# Patient Record
Sex: FEMALE | Race: WHITE | Employment: UNEMPLOYED | URBAN - METROPOLITAN AREA
[De-identification: names, ages, dates, MRNs, and addresses within clinical notes are randomized per-mention and may not be internally consistent; named-entity substitution may affect disease eponyms.]

---

## 2017-01-05 ENCOUNTER — OFFICE VISIT (OUTPATIENT)
Dept: INTERNAL MEDICINE CLINIC | Age: 27
End: 2017-01-05

## 2017-01-05 VITALS — OXYGEN SATURATION: 99 % | SYSTOLIC BLOOD PRESSURE: 128 MMHG | DIASTOLIC BLOOD PRESSURE: 78 MMHG | HEART RATE: 92 BPM

## 2017-01-05 DIAGNOSIS — R10.31 RIGHT LOWER QUADRANT ABDOMINAL PAIN: Primary | ICD-10-CM

## 2017-01-05 DIAGNOSIS — R00.2 PALPITATIONS: ICD-10-CM

## 2017-01-05 DIAGNOSIS — N92.6 MISSED MENSES: ICD-10-CM

## 2017-01-05 DIAGNOSIS — E55.9 VITAMIN D DEFICIENCY: ICD-10-CM

## 2017-01-05 DIAGNOSIS — E07.9 THYROID DISEASE: ICD-10-CM

## 2017-01-05 LAB — GONADOTROPIN, CHORIONIC (HCG) QUANT: <5 MIU/ML

## 2017-01-05 PROCEDURE — 99213 OFFICE O/P EST LOW 20 MIN: CPT | Performed by: NURSE PRACTITIONER

## 2017-01-05 RX ORDER — LEVOTHYROXINE SODIUM 0.12 MG/1
125 TABLET ORAL DAILY
Qty: 30 TABLET | Refills: 2 | Status: SHIPPED | OUTPATIENT
Start: 2017-01-05 | End: 2017-04-14 | Stop reason: SDUPTHER

## 2017-01-06 ENCOUNTER — TELEPHONE (OUTPATIENT)
Dept: INTERNAL MEDICINE CLINIC | Age: 27
End: 2017-01-06

## 2017-01-07 LAB — URINE CULTURE, ROUTINE: NORMAL

## 2017-01-10 ENCOUNTER — HOSPITAL ENCOUNTER (OUTPATIENT)
Dept: ULTRASOUND IMAGING | Age: 27
Discharge: HOME | End: 2017-01-10
Attending: NURSE PRACTITIONER | Admitting: NURSE PRACTITIONER

## 2017-01-10 DIAGNOSIS — R10.31 RIGHT LOWER QUADRANT PAIN: ICD-10-CM

## 2017-01-10 DIAGNOSIS — R10.31 RLQ ABDOMINAL PAIN: ICD-10-CM

## 2017-01-19 ENCOUNTER — OFFICE VISIT (OUTPATIENT)
Dept: INTERNAL MEDICINE CLINIC | Age: 27
End: 2017-01-19

## 2017-01-19 VITALS
SYSTOLIC BLOOD PRESSURE: 118 MMHG | OXYGEN SATURATION: 98 % | DIASTOLIC BLOOD PRESSURE: 76 MMHG | TEMPERATURE: 98.6 F | HEART RATE: 73 BPM

## 2017-01-19 DIAGNOSIS — J02.9 PHARYNGITIS, UNSPECIFIED ETIOLOGY: ICD-10-CM

## 2017-01-19 DIAGNOSIS — J00 ACUTE NASOPHARYNGITIS: Primary | ICD-10-CM

## 2017-01-19 LAB — S PYO AG THROAT QL: NORMAL

## 2017-01-19 PROCEDURE — 87880 STREP A ASSAY W/OPTIC: CPT | Performed by: NURSE PRACTITIONER

## 2017-01-19 PROCEDURE — 99213 OFFICE O/P EST LOW 20 MIN: CPT | Performed by: NURSE PRACTITIONER

## 2017-01-27 ENCOUNTER — OFFICE VISIT (OUTPATIENT)
Dept: PHYSICAL MEDICINE AND REHAB | Age: 27
End: 2017-01-27

## 2017-01-27 DIAGNOSIS — M79.602 PARESTHESIA AND PAIN OF BOTH UPPER EXTREMITIES: ICD-10-CM

## 2017-01-27 DIAGNOSIS — M79.601 PARESTHESIA AND PAIN OF BOTH UPPER EXTREMITIES: ICD-10-CM

## 2017-01-27 DIAGNOSIS — R20.2 PARESTHESIA AND PAIN OF BOTH UPPER EXTREMITIES: ICD-10-CM

## 2017-01-27 DIAGNOSIS — G56.03 BILATERAL CARPAL TUNNEL SYNDROME: Primary | ICD-10-CM

## 2017-01-27 DIAGNOSIS — R29.898 HAND WEAKNESS: ICD-10-CM

## 2017-01-27 PROCEDURE — 95911 NRV CNDJ TEST 9-10 STUDIES: CPT | Performed by: PHYSICAL MEDICINE & REHABILITATION

## 2017-01-27 PROCEDURE — 95886 MUSC TEST DONE W/N TEST COMP: CPT | Performed by: PHYSICAL MEDICINE & REHABILITATION

## 2017-02-01 ENCOUNTER — TELEPHONE (OUTPATIENT)
Dept: INTERNAL MEDICINE CLINIC | Age: 27
End: 2017-02-01

## 2017-03-13 ENCOUNTER — OFFICE VISIT (OUTPATIENT)
Dept: INTERNAL MEDICINE CLINIC | Age: 27
End: 2017-03-13

## 2017-03-13 VITALS — DIASTOLIC BLOOD PRESSURE: 76 MMHG | SYSTOLIC BLOOD PRESSURE: 108 MMHG | RESPIRATION RATE: 14 BRPM | HEART RATE: 72 BPM

## 2017-03-13 DIAGNOSIS — R30.0 DYSURIA: Primary | ICD-10-CM

## 2017-03-13 LAB
BILIRUBIN URINE: NEGATIVE
BILIRUBIN, POC: NORMAL
BLOOD URINE, POC: NORMAL
BLOOD, URINE: NEGATIVE
CLARITY, POC: NORMAL
CLARITY: ABNORMAL
COLOR, POC: YELLOW
COLOR: YELLOW
EPITHELIAL CELLS, UA: 7 /HPF (ref 0–5)
GLUCOSE URINE, POC: NORMAL
GLUCOSE URINE: NEGATIVE MG/DL
HYALINE CASTS: 2 /HPF (ref 0–8)
KETONES, POC: NORMAL
KETONES, URINE: NEGATIVE MG/DL
LEUKOCYTE EST, POC: NORMAL
LEUKOCYTE ESTERASE, URINE: NEGATIVE
MICROSCOPIC EXAMINATION: YES
NITRITE, POC: NORMAL
NITRITE, URINE: NEGATIVE
PH UA: 6
PH, POC: 6
PROTEIN UA: NEGATIVE MG/DL
PROTEIN, POC: NORMAL
RBC UA: 8 /HPF (ref 0–4)
SPECIFIC GRAVITY UA: 1.01
SPECIFIC GRAVITY, POC: 1.02
URINE TYPE: ABNORMAL
UROBILINOGEN, POC: 0.2
UROBILINOGEN, URINE: 0.2 E.U./DL
WBC UA: 1 /HPF (ref 0–5)

## 2017-03-13 PROCEDURE — 99213 OFFICE O/P EST LOW 20 MIN: CPT | Performed by: INTERNAL MEDICINE

## 2017-03-13 PROCEDURE — 81002 URINALYSIS NONAUTO W/O SCOPE: CPT | Performed by: INTERNAL MEDICINE

## 2017-03-15 LAB
ORGANISM: ABNORMAL
URINE CULTURE, ROUTINE: ABNORMAL

## 2017-03-15 RX ORDER — CIPROFLOXACIN 500 MG/1
500 TABLET, FILM COATED ORAL 2 TIMES DAILY
Qty: 14 TABLET | Refills: 0 | Status: SHIPPED | OUTPATIENT
Start: 2017-03-15 | End: 2017-03-22

## 2017-04-14 DIAGNOSIS — E07.9 THYROID DISEASE: ICD-10-CM

## 2017-04-17 RX ORDER — LEVOTHYROXINE SODIUM 0.12 MG/1
TABLET ORAL
Qty: 30 TABLET | Refills: 0 | Status: SHIPPED | OUTPATIENT
Start: 2017-04-17 | End: 2017-04-24 | Stop reason: SDUPTHER

## 2017-04-24 ENCOUNTER — OFFICE VISIT (OUTPATIENT)
Dept: INTERNAL MEDICINE CLINIC | Age: 27
End: 2017-04-24

## 2017-04-24 VITALS
OXYGEN SATURATION: 98 % | SYSTOLIC BLOOD PRESSURE: 102 MMHG | DIASTOLIC BLOOD PRESSURE: 72 MMHG | HEART RATE: 60 BPM | BODY MASS INDEX: 37.6 KG/M2 | WEIGHT: 205.6 LBS

## 2017-04-24 DIAGNOSIS — E07.9 THYROID DISEASE: Primary | ICD-10-CM

## 2017-04-24 DIAGNOSIS — J02.9 PHARYNGITIS, UNSPECIFIED ETIOLOGY: ICD-10-CM

## 2017-04-24 DIAGNOSIS — E07.9 THYROID DISEASE: ICD-10-CM

## 2017-04-24 LAB
S PYO AG THROAT QL: NORMAL
TSH SERPL DL<=0.05 MIU/L-ACNC: 2.11 UIU/ML (ref 0.27–4.2)

## 2017-04-24 PROCEDURE — 99213 OFFICE O/P EST LOW 20 MIN: CPT | Performed by: NURSE PRACTITIONER

## 2017-04-24 PROCEDURE — 87880 STREP A ASSAY W/OPTIC: CPT | Performed by: NURSE PRACTITIONER

## 2017-04-24 RX ORDER — MULTIVIT-MIN/IRON/FOLIC ACID/K 18-600-40
1 CAPSULE ORAL DAILY
COMMUNITY
End: 2017-07-26 | Stop reason: SDUPTHER

## 2017-04-24 RX ORDER — LEVOTHYROXINE SODIUM 0.12 MG/1
TABLET ORAL
Qty: 30 TABLET | Refills: 0 | Status: SHIPPED | OUTPATIENT
Start: 2017-04-24 | End: 2017-04-24 | Stop reason: CLARIF

## 2017-04-24 RX ORDER — LEVOTHYROXINE SODIUM 0.12 MG/1
TABLET ORAL
Qty: 30 TABLET | Refills: 5 | Status: SHIPPED | OUTPATIENT
Start: 2017-04-24 | End: 2017-10-16 | Stop reason: SDUPTHER

## 2017-04-28 DIAGNOSIS — K21.9 GASTROESOPHAGEAL REFLUX DISEASE WITHOUT ESOPHAGITIS: ICD-10-CM

## 2017-04-28 RX ORDER — RANITIDINE 300 MG/1
300 TABLET ORAL NIGHTLY
Qty: 30 TABLET | Refills: 3 | Status: SHIPPED | OUTPATIENT
Start: 2017-04-28 | End: 2017-09-20 | Stop reason: SDUPTHER

## 2017-05-08 ENCOUNTER — OFFICE VISIT (OUTPATIENT)
Dept: INTERNAL MEDICINE CLINIC | Age: 27
End: 2017-05-08

## 2017-05-08 VITALS
SYSTOLIC BLOOD PRESSURE: 120 MMHG | DIASTOLIC BLOOD PRESSURE: 78 MMHG | RESPIRATION RATE: 16 BRPM | OXYGEN SATURATION: 97 % | HEIGHT: 63 IN | HEART RATE: 55 BPM | BODY MASS INDEX: 36.86 KG/M2 | WEIGHT: 208 LBS

## 2017-05-08 DIAGNOSIS — R30.0 BURNING WITH URINATION: Primary | ICD-10-CM

## 2017-05-08 LAB
BILIRUBIN, POC: NORMAL
BLOOD URINE, POC: NORMAL
CLARITY, POC: CLEAR
COLOR, POC: CLEAR
GLUCOSE URINE, POC: NORMAL
KETONES, POC: NORMAL
LEUKOCYTE EST, POC: NORMAL
NITRITE, POC: NORMAL
PH, POC: 6
PROTEIN, POC: NORMAL
REASON FOR REJECTION: NORMAL
REJECTED TEST: NORMAL
SPECIFIC GRAVITY, POC: 1.01
UROBILINOGEN, POC: NORMAL

## 2017-05-08 PROCEDURE — 81002 URINALYSIS NONAUTO W/O SCOPE: CPT | Performed by: NURSE PRACTITIONER

## 2017-05-08 PROCEDURE — 99213 OFFICE O/P EST LOW 20 MIN: CPT | Performed by: NURSE PRACTITIONER

## 2017-05-08 RX ORDER — CIPROFLOXACIN 250 MG/1
250 TABLET, FILM COATED ORAL 2 TIMES DAILY
Qty: 20 TABLET | Refills: 0 | Status: SHIPPED | OUTPATIENT
Start: 2017-05-08 | End: 2017-05-18

## 2017-05-16 ENCOUNTER — OFFICE VISIT (OUTPATIENT)
Dept: INTERNAL MEDICINE CLINIC | Age: 27
End: 2017-05-16

## 2017-05-16 VITALS — DIASTOLIC BLOOD PRESSURE: 68 MMHG | OXYGEN SATURATION: 98 % | HEART RATE: 101 BPM | SYSTOLIC BLOOD PRESSURE: 100 MMHG

## 2017-05-16 DIAGNOSIS — S39.011A ABDOMINAL MUSCLE STRAIN, INITIAL ENCOUNTER: Primary | ICD-10-CM

## 2017-05-16 PROCEDURE — 99213 OFFICE O/P EST LOW 20 MIN: CPT | Performed by: NURSE PRACTITIONER

## 2017-05-16 RX ORDER — CYCLOBENZAPRINE HCL 5 MG
5 TABLET ORAL 3 TIMES DAILY PRN
Qty: 30 TABLET | Refills: 0 | Status: SHIPPED | OUTPATIENT
Start: 2017-05-16 | End: 2017-10-16 | Stop reason: ALTCHOICE

## 2017-05-16 RX ORDER — IBUPROFEN 800 MG/1
800 TABLET ORAL EVERY 6 HOURS PRN
COMMUNITY
End: 2017-10-26 | Stop reason: ALTCHOICE

## 2017-05-16 RX ORDER — TRAMADOL HYDROCHLORIDE 50 MG/1
50 TABLET ORAL EVERY 6 HOURS PRN
Qty: 20 TABLET | Refills: 0 | Status: SHIPPED | OUTPATIENT
Start: 2017-05-16 | End: 2017-09-29 | Stop reason: ALTCHOICE

## 2017-06-27 ENCOUNTER — OFFICE VISIT (OUTPATIENT)
Dept: INTERNAL MEDICINE CLINIC | Age: 27
End: 2017-06-27

## 2017-06-27 VITALS
OXYGEN SATURATION: 98 % | SYSTOLIC BLOOD PRESSURE: 104 MMHG | HEART RATE: 64 BPM | WEIGHT: 202.8 LBS | DIASTOLIC BLOOD PRESSURE: 80 MMHG | BODY MASS INDEX: 36.21 KG/M2

## 2017-06-27 DIAGNOSIS — M79.18 INTERCOSTAL MUSCLE PAIN: Primary | ICD-10-CM

## 2017-06-27 PROCEDURE — 99213 OFFICE O/P EST LOW 20 MIN: CPT | Performed by: NURSE PRACTITIONER

## 2017-06-27 RX ORDER — PREDNISONE 10 MG/1
TABLET ORAL
Qty: 20 TABLET | Refills: 0 | Status: SHIPPED | OUTPATIENT
Start: 2017-06-27 | End: 2017-09-29 | Stop reason: ALTCHOICE

## 2017-07-03 ENCOUNTER — OFFICE VISIT (OUTPATIENT)
Dept: INTERNAL MEDICINE CLINIC | Age: 27
End: 2017-07-03

## 2017-07-03 ENCOUNTER — HOSPITAL ENCOUNTER (OUTPATIENT)
Dept: GENERAL RADIOLOGY | Age: 27
Discharge: HOME | End: 2017-07-03
Attending: NURSE PRACTITIONER | Admitting: NURSE PRACTITIONER

## 2017-07-03 VITALS
OXYGEN SATURATION: 98 % | SYSTOLIC BLOOD PRESSURE: 110 MMHG | DIASTOLIC BLOOD PRESSURE: 70 MMHG | BODY MASS INDEX: 36.6 KG/M2 | WEIGHT: 205 LBS | RESPIRATION RATE: 16 BRPM | HEART RATE: 101 BPM

## 2017-07-03 DIAGNOSIS — M79.10 MYALGIA: ICD-10-CM

## 2017-07-03 DIAGNOSIS — M79.18 INTERCOSTAL MUSCLE PAIN: Primary | ICD-10-CM

## 2017-07-03 PROCEDURE — 99213 OFFICE O/P EST LOW 20 MIN: CPT | Performed by: NURSE PRACTITIONER

## 2017-07-03 RX ORDER — CYCLOBENZAPRINE HCL 5 MG
5 TABLET ORAL 3 TIMES DAILY PRN
Qty: 30 TABLET | Refills: 0 | Status: CANCELLED | OUTPATIENT
Start: 2017-07-03

## 2017-07-06 ENCOUNTER — TELEPHONE (OUTPATIENT)
Dept: INTERNAL MEDICINE CLINIC | Age: 27
End: 2017-07-06

## 2017-07-06 DIAGNOSIS — M25.511 ACUTE PAIN OF RIGHT SHOULDER: Primary | ICD-10-CM

## 2017-07-07 DIAGNOSIS — M25.512 ACUTE PAIN OF LEFT SHOULDER: Primary | ICD-10-CM

## 2017-07-17 ENCOUNTER — OFFICE VISIT (OUTPATIENT)
Dept: INTERNAL MEDICINE CLINIC | Age: 27
End: 2017-07-17

## 2017-07-17 VITALS — DIASTOLIC BLOOD PRESSURE: 74 MMHG | OXYGEN SATURATION: 98 % | HEART RATE: 78 BPM | SYSTOLIC BLOOD PRESSURE: 112 MMHG

## 2017-07-17 DIAGNOSIS — R30.0 DYSURIA: Primary | ICD-10-CM

## 2017-07-17 DIAGNOSIS — N89.8 VAGINAL DISCHARGE: ICD-10-CM

## 2017-07-17 LAB
BILIRUBIN, POC: NORMAL
BLOOD URINE, POC: NEGATIVE
CLARITY, POC: NORMAL
COLOR, POC: NORMAL
GLUCOSE URINE, POC: NEGATIVE
KETONES, POC: NEGATIVE
LEUKOCYTE EST, POC: NEGATIVE
NITRITE, POC: NEGATIVE
PH, POC: 6
PROTEIN, POC: NEGATIVE
SPECIFIC GRAVITY, POC: 1.02
UROBILINOGEN, POC: 2

## 2017-07-17 PROCEDURE — 81002 URINALYSIS NONAUTO W/O SCOPE: CPT | Performed by: NURSE PRACTITIONER

## 2017-07-17 PROCEDURE — 99213 OFFICE O/P EST LOW 20 MIN: CPT | Performed by: NURSE PRACTITIONER

## 2017-07-18 ENCOUNTER — HOSPITAL ENCOUNTER (OUTPATIENT)
Dept: PHYSICAL THERAPY | Age: 27
Discharge: HOME | End: 2017-07-31
Attending: NURSE PRACTITIONER | Admitting: NURSE PRACTITIONER

## 2017-07-18 LAB
CANDIDA SPECIES, DNA PROBE: NORMAL
GARDNERELLA VAGINALIS, DNA PROBE: NORMAL
TRICHOMONAS VAGINALIS DNA: NORMAL

## 2017-07-18 ASSESSMENT — PAIN DESCRIPTION - FREQUENCY: FREQUENCY: CONTINUOUS

## 2017-07-18 ASSESSMENT — PAIN SCALES - GENERAL: PAINLEVEL_OUTOF10: 7

## 2017-07-18 ASSESSMENT — PAIN DESCRIPTION - DESCRIPTORS: DESCRIPTORS: SHARP;ACHING

## 2017-07-19 LAB — URINE CULTURE, ROUTINE: NORMAL

## 2017-07-26 DIAGNOSIS — E55.9 VITAMIN D DEFICIENCY: Primary | ICD-10-CM

## 2017-07-26 RX ORDER — MULTIVIT-MIN/IRON/FOLIC ACID/K 18-600-40
1 CAPSULE ORAL DAILY
Qty: 30 CAPSULE | Refills: 5 | Status: SHIPPED | OUTPATIENT
Start: 2017-07-26 | End: 2018-01-12

## 2017-08-01 ENCOUNTER — HOSPITAL ENCOUNTER (OUTPATIENT)
Dept: OTHER | Age: 27
Discharge: HOME | End: 2017-08-18
Attending: NURSE PRACTITIONER | Admitting: NURSE PRACTITIONER

## 2017-09-20 DIAGNOSIS — K21.9 GASTROESOPHAGEAL REFLUX DISEASE WITHOUT ESOPHAGITIS: ICD-10-CM

## 2017-09-20 RX ORDER — RANITIDINE 300 MG/1
TABLET ORAL
Qty: 30 TABLET | Refills: 3 | Status: SHIPPED | OUTPATIENT
Start: 2017-09-20 | End: 2017-10-16 | Stop reason: SDUPTHER

## 2017-09-29 ENCOUNTER — OFFICE VISIT (OUTPATIENT)
Dept: INTERNAL MEDICINE CLINIC | Age: 27
End: 2017-09-29

## 2017-09-29 VITALS
HEART RATE: 55 BPM | TEMPERATURE: 98.6 F | DIASTOLIC BLOOD PRESSURE: 82 MMHG | OXYGEN SATURATION: 97 % | SYSTOLIC BLOOD PRESSURE: 122 MMHG

## 2017-09-29 DIAGNOSIS — R42 VERTIGO: Primary | ICD-10-CM

## 2017-09-29 PROCEDURE — 99214 OFFICE O/P EST MOD 30 MIN: CPT | Performed by: NURSE PRACTITIONER

## 2017-09-29 RX ORDER — ACETAMINOPHEN, ASPIRIN AND CAFFEINE 250; 250; 65 MG/1; MG/1; MG/1
1 TABLET, FILM COATED ORAL EVERY 6 HOURS PRN
COMMUNITY
End: 2017-10-26 | Stop reason: ALTCHOICE

## 2017-10-16 ENCOUNTER — OFFICE VISIT (OUTPATIENT)
Dept: INTERNAL MEDICINE CLINIC | Age: 27
End: 2017-10-16

## 2017-10-16 VITALS
SYSTOLIC BLOOD PRESSURE: 106 MMHG | WEIGHT: 203.2 LBS | HEART RATE: 62 BPM | DIASTOLIC BLOOD PRESSURE: 74 MMHG | OXYGEN SATURATION: 98 % | BODY MASS INDEX: 37.17 KG/M2

## 2017-10-16 DIAGNOSIS — E07.9 THYROID DISEASE: Primary | ICD-10-CM

## 2017-10-16 DIAGNOSIS — J30.9 ACUTE ALLERGIC RHINITIS, UNSPECIFIED SEASONALITY, UNSPECIFIED TRIGGER: ICD-10-CM

## 2017-10-16 DIAGNOSIS — K21.9 GASTROESOPHAGEAL REFLUX DISEASE WITHOUT ESOPHAGITIS: ICD-10-CM

## 2017-10-16 PROCEDURE — 99213 OFFICE O/P EST LOW 20 MIN: CPT | Performed by: NURSE PRACTITIONER

## 2017-10-16 RX ORDER — RANITIDINE 300 MG/1
TABLET ORAL
Qty: 30 TABLET | Refills: 3 | Status: SHIPPED | OUTPATIENT
Start: 2017-10-16 | End: 2018-01-12 | Stop reason: SDUPTHER

## 2017-10-16 RX ORDER — FLUTICASONE PROPIONATE 50 MCG
1 SPRAY, SUSPENSION (ML) NASAL DAILY
Qty: 1 BOTTLE | Refills: 3 | Status: SHIPPED | OUTPATIENT
Start: 2017-10-16 | End: 2018-01-12 | Stop reason: SDUPTHER

## 2017-10-16 RX ORDER — LEVOTHYROXINE SODIUM 0.12 MG/1
TABLET ORAL
Qty: 30 TABLET | Refills: 5 | Status: SHIPPED | OUTPATIENT
Start: 2017-10-16 | End: 2018-01-12 | Stop reason: SDUPTHER

## 2017-10-25 ENCOUNTER — HOSPITAL ENCOUNTER (OUTPATIENT)
Dept: GENERAL RADIOLOGY | Age: 27
Discharge: HOME | End: 2017-10-25
Attending: NURSE PRACTITIONER | Admitting: NURSE PRACTITIONER

## 2017-10-25 LAB — TSH HIGH SENSITIVITY: 0.91 UIU/ML (ref 0.27–4.2)

## 2017-10-26 ENCOUNTER — OFFICE VISIT (OUTPATIENT)
Dept: INTERNAL MEDICINE CLINIC | Age: 27
End: 2017-10-26

## 2017-10-26 VITALS
SYSTOLIC BLOOD PRESSURE: 106 MMHG | DIASTOLIC BLOOD PRESSURE: 74 MMHG | OXYGEN SATURATION: 99 % | TEMPERATURE: 98.4 F | HEART RATE: 64 BPM

## 2017-10-26 DIAGNOSIS — R35.0 URINARY FREQUENCY: Primary | ICD-10-CM

## 2017-10-26 DIAGNOSIS — G44.229 CHRONIC TENSION-TYPE HEADACHE, NOT INTRACTABLE: ICD-10-CM

## 2017-10-26 LAB
BILIRUBIN, POC: NEGATIVE
BLOOD URINE, POC: NEGATIVE
CLARITY, POC: ABNORMAL
COLOR, POC: ABNORMAL
GLUCOSE URINE, POC: NEGATIVE
KETONES, POC: NEGATIVE
LEUKOCYTE EST, POC: ABNORMAL
NITRITE, POC: NEGATIVE
PH, POC: 5.5
PROTEIN, POC: NEGATIVE
SPECIFIC GRAVITY, POC: 1.01
UROBILINOGEN, POC: 0.2

## 2017-10-26 PROCEDURE — G8417 CALC BMI ABV UP PARAM F/U: HCPCS | Performed by: NURSE PRACTITIONER

## 2017-10-26 PROCEDURE — G8484 FLU IMMUNIZE NO ADMIN: HCPCS | Performed by: NURSE PRACTITIONER

## 2017-10-26 PROCEDURE — 99213 OFFICE O/P EST LOW 20 MIN: CPT | Performed by: NURSE PRACTITIONER

## 2017-10-26 PROCEDURE — G8427 DOCREV CUR MEDS BY ELIG CLIN: HCPCS | Performed by: NURSE PRACTITIONER

## 2017-10-26 PROCEDURE — 81002 URINALYSIS NONAUTO W/O SCOPE: CPT | Performed by: NURSE PRACTITIONER

## 2017-10-26 PROCEDURE — 1036F TOBACCO NON-USER: CPT | Performed by: NURSE PRACTITIONER

## 2017-10-26 RX ORDER — NITROFURANTOIN 25; 75 MG/1; MG/1
100 CAPSULE ORAL 2 TIMES DAILY
Qty: 14 CAPSULE | Refills: 0 | Status: SHIPPED | OUTPATIENT
Start: 2017-10-26 | End: 2017-11-02

## 2017-10-26 RX ORDER — AMITRIPTYLINE HYDROCHLORIDE 10 MG/1
10 TABLET, FILM COATED ORAL NIGHTLY
Qty: 30 TABLET | Refills: 3 | Status: SHIPPED | OUTPATIENT
Start: 2017-10-26 | End: 2017-12-01 | Stop reason: ALTCHOICE

## 2017-10-26 RX ORDER — ACETAMINOPHEN, ASPIRIN AND CAFFEINE 250; 250; 65 MG/1; MG/1; MG/1
1 TABLET, FILM COATED ORAL EVERY 6 HOURS PRN
Qty: 90 TABLET | Refills: 3 | Status: SHIPPED | OUTPATIENT
Start: 2017-10-26 | End: 2017-10-26 | Stop reason: ALTCHOICE

## 2017-10-28 LAB
ORGANISM: ABNORMAL
URINE CULTURE, ROUTINE: ABNORMAL
URINE CULTURE, ROUTINE: ABNORMAL

## 2017-12-01 ENCOUNTER — OFFICE VISIT (OUTPATIENT)
Dept: CARDIOLOGY CLINIC | Age: 27
End: 2017-12-01

## 2017-12-01 ENCOUNTER — NURSE ONLY (OUTPATIENT)
Dept: CARDIOLOGY CLINIC | Age: 27
End: 2017-12-01

## 2017-12-01 VITALS
WEIGHT: 203.2 LBS | HEIGHT: 62 IN | BODY MASS INDEX: 37.39 KG/M2 | SYSTOLIC BLOOD PRESSURE: 114 MMHG | DIASTOLIC BLOOD PRESSURE: 86 MMHG | HEART RATE: 62 BPM

## 2017-12-01 DIAGNOSIS — N60.19 FIBROCYSTIC BREAST DISEASE (FCBD), UNSPECIFIED LATERALITY: ICD-10-CM

## 2017-12-01 DIAGNOSIS — R00.2 PALPITATIONS: Primary | ICD-10-CM

## 2017-12-01 DIAGNOSIS — I49.8 SINUS ARRHYTHMIA: ICD-10-CM

## 2017-12-01 DIAGNOSIS — R00.2 PALPITATIONS: ICD-10-CM

## 2017-12-01 DIAGNOSIS — E07.9 THYROID DISEASE: ICD-10-CM

## 2017-12-01 DIAGNOSIS — R07.9 CHEST PAIN, UNSPECIFIED TYPE: Primary | ICD-10-CM

## 2017-12-01 PROCEDURE — G8417 CALC BMI ABV UP PARAM F/U: HCPCS | Performed by: INTERNAL MEDICINE

## 2017-12-01 PROCEDURE — 99212 OFFICE O/P EST SF 10 MIN: CPT | Performed by: INTERNAL MEDICINE

## 2017-12-01 PROCEDURE — 1036F TOBACCO NON-USER: CPT | Performed by: INTERNAL MEDICINE

## 2017-12-01 PROCEDURE — G8427 DOCREV CUR MEDS BY ELIG CLIN: HCPCS | Performed by: INTERNAL MEDICINE

## 2017-12-01 PROCEDURE — G8484 FLU IMMUNIZE NO ADMIN: HCPCS | Performed by: INTERNAL MEDICINE

## 2017-12-01 RX ORDER — ACETAMINOPHEN, ASPIRIN AND CAFFEINE 250; 250; 65 MG/1; MG/1; MG/1
1 TABLET, FILM COATED ORAL EVERY 6 HOURS PRN
COMMUNITY
End: 2022-03-30 | Stop reason: CLARIF

## 2017-12-01 NOTE — PROGRESS NOTES
OFFICE PROGRESS NOTES      Karina Wei is a 28 y.o. female who has    CHIEF COMPLAINT AS FOLLOWS:  CHEST PAIN: Patient C/O chest pain but symptoms appear to be atypical.Chest pain is worse with deep inspiration. SOB:  Has SOB with exertion but no change over previous noted. LEG EDEMA: No leg edema   PALPITATIONS:   C/O episodes of palpitations, some times lasting over an hour. DIZZINESS:   C/O  Light headedness with palpitations. SYNCOPE: None   OTHER:                                     HPI: Patient is here for F/U on his Chest pain & palpitation problems. Patient was recently in ED for the same. Jose Orona has the following history recorded in care path:  Patient Active Problem List    Diagnosis Date Noted    Vitamin D deficiency 12/31/2016    Interstitial cystitis 05/19/2016    Thyroid disease     Sinus arrhythmia     Chest pain     Palpitations     Fibrocystic breast disease 08/20/2014     Current Outpatient Prescriptions   Medication Sig Dispense Refill    aspirin-acetaminophen-caffeine (EXCEDRIN MIGRAINE) 250-250-65 MG per tablet Take 1 tablet by mouth every 6 hours as needed for Headaches      levothyroxine (SYNTHROID) 125 MCG tablet TAKE ONE TABLET BY MOUTH ONCE DAILY 30 tablet 5    ranitidine (ZANTAC) 300 MG tablet TAKE ONE TABLET BY MOUTH NIGHTLY 30 tablet 3    fluticasone (FLONASE) 50 MCG/ACT nasal spray 1 spray by Nasal route daily 1 Bottle 3    Cholecalciferol (VITAMIN D) 2000 units CAPS capsule Take 1 capsule by mouth daily 30 capsule 5     No current facility-administered medications for this visit. Allergies: Amoxicillin; Pcn [penicillins]; Sulfa antibiotics; and Benadryl [diphenhydramine hcl]  Past Medical History:   Diagnosis Date    Chest pain     H/O 24 hour EKG monitoring 11/3/09    48 hr holter monitor.  Significant for sinus tachycardia episode the fastest of which is at 171 beats per min lasting for 3 min Negative for headaches, nasal congestion, sinus pain or vertigo  Eyes: Negative for visual disturbance. Endocrine: Negative for polydipsia/polyuria  Respiratory: Negative for shortness of breath  Cardiovascular: Negative for chest pain, dyspnea on exertion, claudication, edema, irregular heartbeat, murmur, palpitations or shortness of breath  Gastrointestinal: Negative for abdominal pain or heartburn  Genito-Urinary: Negative for urinary frequency/urgency  Musculoskeletal: Negative for muscle pain, muscular weakness, negative for pain in arm and leg or swelling in foot and leg  Neurological: Negative for dizziness, headaches, memory loss, numbness/tingling, visual changes, syncope  Dermatological: Negative for rash    Objective:  /86   Pulse 62   Ht 5' 2\" (1.575 m)   Wt 203 lb 3.2 oz (92.2 kg)   LMP 11/13/2017   BMI 37.17 kg/m²   Wt Readings from Last 3 Encounters:   12/01/17 203 lb 3.2 oz (92.2 kg)   11/26/17 205 lb (93 kg)   10/16/17 203 lb 3.2 oz (92.2 kg)     Body mass index is 37.17 kg/m². GENERAL - Alert, oriented, pleasant, in no apparent distress. EYES: No jaundice, no conjunctival pallor. SKIN: It is warm & dry. No rashes. No Echhymosis    HEENT  No clinically significant abnormalities seen. Neck - Supple. No jugular venous distention noted. No carotid bruits. Cardiovascular  Normal S1 and S2 without obvious murmur or gallop. Extremities - No cyanosis, clubbing, or significant edema. Pulmonary  No respiratory distress. No wheezes or rales. Abdomen  No masses, tenderness, or organomegaly. Musculoskeletal  No significant edema. No joint deformities. No muscle wasting. Neurologic  Cranial nerves II through XII are grossly intact. There were no gross focal neurologic abnormalities.     Lab Review   No results found for: CKTOTAL, CKMB, CKMBINDEX, TROPONINI  BNP:  No results found for: BNP  PT/INR:  No results found for: INR  No results found for: LABA1C  Lab Results Component Value Date    WBC 4.5 11/26/2017    HCT 39.6 11/26/2017    MCV 91.0 11/26/2017     11/26/2017     Lab Results   Component Value Date    CHOL 161 04/23/2012    TRIG 50 04/23/2012    HDL 59 (L) 04/23/2012    LDLDIRECT 83 04/23/2012     Lab Results   Component Value Date    ALT 20 11/26/2017    AST 17 11/26/2017     BMP:    Lab Results   Component Value Date     11/26/2017    K 4.3 11/26/2017     11/26/2017    CO2 23 11/26/2017    BUN 12 11/26/2017    CREATININE 0.7 11/26/2017     CMP:   Lab Results   Component Value Date     11/26/2017    K 4.3 11/26/2017     11/26/2017    CO2 23 11/26/2017    BUN 12 11/26/2017    PROT 7.1 11/26/2017    PROT 7.1 01/10/2013     TSH:    Lab Results   Component Value Date    TSH 2.11 04/24/2017       QUALITY MEASURES REVIEWED:  1.CAD:Patient is taking anti platelet agent:No  2. DYSLIPIDEMIA: Patient is on cholesterol lowering medication:No  3. Beta-Blocker therapy for CAD, if prior Myocardial Infarction:Yes and No  4. Atrial fibrillation & anticoagulation therapy No  5. Discussed weight management strategies. Impression:    1. Chest pain, unspecified type    2. Fibrocystic breast disease (FCBD), unspecified laterality    3. Thyroid disease    4. Sinus arrhythmia    5. Palpitations       Patient Active Problem List   Diagnosis Code    Fibrocystic breast disease N60.19    Thyroid disease E07.9    Sinus arrhythmia I49.9    Chest pain R07.9    Palpitations R00.2    Interstitial cystitis N30.10    Vitamin D deficiency E55.9       Assessment & Plan:    Chest pain, palpitations, SOB & Dizzyness    TESTS ORDERED: Echocardiogram, ETT & Event monitor                                      All previously ordered tests reviewed. ARRHYTHMIAS: ? SVT   MEDICATIONS: CPM   Office f/u in ONE month. If no diagnosis may need a Loop recorder.   Primary/secondary prevention is the goal by aggressive risk modification, healthy and therapeutic life style changes for cardiovascular risk reduction. Various goals are discussed and multiple questions answered.

## 2017-12-01 NOTE — PATIENT INSTRUCTIONS
Please remember to bring all medication bottles or a medication list with you to your appointment. If you have any questions, please call our office at [phone]. Chest pain, palpitations, SOB & Dizzyness    TESTS ORDERED: Echocardiogram, ETT & Event monitor                                      All previously ordered tests reviewed. ARRHYTHMIAS: ? SVT   MEDICATIONS: CPM   Office f/u in ONE month. If no diagnosis may need a Loop recorder. Primary/secondary prevention is the goal by aggressive risk modification, healthy and therapeutic life style changes for cardiovascular risk reduction. Various goals are discussed and multiple questions answered.

## 2017-12-01 NOTE — LETTER
Cardiology [address] [address]  Phone: [phone]  Fax: [phone]    Lovella Bloch, MD        December 1, 2017     Amy Dixon NP  [id number]    Patient: Raffi Gomez  MR Number: [id number]  YOB: 1990  Date of Visit: 12/1/2017    Dear Dr. Amy Dixon:    Thank you for the request for consultation for Lila Harrell to me for the evaluation of Palpitations. Below are the relevant portions of my assessment and plan of care. If you have questions, please do not hesitate to call me. I look forward to following Geovanna Garcia along with you.     Sincerely,        Lovella Bloch, MD

## 2017-12-01 NOTE — PROGRESS NOTES
30 days e-cardio monitor placed.  # [id number]. Instructed patient on how to record the event and to call monitoring center at [phone] if any problems arise. Instructed patient to disconnect the lead wires from the electrodes before bathing or showering and reattach them afterwards. Instructed patient that the electrodes should be changed every 3 days or if they no longer adhere to the skin. Patient to mail package after the monitor has ended. Patient verbalized understanding.

## 2017-12-15 ENCOUNTER — OFFICE VISIT (OUTPATIENT)
Dept: INTERNAL MEDICINE CLINIC | Age: 27
End: 2017-12-15

## 2017-12-15 VITALS
OXYGEN SATURATION: 97 % | SYSTOLIC BLOOD PRESSURE: 102 MMHG | HEART RATE: 63 BPM | DIASTOLIC BLOOD PRESSURE: 72 MMHG | TEMPERATURE: 97.9 F

## 2017-12-15 DIAGNOSIS — R42 DIZZINESSES: Primary | ICD-10-CM

## 2017-12-15 DIAGNOSIS — R00.2 PALPITATIONS: ICD-10-CM

## 2017-12-15 PROCEDURE — 1036F TOBACCO NON-USER: CPT | Performed by: NURSE PRACTITIONER

## 2017-12-15 PROCEDURE — G8417 CALC BMI ABV UP PARAM F/U: HCPCS | Performed by: NURSE PRACTITIONER

## 2017-12-15 PROCEDURE — G8484 FLU IMMUNIZE NO ADMIN: HCPCS | Performed by: NURSE PRACTITIONER

## 2017-12-15 PROCEDURE — 99213 OFFICE O/P EST LOW 20 MIN: CPT | Performed by: NURSE PRACTITIONER

## 2017-12-15 PROCEDURE — G8427 DOCREV CUR MEDS BY ELIG CLIN: HCPCS | Performed by: NURSE PRACTITIONER

## 2017-12-15 NOTE — PROGRESS NOTES
Daksha Bookbinder  1990 12/16/17    SUBJECTIVE:      Chief Complaint   Patient presents with    Other     dizzy, nausea, WAHL began last night     HPI:  Patient presents with complaints of palpitations, dizziness, nausea, HA, otalgia. Denies fevers, vomiting, diarrhea, shortness of breath, wheezing. Onset of symptoms was yesterday but has been happening for several months. She is being worked up by cardio for same. Dr. Yomaira Garcia put her on a 30 day monitor and is scheduled for ECHO and stress on Tuesday. Follow up with cardio on January 4th. Review of Systems    OBJECTIVE:      /72 (Site: Left Arm, Position: Sitting, Cuff Size: Large Adult)   Pulse 63   Temp 97.9 °F (36.6 °C)   LMP 12/11/2017 (Exact Date)   SpO2 97%     Physical Exam   Constitutional: She is oriented to person, place, and time. She appears well-developed and well-nourished. Eyes: Conjunctivae are normal. No scleral icterus. Cardiovascular: Normal rate, regular rhythm and normal heart sounds. No murmur heard. Pulmonary/Chest: Effort normal and breath sounds normal. No respiratory distress. She has no wheezes. Neurological: She is alert and oriented to person, place, and time. No cranial nerve deficit. Skin: Skin is warm. Psychiatric: She has a normal mood and affect. ASSESSMENT:    1. Dizzinesses    2. Palpitations      PLAN:    Mirtha Arenas was seen today for other. Mirtha Arenas is currently getting worked up by cardio and will have her follow up after workup complete. If negative will look into possible BPV as cause of symptoms. Follow up in 4 weeks or sooner if needed.     Diagnoses and all orders for this visit:    Dizzinesses    Palpitations

## 2017-12-15 NOTE — PATIENT INSTRUCTIONS
Patient Education        Epley Maneuver at Home for Vertigo: Exercises  Your Care Instructions  Vertigo is a spinning or whirling sensation when you move your head. Your doctor may have moved you in different positions to help your vertigo get better faster. This is called the Epley maneuver. Your doctor also may have asked you to do these exercises at home. Do the exercises as often as your doctor recommends. If your vertigo is getting worse, your doctor may have you change the exercise or stop it. How to do the exercises  Step 1    1. Sit on the edge of a bed or sofa. Step 2    1. Turn your head 45 degrees in the direction your doctor told you to. This may be toward the ear that causes the most vertigo for you. Step 3    1. Tilt yourself backward until you are lying on your back. Your head should still be at a 45-degree turn. Your head should be about midway between looking straight ahead and looking out to your side. Hold for 30 seconds. If you have vertigo, stay in this position until it stops. Step 4    1. Turn your head 90 degrees toward the ear that has the least vertigo. The point of your chin should be over your shoulder. Hold for 30 seconds. Step 5    1. Roll onto the side of the ear with the least vertigo. You should now be looking at the floor. Follow-up care is a key part of your treatment and safety. Be sure to make and go to all appointments, and call your doctor if you are having problems. It's also a good idea to know your test results and keep a list of the medicines you take. Where can you learn more? Go to https://[email or web address].[email or web address] org and sign in to your [employer] account. Enter P575 in the [employer] box to learn more about \"Epley Maneuver at Home for Vertigo: Exercises. \"     If you do not have an account, please click on the \"Sign Up Now\" link. Current as of: October 14, 2016  Content Version: 11.4  © 7267-9385 [employer]wise, Incorporated.  Care instructions

## 2017-12-19 ENCOUNTER — PROCEDURE VISIT (OUTPATIENT)
Dept: CARDIOLOGY CLINIC | Age: 27
End: 2017-12-19

## 2017-12-19 VITALS
WEIGHT: 203 LBS | BODY MASS INDEX: 37.36 KG/M2 | DIASTOLIC BLOOD PRESSURE: 82 MMHG | HEIGHT: 62 IN | HEART RATE: 61 BPM | SYSTOLIC BLOOD PRESSURE: 142 MMHG

## 2017-12-19 DIAGNOSIS — N60.19 FIBROCYSTIC BREAST DISEASE (FCBD), UNSPECIFIED LATERALITY: ICD-10-CM

## 2017-12-19 DIAGNOSIS — E07.9 THYROID DISEASE: ICD-10-CM

## 2017-12-19 DIAGNOSIS — R00.2 PALPITATIONS: ICD-10-CM

## 2017-12-19 DIAGNOSIS — I49.8 SINUS ARRHYTHMIA: ICD-10-CM

## 2017-12-19 DIAGNOSIS — R07.9 CHEST PAIN, UNSPECIFIED TYPE: ICD-10-CM

## 2017-12-19 DIAGNOSIS — R07.9 CHEST PAIN, UNSPECIFIED TYPE: Primary | ICD-10-CM

## 2017-12-19 LAB
LV EF: 60 %
LVEF MODALITY: NORMAL

## 2017-12-19 PROCEDURE — 93306 TTE W/DOPPLER COMPLETE: CPT | Performed by: INTERNAL MEDICINE

## 2017-12-19 PROCEDURE — 93015 CV STRESS TEST SUPVJ I&R: CPT | Performed by: INTERNAL MEDICINE

## 2017-12-20 ENCOUNTER — TELEPHONE (OUTPATIENT)
Dept: CARDIOLOGY CLINIC | Age: 27
End: 2017-12-20

## 2018-01-05 PROCEDURE — 93228 REMOTE 30 DAY ECG REV/REPORT: CPT | Performed by: INTERNAL MEDICINE

## 2018-01-11 ENCOUNTER — OFFICE VISIT (OUTPATIENT)
Dept: CARDIOLOGY CLINIC | Age: 28
End: 2018-01-11

## 2018-01-11 VITALS
HEIGHT: 62 IN | DIASTOLIC BLOOD PRESSURE: 78 MMHG | HEART RATE: 72 BPM | WEIGHT: 208 LBS | BODY MASS INDEX: 38.28 KG/M2 | SYSTOLIC BLOOD PRESSURE: 128 MMHG

## 2018-01-11 DIAGNOSIS — N60.19 FIBROCYSTIC BREAST DISEASE (FCBD), UNSPECIFIED LATERALITY: ICD-10-CM

## 2018-01-11 DIAGNOSIS — E07.9 THYROID DISEASE: ICD-10-CM

## 2018-01-11 DIAGNOSIS — E55.9 VITAMIN D DEFICIENCY: ICD-10-CM

## 2018-01-11 DIAGNOSIS — I49.8 SINUS ARRHYTHMIA: ICD-10-CM

## 2018-01-11 DIAGNOSIS — R00.2 PALPITATIONS: Primary | ICD-10-CM

## 2018-01-11 PROCEDURE — 1036F TOBACCO NON-USER: CPT | Performed by: INTERNAL MEDICINE

## 2018-01-11 PROCEDURE — G8484 FLU IMMUNIZE NO ADMIN: HCPCS | Performed by: INTERNAL MEDICINE

## 2018-01-11 PROCEDURE — 99212 OFFICE O/P EST SF 10 MIN: CPT | Performed by: INTERNAL MEDICINE

## 2018-01-11 PROCEDURE — G8417 CALC BMI ABV UP PARAM F/U: HCPCS | Performed by: INTERNAL MEDICINE

## 2018-01-11 PROCEDURE — G8427 DOCREV CUR MEDS BY ELIG CLIN: HCPCS | Performed by: INTERNAL MEDICINE

## 2018-01-11 NOTE — PROGRESS NOTES
OFFICE PROGRESS NOTES      [place] is a 28 y.o. female who has    CHIEF COMPLAINT AS FOLLOWS:  CHEST PAIN: Patient denies any C/O chest pains at this time. SOB: No C/O SOB at this time. LEG EDEMA: No leg edema   PALPITATIONS: Denies any C/O Palpitations                                 DIZZINESS: No C/O Dizziness                         SYNCOPE: None   OTHER:                                     HPI: Patient is here for F/U on his CAD, HTN & Dyslipidemia problems. He does not have any complaints at this time. Adele Santacruz has the following history recorded in care path:  Patient Active Problem List    Diagnosis Date Noted    Vitamin D deficiency 12/31/2016    Interstitial cystitis 05/19/2016    Thyroid disease     Sinus arrhythmia     Chest pain     Palpitations     Fibrocystic breast disease 08/20/2014     Current Outpatient Prescriptions   Medication Sig Dispense Refill    aspirin-acetaminophen-caffeine (EXCEDRIN MIGRAINE) 250-250-65 MG per tablet Take 1 tablet by mouth every 6 hours as needed for Headaches      levothyroxine (SYNTHROID) 125 MCG tablet TAKE ONE TABLET BY MOUTH ONCE DAILY 30 tablet 5    ranitidine (ZANTAC) 300 MG tablet TAKE ONE TABLET BY MOUTH NIGHTLY 30 tablet 3    fluticasone (FLONASE) 50 MCG/ACT nasal spray 1 spray by Nasal route daily 1 Bottle 3    Cholecalciferol (VITAMIN D) 2000 units CAPS capsule Take 1 capsule by mouth daily 30 capsule 5     No current facility-administered medications for this visit. Allergies: Amoxicillin; Pcn [penicillins]; Sulfa antibiotics; and Benadryl [diphenhydramine hcl]  Past Medical History:   Diagnosis Date    Chest pain     H/O 24 hour EKG monitoring 11/3/09    48 hr holter monitor. Significant for sinus tachycardia episode the fastest of which is at 171 beats per min lasting for 3 min and 17 sec.     H/O cardiovascular stress test 1/8/2015    treadmill    H/O Social History   Substance Use Topics    Smoking status: Never Smoker    Smokeless tobacco: Never Used      Comment: reviewed 1/22/15    Alcohol use No      Review of Systems:    Constitutional: Negative for diaphoresis and fatigue  Psychological:Negative for anxiety or depression  HENT: Negative for headaches, nasal congestion, sinus pain or vertigo  Eyes: Negative for visual disturbance. Endocrine: Negative for polydipsia/polyuria  Respiratory: Negative for shortness of breath  Cardiovascular: Negative for chest pain, dyspnea on exertion, claudication, edema, irregular heartbeat, murmur, palpitations or shortness of breath  Gastrointestinal: Negative for abdominal pain or heartburn  Genito-Urinary: Negative for urinary frequency/urgency  Musculoskeletal: Negative for muscle pain, muscular weakness, negative for pain in arm and leg or swelling in foot and leg  Neurological: Negative for dizziness, headaches, memory loss, numbness/tingling, visual changes, syncope  Dermatological: Negative for rash    Objective:  /78   Pulse 72   Ht 5' 2\" (1.575 m)   Wt 208 lb (94.3 kg)   LMP 12/11/2017 (Exact Date)   BMI 38.04 kg/m²   Wt Readings from Last 3 Encounters:   01/11/18 208 lb (94.3 kg)   12/19/17 203 lb (92.1 kg)   12/01/17 203 lb 3.2 oz (92.2 kg)     Body mass index is 38.04 kg/m². GENERAL - Alert, oriented, pleasant, in no apparent distress. EYES: No jaundice, no conjunctival pallor. SKIN: It is warm & dry. No rashes. No Echhymosis    HEENT  No clinically significant abnormalities seen. Neck - Supple. No jugular venous distention noted. No carotid bruits. Cardiovascular  Normal S1 and S2 without obvious murmur or gallop. Extremities - No cyanosis, clubbing, or significant edema. Pulmonary  No respiratory distress. No wheezes or rales. Abdomen  No masses, tenderness, or organomegaly. Musculoskeletal  No significant edema. No joint deformities. No muscle wasting.   Neurologic  resolved. CAD:No  HTN:No   CARDIOMYOPATHY: None known   CONGESTIVE HEART FAILURE: NO KNOWN HISTORY.    VHD: No significant VHD noted  DYSLIPIDEMIA: Patient's profile is at / near [employer],   OTHER RELEVANT DIAGNOSIS:as noted in patient's active problem list:  TESTS ORDERED: None this visit                                      All previously ordered tests reviewed. ARRHYTHMIAS: None known   MEDICATIONS: CPM   Office f/u as needed. Primary/secondary prevention is the goal by aggressive risk modification, healthy and therapeutic life style changes for cardiovascular risk reduction. Various goals are discussed and multiple questions answered.

## 2018-01-12 ENCOUNTER — OFFICE VISIT (OUTPATIENT)
Dept: INTERNAL MEDICINE CLINIC | Age: 28
End: 2018-01-12

## 2018-01-12 VITALS
HEART RATE: 65 BPM | WEIGHT: 206.4 LBS | OXYGEN SATURATION: 99 % | SYSTOLIC BLOOD PRESSURE: 116 MMHG | DIASTOLIC BLOOD PRESSURE: 64 MMHG | BODY MASS INDEX: 37.75 KG/M2

## 2018-01-12 DIAGNOSIS — R00.2 PALPITATIONS: Primary | ICD-10-CM

## 2018-01-12 DIAGNOSIS — E07.9 THYROID DISEASE: ICD-10-CM

## 2018-01-12 DIAGNOSIS — J30.9 ACUTE ALLERGIC RHINITIS, UNSPECIFIED SEASONALITY, UNSPECIFIED TRIGGER: ICD-10-CM

## 2018-01-12 DIAGNOSIS — E55.9 VITAMIN D DEFICIENCY: ICD-10-CM

## 2018-01-12 DIAGNOSIS — K21.9 GASTROESOPHAGEAL REFLUX DISEASE WITHOUT ESOPHAGITIS: ICD-10-CM

## 2018-01-12 PROCEDURE — G8484 FLU IMMUNIZE NO ADMIN: HCPCS | Performed by: NURSE PRACTITIONER

## 2018-01-12 PROCEDURE — 99213 OFFICE O/P EST LOW 20 MIN: CPT | Performed by: NURSE PRACTITIONER

## 2018-01-12 PROCEDURE — G8417 CALC BMI ABV UP PARAM F/U: HCPCS | Performed by: NURSE PRACTITIONER

## 2018-01-12 PROCEDURE — 1036F TOBACCO NON-USER: CPT | Performed by: NURSE PRACTITIONER

## 2018-01-12 PROCEDURE — G8427 DOCREV CUR MEDS BY ELIG CLIN: HCPCS | Performed by: NURSE PRACTITIONER

## 2018-01-12 RX ORDER — LEVOTHYROXINE SODIUM 0.12 MG/1
TABLET ORAL
Qty: 30 TABLET | Refills: 5 | Status: SHIPPED | OUTPATIENT
Start: 2018-01-12 | End: 2018-12-18 | Stop reason: SDUPTHER

## 2018-01-12 RX ORDER — RANITIDINE 300 MG/1
TABLET ORAL
Qty: 30 TABLET | Refills: 3 | Status: SHIPPED | OUTPATIENT
Start: 2018-01-12 | End: 2019-06-24 | Stop reason: ALTCHOICE

## 2018-01-12 RX ORDER — FLUTICASONE PROPIONATE 50 MCG
1 SPRAY, SUSPENSION (ML) NASAL DAILY
Qty: 1 BOTTLE | Refills: 3 | Status: SHIPPED | OUTPATIENT
Start: 2018-01-12 | End: 2018-10-18

## 2018-01-12 ASSESSMENT — PATIENT HEALTH QUESTIONNAIRE - PHQ9
2. FEELING DOWN, DEPRESSED OR HOPELESS: 0
SUM OF ALL RESPONSES TO PHQ9 QUESTIONS 1 & 2: 0
1. LITTLE INTEREST OR PLEASURE IN DOING THINGS: 0
SUM OF ALL RESPONSES TO PHQ QUESTIONS 1-9: 0

## 2018-01-12 NOTE — PROGRESS NOTES
Bryanna Guerra   28 y.o.  female  [id number]    Chief Complaint   Patient presents with    1 Month Follow-Up     saw Dr. Nkechi Diaz yesterday-all results were good      Subjective:  28 y. o.female is here for a follow up. She has the following chronic/acute medical problems:    Palpitations  Patient had office visit with Dr Nkechi Diaz yesterday. ECHO/Stress test were normal.  Patient stopped Vit D a couple weeks ago and states her palpitations stopped. Patient's reflux well controlled on current medications. Patient denies any blood in stool black stool, heartburn, reflux. Patient's allergic rhinitis well controlled with current medications. Patient denies any rhinorrhea, nasal congestion, post nasal drip. Patient with follow up of hypothyroidism. Symptoms include none. Patient denies change in energy level, diarrhea, heat / cold intolerance, nervousness and weight changes. Onset of symptoms was several years ago. Symptoms have been well-controlled. Review of Systems    Current Outpatient Prescriptions   Medication Sig Dispense Refill    levothyroxine (SYNTHROID) 125 MCG tablet TAKE ONE TABLET BY MOUTH ONCE DAILY 30 tablet 5    ranitidine (ZANTAC) 300 MG tablet TAKE ONE TABLET BY MOUTH NIGHTLY 30 tablet 3    fluticasone (FLONASE) 50 MCG/ACT nasal spray 1 spray by Nasal route daily 1 Bottle 3    aspirin-acetaminophen-caffeine (EXCEDRIN MIGRAINE) 250-250-65 MG per tablet Take 1 tablet by mouth every 6 hours as needed for Headaches       No current facility-administered medications for this visit.        Objective:  /64 (Site: Left Arm, Position: Sitting, Cuff Size: Large Adult)   Pulse 65   Wt 206 lb 6.4 oz (93.6 kg)   LMP 12/11/2017 (Exact Date)   SpO2 99%   BMI 37.75 kg/m²   BP Readings from Last 3 Encounters:   01/12/18 116/64   01/11/18 128/78   12/19/17 (!) 142/82     Wt Readings from Last 3 Encounters:   01/12/18 206 lb 6.4 oz (93.6 kg)   01/11/18 208 lb (94.3 kg)   12/19/17 203 lb (92.1 medication  4. Stopped Vit D supplement; will recheck in 3 months. Care discussed with patient. Questions answered. Patient verbalizes understanding and agrees with plan. After visit summary provided. Advised to call for any problems, questions, or concerns. Orders Placed This Encounter   Medications    levothyroxine (SYNTHROID) 125 MCG tablet     Sig: TAKE ONE TABLET BY MOUTH ONCE DAILY     Dispense:  30 tablet     Refill:  5    ranitidine (ZANTAC) 300 MG tablet     Sig: TAKE ONE TABLET BY MOUTH NIGHTLY     Dispense:  30 tablet     Refill:  3    fluticasone (FLONASE) 50 MCG/ACT nasal spray     Si spray by Nasal route daily     Dispense:  1 Bottle     Refill:  3       Return in about 3 months (around 2018).     Cj Sahu NP  18  12:33 PM

## 2018-01-15 ENCOUNTER — OFFICE VISIT (OUTPATIENT)
Dept: INTERNAL MEDICINE CLINIC | Age: 28
End: 2018-01-15

## 2018-01-15 VITALS — SYSTOLIC BLOOD PRESSURE: 92 MMHG | DIASTOLIC BLOOD PRESSURE: 64 MMHG | HEART RATE: 53 BPM | OXYGEN SATURATION: 98 %

## 2018-01-15 DIAGNOSIS — N30.10 INTERSTITIAL CYSTITIS: Primary | Chronic | ICD-10-CM

## 2018-01-15 DIAGNOSIS — R10.13 EPIGASTRIC PAIN: ICD-10-CM

## 2018-01-15 DIAGNOSIS — M54.50 ACUTE BILATERAL LOW BACK PAIN WITHOUT SCIATICA: ICD-10-CM

## 2018-01-15 LAB
BILIRUBIN, POC: NEGATIVE
BLOOD URINE, POC: NEGATIVE
CLARITY, POC: CLEAR
COLOR, POC: YELLOW
GLUCOSE URINE, POC: NEGATIVE
KETONES, POC: NEGATIVE
LEUKOCYTE EST, POC: NEGATIVE
NITRITE, POC: NEGATIVE
PH, POC: 6
PROTEIN, POC: NEGATIVE
SPECIFIC GRAVITY, POC: 1.01
UROBILINOGEN, POC: 0.2

## 2018-01-15 PROCEDURE — 99213 OFFICE O/P EST LOW 20 MIN: CPT | Performed by: NURSE PRACTITIONER

## 2018-01-15 PROCEDURE — G8484 FLU IMMUNIZE NO ADMIN: HCPCS | Performed by: NURSE PRACTITIONER

## 2018-01-15 PROCEDURE — 81002 URINALYSIS NONAUTO W/O SCOPE: CPT | Performed by: NURSE PRACTITIONER

## 2018-01-15 PROCEDURE — G8417 CALC BMI ABV UP PARAM F/U: HCPCS | Performed by: NURSE PRACTITIONER

## 2018-01-15 PROCEDURE — G8427 DOCREV CUR MEDS BY ELIG CLIN: HCPCS | Performed by: NURSE PRACTITIONER

## 2018-01-15 PROCEDURE — 1036F TOBACCO NON-USER: CPT | Performed by: NURSE PRACTITIONER

## 2018-01-17 LAB
ORGANISM: ABNORMAL
URINE CULTURE, ROUTINE: ABNORMAL
URINE CULTURE, ROUTINE: ABNORMAL

## 2018-01-18 DIAGNOSIS — N30.00 ACUTE CYSTITIS WITHOUT HEMATURIA: Primary | ICD-10-CM

## 2018-01-18 RX ORDER — CIPROFLOXACIN 250 MG/1
250 TABLET, FILM COATED ORAL 2 TIMES DAILY
Qty: 10 TABLET | Refills: 0 | Status: SHIPPED | OUTPATIENT
Start: 2018-01-18 | End: 2018-01-23

## 2018-02-05 ENCOUNTER — OFFICE VISIT (OUTPATIENT)
Dept: INTERNAL MEDICINE CLINIC | Age: 28
End: 2018-02-05

## 2018-02-05 VITALS
OXYGEN SATURATION: 98 % | HEART RATE: 56 BPM | TEMPERATURE: 98.7 F | SYSTOLIC BLOOD PRESSURE: 108 MMHG | DIASTOLIC BLOOD PRESSURE: 70 MMHG

## 2018-02-05 DIAGNOSIS — J06.9 UPPER RESPIRATORY TRACT INFECTION, UNSPECIFIED TYPE: Primary | ICD-10-CM

## 2018-02-05 DIAGNOSIS — J02.9 SORE THROAT: ICD-10-CM

## 2018-02-05 LAB — S PYO AG THROAT QL: NORMAL

## 2018-02-05 PROCEDURE — G8427 DOCREV CUR MEDS BY ELIG CLIN: HCPCS | Performed by: NURSE PRACTITIONER

## 2018-02-05 PROCEDURE — G8417 CALC BMI ABV UP PARAM F/U: HCPCS | Performed by: NURSE PRACTITIONER

## 2018-02-05 PROCEDURE — 87880 STREP A ASSAY W/OPTIC: CPT | Performed by: NURSE PRACTITIONER

## 2018-02-05 PROCEDURE — G8484 FLU IMMUNIZE NO ADMIN: HCPCS | Performed by: NURSE PRACTITIONER

## 2018-02-05 PROCEDURE — 99213 OFFICE O/P EST LOW 20 MIN: CPT | Performed by: NURSE PRACTITIONER

## 2018-02-05 PROCEDURE — 1036F TOBACCO NON-USER: CPT | Performed by: NURSE PRACTITIONER

## 2018-02-05 NOTE — PATIENT INSTRUCTIONS
mucus from your lungs by breathing deeply and coughing. · Gargle with warm salt water once an hour. This can help reduce swelling and throat pain. Use 1 teaspoon of salt mixed in 1 cup of warm water. · Do not smoke or allow others to smoke around you. If you need help quitting, talk to your doctor about stop-smoking programs and medicines. These can increase your chances of quitting for good. To avoid spreading the virus  · Cough or sneeze into a tissue. Then throw the tissue away. · If you don't have a tissue, use your hand to cover your cough or sneeze. Then clean your hand. You can also cough into your sleeve. · Wash your hands often. Use soap and warm water. Wash for 15 to 20 seconds each time. · If you don't have soap and water near you, you can clean your hands with alcohol wipes or gel. When should you call for help? Call your doctor now or seek immediate medical care if:  ? · You have a new or higher fever. ? · Your fever lasts more than 48 hours. ? · You have trouble breathing. ? · You have a fever with a stiff neck or a severe headache. ? · You are sensitive to light. ? · You feel very sleepy or confused. ? Watch closely for changes in your health, and be sure to contact your doctor if:  ? · You do not get better as expected. Where can you learn more? Go to [email or web address] and sign in to your [employer] account. Enter Q639 in the [facility] box to learn more about \"Viral Respiratory Infection: Care Instructions. \"     If you do not have an account, please click on the \"Sign Up Now\" link. Current as of: May 12, 2017  Content Version: 11.5  © 0034-5975 [employer]. Care instructions adapted under license by [facility] ([facility]). If you have questions about a medical condition or this instruction, always ask your healthcare professional. [address] any warranty or liability for your use of this information.

## 2018-02-09 ENCOUNTER — TELEPHONE (OUTPATIENT)
Dept: INTERNAL MEDICINE CLINIC | Age: 28
End: 2018-02-09

## 2018-02-09 DIAGNOSIS — J06.9 UPPER RESPIRATORY TRACT INFECTION, UNSPECIFIED TYPE: Primary | ICD-10-CM

## 2018-02-09 RX ORDER — AZITHROMYCIN 250 MG/1
TABLET, FILM COATED ORAL
Qty: 1 PACKET | Refills: 0 | Status: SHIPPED | OUTPATIENT
Start: 2018-02-09 | End: 2018-02-23 | Stop reason: ALTCHOICE

## 2018-02-09 NOTE — TELEPHONE ENCOUNTER
Pt left message stating she would like something for her cold, as she's not any better. Please advise.

## 2018-02-21 ENCOUNTER — TELEPHONE (OUTPATIENT)
Dept: CARDIOLOGY CLINIC | Age: 28
End: 2018-02-21

## 2018-02-23 ENCOUNTER — OFFICE VISIT (OUTPATIENT)
Dept: INTERNAL MEDICINE CLINIC | Age: 28
End: 2018-02-23

## 2018-02-23 VITALS
SYSTOLIC BLOOD PRESSURE: 96 MMHG | DIASTOLIC BLOOD PRESSURE: 64 MMHG | HEART RATE: 62 BPM | TEMPERATURE: 98.6 F | OXYGEN SATURATION: 98 %

## 2018-02-23 DIAGNOSIS — J06.9 VIRAL UPPER RESPIRATORY TRACT INFECTION: Primary | ICD-10-CM

## 2018-02-23 PROCEDURE — G8484 FLU IMMUNIZE NO ADMIN: HCPCS | Performed by: NURSE PRACTITIONER

## 2018-02-23 PROCEDURE — 1036F TOBACCO NON-USER: CPT | Performed by: NURSE PRACTITIONER

## 2018-02-23 PROCEDURE — G8417 CALC BMI ABV UP PARAM F/U: HCPCS | Performed by: NURSE PRACTITIONER

## 2018-02-23 PROCEDURE — 99213 OFFICE O/P EST LOW 20 MIN: CPT | Performed by: NURSE PRACTITIONER

## 2018-02-23 PROCEDURE — G8427 DOCREV CUR MEDS BY ELIG CLIN: HCPCS | Performed by: NURSE PRACTITIONER

## 2018-02-23 NOTE — PROGRESS NOTES
these symptoms worsen or fail to improve as anticipated.     Diagnoses and all orders for this visit:    Viral upper respiratory tract infection

## 2018-02-23 NOTE — PATIENT INSTRUCTIONS
mucus from your lungs by breathing deeply and coughing. · Gargle with warm salt water once an hour. This can help reduce swelling and throat pain. Use 1 teaspoon of salt mixed in 1 cup of warm water. · Do not smoke or allow others to smoke around you. If you need help quitting, talk to your doctor about stop-smoking programs and medicines. These can increase your chances of quitting for good. To avoid spreading the virus  · Cough or sneeze into a tissue. Then throw the tissue away. · If you don't have a tissue, use your hand to cover your cough or sneeze. Then clean your hand. You can also cough into your sleeve. · Wash your hands often. Use soap and warm water. Wash for 15 to 20 seconds each time. · If you don't have soap and water near you, you can clean your hands with alcohol wipes or gel. When should you call for help? Call your doctor now or seek immediate medical care if:  ? · You have a new or higher fever. ? · Your fever lasts more than 48 hours. ? · You have trouble breathing. ? · You have a fever with a stiff neck or a severe headache. ? · You are sensitive to light. ? · You feel very sleepy or confused. ? Watch closely for changes in your health, and be sure to contact your doctor if:  ? · You do not get better as expected. Where can you learn more? Go to [email or web address]://[email or web address] [facility] [email or web address] [email or web address] org and sign in to your [employer] account. Enter M385 in the [facility] box to learn more about \"Viral Respiratory Infection: Care Instructions. \"     If you do not have an account, please click on the \"Sign Up Now\" link. Current as of: May 12, 2017  Content Version: 11.5  © 0309-0851 [employer]. Care instructions adapted under license by [facility] [place] ([facility]). If you have questions about a medical condition or this instruction, always ask your healthcare professional. [address] any warranty or liability for your use of this information.

## 2018-03-06 ENCOUNTER — HOSPITAL ENCOUNTER (OUTPATIENT)
Dept: OTHER | Age: 28
Discharge: HOME | End: 2018-03-06
Attending: CLINIC/CENTER | Admitting: CLINIC/CENTER

## 2018-03-06 LAB
BACTERIA: ABNORMAL /HPF
BILIRUBIN URINE: NEGATIVE MG/DL
BLOOD, URINE: NEGATIVE
CLARITY: ABNORMAL
COLOR: YELLOW
GLUCOSE, URINE: NEGATIVE MG/DL
KETONES, URINE: NEGATIVE MG/DL
LEUKOCYTE ESTERASE, URINE: NEGATIVE
MUCUS: ABNORMAL HPF
NITRITE URINE, QUANTITATIVE: NEGATIVE
PH, URINE: 6 (ref 5–8)
PROTEIN UA: NEGATIVE MG/DL
RBC URINE: 2 /HPF (ref 0–6)
SPECIFIC GRAVITY UA: 1.02 (ref 1–1.03)
SQUAMOUS EPITHELIAL: 4 /HPF
TRICHOMONAS: ABNORMAL /HPF
UROBILINOGEN, URINE: 2 MG/DL (ref 0.2–1)
WBC UA: 2 /HPF (ref 0–5)

## 2018-03-10 LAB
CULTURE: NORMAL
CULTURE: NORMAL
ORGANISM: NORMAL
ORGANISM: NORMAL
REPORT STATUS: NORMAL
REQUEST PROBLEM: NORMAL
SPECIMEN: NORMAL
TOTAL COLONY COUNT: NORMAL

## 2018-04-12 ENCOUNTER — OFFICE VISIT (OUTPATIENT)
Dept: INTERNAL MEDICINE CLINIC | Age: 28
End: 2018-04-12

## 2018-04-12 VITALS
SYSTOLIC BLOOD PRESSURE: 116 MMHG | WEIGHT: 202 LBS | OXYGEN SATURATION: 98 % | HEIGHT: 62 IN | BODY MASS INDEX: 37.17 KG/M2 | HEART RATE: 84 BPM | DIASTOLIC BLOOD PRESSURE: 76 MMHG

## 2018-04-12 DIAGNOSIS — Z91.09 ENVIRONMENTAL ALLERGIES: ICD-10-CM

## 2018-04-12 DIAGNOSIS — E55.9 VITAMIN D DEFICIENCY: ICD-10-CM

## 2018-04-12 DIAGNOSIS — E07.9 THYROID DISEASE: ICD-10-CM

## 2018-04-12 DIAGNOSIS — K59.00 CONSTIPATION, UNSPECIFIED CONSTIPATION TYPE: Primary | ICD-10-CM

## 2018-04-12 LAB
TSH SERPL DL<=0.05 MIU/L-ACNC: 2.09 UIU/ML (ref 0.27–4.2)
VITAMIN D 25-HYDROXY: 25.7 NG/ML

## 2018-04-12 PROCEDURE — G8427 DOCREV CUR MEDS BY ELIG CLIN: HCPCS | Performed by: NURSE PRACTITIONER

## 2018-04-12 PROCEDURE — 1036F TOBACCO NON-USER: CPT | Performed by: NURSE PRACTITIONER

## 2018-04-12 PROCEDURE — G8417 CALC BMI ABV UP PARAM F/U: HCPCS | Performed by: NURSE PRACTITIONER

## 2018-04-12 PROCEDURE — 99213 OFFICE O/P EST LOW 20 MIN: CPT | Performed by: NURSE PRACTITIONER

## 2018-04-12 RX ORDER — DOCUSATE SODIUM 100 MG/1
100 CAPSULE, LIQUID FILLED ORAL DAILY PRN
Qty: 30 CAPSULE | Refills: 3 | Status: SHIPPED | OUTPATIENT
Start: 2018-04-12 | End: 2019-08-09 | Stop reason: SDUPTHER

## 2018-04-12 RX ORDER — LORATADINE 10 MG/1
10 TABLET ORAL DAILY
Qty: 30 TABLET | Refills: 5 | Status: SHIPPED | OUTPATIENT
Start: 2018-04-12 | End: 2018-09-13

## 2018-04-12 ASSESSMENT — ENCOUNTER SYMPTOMS
COUGH: 0
VOMITING: 0
CHEST TIGHTNESS: 0
RHINORRHEA: 0
ABDOMINAL PAIN: 1
SINUS PAIN: 1
SHORTNESS OF BREATH: 0
EYE PAIN: 0
CONSTIPATION: 1
EYE ITCHING: 1
NAUSEA: 1
SINUS PRESSURE: 1

## 2018-04-13 DIAGNOSIS — E55.9 VITAMIN D DEFICIENCY: Primary | ICD-10-CM

## 2018-06-04 ENCOUNTER — OFFICE VISIT (OUTPATIENT)
Dept: INTERNAL MEDICINE CLINIC | Age: 28
End: 2018-06-04

## 2018-06-04 VITALS — OXYGEN SATURATION: 98 % | DIASTOLIC BLOOD PRESSURE: 64 MMHG | HEART RATE: 76 BPM | SYSTOLIC BLOOD PRESSURE: 104 MMHG

## 2018-06-04 DIAGNOSIS — N39.9 URINARY DISORDER: Primary | ICD-10-CM

## 2018-06-04 LAB
BILIRUBIN, POC: NEGATIVE
BLOOD URINE, POC: NEGATIVE
CLARITY, POC: NORMAL
COLOR, POC: YELLOW
GLUCOSE URINE, POC: NEGATIVE
KETONES, POC: NEGATIVE
LEUKOCYTE EST, POC: NEGATIVE
NITRITE, POC: NEGATIVE
PH, POC: 5.5
PROTEIN, POC: NEGATIVE
SPECIFIC GRAVITY, POC: 1.01
UROBILINOGEN, POC: 0.2

## 2018-06-04 PROCEDURE — 99213 OFFICE O/P EST LOW 20 MIN: CPT | Performed by: NURSE PRACTITIONER

## 2018-06-04 PROCEDURE — 81002 URINALYSIS NONAUTO W/O SCOPE: CPT | Performed by: NURSE PRACTITIONER

## 2018-06-04 PROCEDURE — G8417 CALC BMI ABV UP PARAM F/U: HCPCS | Performed by: NURSE PRACTITIONER

## 2018-06-04 PROCEDURE — 1036F TOBACCO NON-USER: CPT | Performed by: NURSE PRACTITIONER

## 2018-06-04 PROCEDURE — G8427 DOCREV CUR MEDS BY ELIG CLIN: HCPCS | Performed by: NURSE PRACTITIONER

## 2018-07-10 ENCOUNTER — OFFICE VISIT (OUTPATIENT)
Dept: INTERNAL MEDICINE CLINIC | Age: 28
End: 2018-07-10

## 2018-07-10 ENCOUNTER — HOSPITAL ENCOUNTER (OUTPATIENT)
Dept: OTHER | Age: 28
Discharge: HOME | End: 2018-07-10
Attending: NURSE PRACTITIONER | Admitting: NURSE PRACTITIONER

## 2018-07-10 VITALS — DIASTOLIC BLOOD PRESSURE: 72 MMHG | HEART RATE: 61 BPM | SYSTOLIC BLOOD PRESSURE: 104 MMHG | OXYGEN SATURATION: 98 %

## 2018-07-10 DIAGNOSIS — R06.02 SHORTNESS OF BREATH: ICD-10-CM

## 2018-07-10 DIAGNOSIS — R00.2 PALPITATIONS: ICD-10-CM

## 2018-07-10 DIAGNOSIS — R30.0 DYSURIA: ICD-10-CM

## 2018-07-10 DIAGNOSIS — R06.02 SHORTNESS OF BREATH: Primary | ICD-10-CM

## 2018-07-10 DIAGNOSIS — Z90.722 S/P BILATERAL SALPINGO-OOPHORECTOMY: ICD-10-CM

## 2018-07-10 LAB
ANION GAP SERPL CALCULATED.3IONS-SCNC: 11 MMOL/L (ref 4–16)
BILIRUBIN, POC: ABNORMAL
BLOOD URINE, POC: ABNORMAL
BUN BLDV-MCNC: 12 MG/DL (ref 6–23)
CALCIUM SERPL-MCNC: 9.3 MG/DL (ref 8.3–10.6)
CHLORIDE BLD-SCNC: 101 MMOL/L (ref 99–110)
CLARITY, POC: CLEAR
CO2: 26 MMOL/L (ref 21–32)
COLOR, POC: ABNORMAL
CREAT SERPL-MCNC: 0.7 MG/DL (ref 0.6–1.1)
D DIMER: 378 NG/ML(DDU)
GFR AFRICAN AMERICAN: >60 ML/MIN/1.73M2
GFR NON-AFRICAN AMERICAN: >60 ML/MIN/1.73M2
GLUCOSE BLD-MCNC: 97 MG/DL (ref 70–99)
GLUCOSE URINE, POC: ABNORMAL
HCT VFR BLD CALC: 39.4 % (ref 37–47)
HEMOGLOBIN: 13 GM/DL (ref 12.5–16)
KETONES, POC: ABNORMAL
LEUKOCYTE EST, POC: ABNORMAL
MCH RBC QN AUTO: 31.3 PG (ref 27–31)
MCHC RBC AUTO-ENTMCNC: 33 % (ref 32–36)
MCV RBC AUTO: 94.9 FL (ref 78–100)
NITRITE, POC: ABNORMAL
PDW BLD-RTO: 12.1 % (ref 11.7–14.9)
PH, POC: 5.5
PLATELET # BLD: 247 K/CU MM (ref 140–440)
PMV BLD AUTO: 10.2 FL (ref 7.5–11.1)
POTASSIUM SERPL-SCNC: 4.3 MMOL/L (ref 3.5–5.1)
PROTEIN, POC: ABNORMAL
RBC # BLD: 4.15 M/CU MM (ref 4.2–5.4)
SODIUM BLD-SCNC: 138 MMOL/L (ref 135–145)
SPECIFIC GRAVITY, POC: <=1.005
UROBILINOGEN, POC: 0.2
WBC # BLD: 6.9 K/CU MM (ref 4–10.5)

## 2018-07-10 PROCEDURE — 81002 URINALYSIS NONAUTO W/O SCOPE: CPT | Performed by: NURSE PRACTITIONER

## 2018-07-10 PROCEDURE — 99214 OFFICE O/P EST MOD 30 MIN: CPT | Performed by: NURSE PRACTITIONER

## 2018-07-10 PROCEDURE — 1036F TOBACCO NON-USER: CPT | Performed by: NURSE PRACTITIONER

## 2018-07-10 PROCEDURE — G8417 CALC BMI ABV UP PARAM F/U: HCPCS | Performed by: NURSE PRACTITIONER

## 2018-07-10 PROCEDURE — G8427 DOCREV CUR MEDS BY ELIG CLIN: HCPCS | Performed by: NURSE PRACTITIONER

## 2018-07-10 RX ORDER — MECLIZINE HYDROCHLORIDE 25 MG/1
25 TABLET ORAL DAILY
COMMUNITY
End: 2018-09-13

## 2018-07-10 NOTE — PROGRESS NOTES
Humza Reddy   33 y.o.  female  [id number]    Chief Complaint   Patient presents with    Other     1 week post surgery-c/o heart fluttering/SOB, stating she feels better today than the last 4 days-wants urine checked        Subjective:  33 y. o.female is here for a follow up. She has the following chronic/acute medical problems:     Patient Active Problem List   Diagnosis    Fibrocystic breast disease    Thyroid disease    Sinus arrhythmia    Chest pain    Palpitations    Interstitial cystitis    Vitamin D deficiency     She is here today for a 5 day history of shortness of breath with exertion. She had surgery on July 2, 2018; bilateral salpingo-oophorectomy. On July 5, 2018 she had a sudden onset of left arm pain, tingling and felt as if the pain moved up her arm and settled in her left anterior chest.  She immediately felt short of breath and had heart palpitations. She states that the shortness of breath and sharp pain continued to progress over the weekend and peaked on Sunday while she was trying to grocery shop with her . She states that the symptoms increased and she felt anxious. She called Dr. Edin Hernandez office yesterday and they directed her to go to the ER. She did not feel that she needed to go to the ER and made an appointment with us today. She denies headache, weakness, fever, chills, or respiratory distress.         Review of Systems    Current Outpatient Prescriptions   Medication Sig Dispense Refill    meclizine (ANTIVERT) 25 MG tablet Take 25 mg by mouth daily      vitamin D (CHOLECALCIFEROL) 1000 UNIT TABS tablet Take 1 tablet by mouth daily 30 tablet 3    loratadine (CLARITIN) 10 MG tablet Take 1 tablet by mouth daily 30 tablet 5    docusate sodium (COLACE) 100 MG capsule Take 1 capsule by mouth daily as needed for Constipation 30 capsule 3    Pseudoephedrine-Ibuprofen (ADVIL COLD/SINUS)  MG TABS Take 1 tablet by mouth 2 times daily      levothyroxine

## 2018-07-11 ENCOUNTER — TELEPHONE (OUTPATIENT)
Dept: FAMILY MEDICINE CLINIC | Age: 28
End: 2018-07-11

## 2018-07-11 NOTE — TELEPHONE ENCOUNTER
Received phone call from patient on Tuesday, July 10 at [facility] PM.  Patient is requesting to discuss her lab results with me in the. She states that she is looked at my chart and noticed that \"all my levels are normal\". Patient denies that she is having an emergency or any concern she just wanted to discuss her lab results. Patient was advised she needs to call the office during regular business hours to discuss her question.

## 2018-07-12 LAB — URINE CULTURE, ROUTINE: NORMAL

## 2018-07-17 ENCOUNTER — OFFICE VISIT (OUTPATIENT)
Dept: INTERNAL MEDICINE CLINIC | Age: 28
End: 2018-07-17

## 2018-07-17 VITALS — SYSTOLIC BLOOD PRESSURE: 98 MMHG | HEART RATE: 55 BPM | DIASTOLIC BLOOD PRESSURE: 68 MMHG | OXYGEN SATURATION: 99 %

## 2018-07-17 DIAGNOSIS — R06.00 DYSPNEA, UNSPECIFIED TYPE: Primary | ICD-10-CM

## 2018-07-17 DIAGNOSIS — N30.10 CYSTITIS, INTERSTITIAL: ICD-10-CM

## 2018-07-17 PROCEDURE — 1036F TOBACCO NON-USER: CPT | Performed by: NURSE PRACTITIONER

## 2018-07-17 PROCEDURE — G8427 DOCREV CUR MEDS BY ELIG CLIN: HCPCS | Performed by: NURSE PRACTITIONER

## 2018-07-17 PROCEDURE — G8417 CALC BMI ABV UP PARAM F/U: HCPCS | Performed by: NURSE PRACTITIONER

## 2018-07-17 PROCEDURE — 99213 OFFICE O/P EST LOW 20 MIN: CPT | Performed by: NURSE PRACTITIONER

## 2018-07-17 NOTE — PROGRESS NOTES
Meaghan Bañuelos   33 y.o.  female  [id number]    Chief Complaint   Patient presents with    Other     1 week f/u-doing well        Subjective:  33 y. o.female is here for a follow up. She has the following chronic/acute medical problems:     Patient Active Problem List   Diagnosis    Fibrocystic breast disease    Thyroid disease    Sinus arrhythmia    Chest pain    Palpitations    Interstitial cystitis    Vitamin D deficiency     Here for follow-up for shortness of breath and chest pain that she was seen for last week. Lab results showed an elevated DDimer and was sent to the ED for chest CT. The chest CT was negative for P.E. Shortness of breath and chest pain has completely resolved since Friday. Minimal palpitations, which are chronic. Urinary frequency continues. Denies dysuria, fevers, or chills. She states \"It is just my interstitial cystitis\". Review of Systems    Current Outpatient Prescriptions   Medication Sig Dispense Refill    meclizine (ANTIVERT) 25 MG tablet Take 25 mg by mouth daily      vitamin D (CHOLECALCIFEROL) 1000 UNIT TABS tablet Take 1 tablet by mouth daily 30 tablet 3    loratadine (CLARITIN) 10 MG tablet Take 1 tablet by mouth daily 30 tablet 5    docusate sodium (COLACE) 100 MG capsule Take 1 capsule by mouth daily as needed for Constipation 30 capsule 3    Pseudoephedrine-Ibuprofen (ADVIL COLD/SINUS)  MG TABS Take 1 tablet by mouth 2 times daily      levothyroxine (SYNTHROID) 125 MCG tablet TAKE ONE TABLET BY MOUTH ONCE DAILY 30 tablet 5    ranitidine (ZANTAC) 300 MG tablet TAKE ONE TABLET BY MOUTH NIGHTLY 30 tablet 3    fluticasone (FLONASE) 50 MCG/ACT nasal spray 1 spray by Nasal route daily 1 Bottle 3    aspirin-acetaminophen-caffeine (EXCEDRIN MIGRAINE) 250-250-65 MG per tablet Take 1 tablet by mouth every 6 hours as needed for Headaches       No current facility-administered medications for this visit.        Objective:  BP 98/68 (Site: Left Arm, Position: 04/23/2012     Lab Results   Component Value Date    TRIG 50 04/23/2012     Lab Results   Component Value Date    HDL 59 (L) 04/23/2012     No results found for: LDLCALC, LDLCHOLESTEROL  No results found for: LABA1C  Lab Results   Component Value Date    TSH 2.09 04/12/2018    TSHHS 1.650 11/26/2017       ASSESSMENT:      1. Dyspnea, unspecified type    2. Cystitis, interstitial        PLAN:  1. Dyspnea:  Her shortness of breath has resolved. Activity as tolerated. Continue healthy lifestyle changes. 2. Interstitial cystitis:  Continue to drink plenty of fluids, empty bladder often and report dysuria. Care discussed with patient. Questions answered. Patient verbalizes understanding and agrees with plan. After visit summary provided. Advised to call for any problems, questions, or concerns. Return if symptoms worsen or fail to improve.     BURTON Herrera CNP  07/17/18  10:01 AM

## 2018-09-13 ENCOUNTER — OFFICE VISIT (OUTPATIENT)
Dept: FAMILY MEDICINE CLINIC | Age: 28
End: 2018-09-13

## 2018-09-13 VITALS
DIASTOLIC BLOOD PRESSURE: 70 MMHG | HEART RATE: 72 BPM | HEIGHT: 62 IN | WEIGHT: 196.8 LBS | SYSTOLIC BLOOD PRESSURE: 112 MMHG | BODY MASS INDEX: 36.22 KG/M2 | TEMPERATURE: 98.6 F | OXYGEN SATURATION: 99 %

## 2018-09-13 DIAGNOSIS — J06.9 UPPER RESPIRATORY TRACT INFECTION, UNSPECIFIED TYPE: Primary | ICD-10-CM

## 2018-09-13 DIAGNOSIS — R00.0 TACHYCARDIA: ICD-10-CM

## 2018-09-13 PROCEDURE — 1036F TOBACCO NON-USER: CPT | Performed by: NURSE PRACTITIONER

## 2018-09-13 PROCEDURE — 99213 OFFICE O/P EST LOW 20 MIN: CPT | Performed by: NURSE PRACTITIONER

## 2018-09-13 PROCEDURE — G8417 CALC BMI ABV UP PARAM F/U: HCPCS | Performed by: NURSE PRACTITIONER

## 2018-09-13 PROCEDURE — G8427 DOCREV CUR MEDS BY ELIG CLIN: HCPCS | Performed by: NURSE PRACTITIONER

## 2018-09-13 NOTE — PROGRESS NOTES
oropharyngeal edema, posterior oropharyngeal erythema or tonsillar abscesses. Eyes: Conjunctivae are normal. Right eye exhibits no discharge. Left eye exhibits no discharge. Neck: Normal range of motion. Neck supple. Cardiovascular: Normal rate, regular rhythm and normal heart sounds. Exam reveals no gallop and no friction rub. No murmur heard. Pulmonary/Chest: Effort normal and breath sounds normal. No respiratory distress. She has no wheezes. She has no rales. She exhibits no tenderness. Musculoskeletal: Normal range of motion. Lymphadenopathy:     She has no cervical adenopathy. Neurological: She is alert and oriented to person, place, and time. Skin: Skin is warm. No rash noted. She is not diaphoretic. Psychiatric: She has a normal mood and affect. Her behavior is normal.   Nursing note and vitals reviewed. ASSESSMENT/PLAN:     Diagnosis Orders   1. Upper respiratory tract infection, unspecified type     2. Tachycardia     1&2. Likely viral etiology and recommended symptomatic treatment->rest, fluids, good hand hygiene. I suspect the cause of her increased heart rate and \"jittery\" feeling yesterday was a combination of caffeine and cold medicine. Recommended that she avoid caffeine while taking cold medicine. We discussed signs and/or symptoms that warrant a prompt re-evaluation otherwise follow-up if no improvement in symptoms in 3 days. Pt voices understanding and is agreeable to plan.      Current Outpatient Prescriptions   Medication Sig Dispense Refill    Pseudoephedrine-Ibuprofen (ADVIL COLD/SINUS)  MG TABS Take 1 tablet by mouth 2 times daily      levothyroxine (SYNTHROID) 125 MCG tablet TAKE ONE TABLET BY MOUTH ONCE DAILY 30 tablet 5    ranitidine (ZANTAC) 300 MG tablet TAKE ONE TABLET BY MOUTH NIGHTLY 30 tablet 3    fluticasone (FLONASE) 50 MCG/ACT nasal spray 1 spray by Nasal route daily 1 Bottle 3    aspirin-acetaminophen-caffeine ([facility]) 427-676-54 MG per tablet Take 1 tablet by mouth every 6 hours as needed for Headaches       No current facility-administered medications for this visit. No orders of the defined types were placed in this encounter. Return if symptoms worsen or fail to improve. Patient Instructions   Push fluids- at least 1/2 your weight in ounces (non-caffeinated fluids!). Avoid caffeine and decongestant (pseudoephedrine or phenylephrine) combination. If symptoms do not improve in 3 days then return to clinic.        Controlled Substances Monitoring:

## 2018-09-24 ASSESSMENT — ENCOUNTER SYMPTOMS
SORE THROAT: 0
SHORTNESS OF BREATH: 0
EYES NEGATIVE: 1
CHEST TIGHTNESS: 0
RHINORRHEA: 1
DIARRHEA: 0
NAUSEA: 1
ABDOMINAL PAIN: 0
TROUBLE SWALLOWING: 0
SINUS PRESSURE: 1
VOMITING: 0
COUGH: 0

## 2018-10-18 ENCOUNTER — OFFICE VISIT (OUTPATIENT)
Dept: FAMILY MEDICINE CLINIC | Age: 28
End: 2018-10-18
Payer: COMMERCIAL

## 2018-10-18 VITALS
DIASTOLIC BLOOD PRESSURE: 80 MMHG | BODY MASS INDEX: 36.21 KG/M2 | HEART RATE: 73 BPM | SYSTOLIC BLOOD PRESSURE: 132 MMHG | OXYGEN SATURATION: 99 % | TEMPERATURE: 97.8 F | WEIGHT: 204.4 LBS | HEIGHT: 63 IN

## 2018-10-18 DIAGNOSIS — Z13.1 SCREENING FOR DIABETES MELLITUS: ICD-10-CM

## 2018-10-18 DIAGNOSIS — R10.9 FLANK PAIN: Primary | ICD-10-CM

## 2018-10-18 DIAGNOSIS — E07.9 THYROID DISEASE: ICD-10-CM

## 2018-10-18 DIAGNOSIS — R35.89 POLYURIA: ICD-10-CM

## 2018-10-18 DIAGNOSIS — R53.82 CHRONIC FATIGUE: ICD-10-CM

## 2018-10-18 LAB
A/G RATIO: 1.8 (ref 1.1–2.2)
ALBUMIN SERPL-MCNC: 4.6 G/DL (ref 3.4–5)
ALP BLD-CCNC: 65 U/L (ref 40–129)
ALT SERPL-CCNC: 33 U/L (ref 10–40)
ANION GAP SERPL CALCULATED.3IONS-SCNC: 16 MMOL/L (ref 3–16)
AST SERPL-CCNC: 20 U/L (ref 15–37)
BASOPHILS ABSOLUTE: 0 K/UL (ref 0–0.2)
BASOPHILS RELATIVE PERCENT: 0.6 %
BILIRUB SERPL-MCNC: 0.4 MG/DL (ref 0–1)
BILIRUBIN, POC: NEGATIVE
BLOOD URINE, POC: NEGATIVE
BUN BLDV-MCNC: 11 MG/DL (ref 7–20)
CALCIUM SERPL-MCNC: 9.3 MG/DL (ref 8.3–10.6)
CHLORIDE BLD-SCNC: 100 MMOL/L (ref 99–110)
CLARITY, POC: ABNORMAL
CO2: 24 MMOL/L (ref 21–32)
COLOR, POC: ABNORMAL
CREAT SERPL-MCNC: 0.7 MG/DL (ref 0.6–1.1)
EOSINOPHILS ABSOLUTE: 0 K/UL (ref 0–0.6)
EOSINOPHILS RELATIVE PERCENT: 0.5 %
FOLATE: 11.21 NG/ML (ref 4.78–24.2)
GFR AFRICAN AMERICAN: >60
GFR NON-AFRICAN AMERICAN: >60
GLOBULIN: 2.5 G/DL
GLUCOSE BLD-MCNC: 100 MG/DL (ref 70–99)
GLUCOSE URINE, POC: NEGATIVE
HCT VFR BLD CALC: 40 % (ref 36–48)
HEMOGLOBIN: 13.1 G/DL (ref 12–16)
KETONES, POC: NEGATIVE
LEUKOCYTE EST, POC: ABNORMAL
LYMPHOCYTES ABSOLUTE: 1.1 K/UL (ref 1–5.1)
LYMPHOCYTES RELATIVE PERCENT: 22.7 %
MCH RBC QN AUTO: 30.9 PG (ref 26–34)
MCHC RBC AUTO-ENTMCNC: 32.8 G/DL (ref 31–36)
MCV RBC AUTO: 94 FL (ref 80–100)
MONOCYTES ABSOLUTE: 0.4 K/UL (ref 0–1.3)
MONOCYTES RELATIVE PERCENT: 7.4 %
NEUTROPHILS ABSOLUTE: 3.3 K/UL (ref 1.7–7.7)
NEUTROPHILS RELATIVE PERCENT: 68.8 %
NITRITE, POC: NEGATIVE
PDW BLD-RTO: 13.3 % (ref 12.4–15.4)
PH, POC: 5.5
PLATELET # BLD: 276 K/UL (ref 135–450)
PMV BLD AUTO: 8.7 FL (ref 5–10.5)
POTASSIUM SERPL-SCNC: 4.1 MMOL/L (ref 3.5–5.1)
PROTEIN, POC: NEGATIVE
RBC # BLD: 4.25 M/UL (ref 4–5.2)
SODIUM BLD-SCNC: 140 MMOL/L (ref 136–145)
SPECIFIC GRAVITY, POC: 1
TOTAL PROTEIN: 7.1 G/DL (ref 6.4–8.2)
TSH REFLEX: 2.29 UIU/ML (ref 0.27–4.2)
UROBILINOGEN, POC: ABNORMAL
VITAMIN B-12: 253 PG/ML (ref 211–911)
WBC # BLD: 4.8 K/UL (ref 4–11)

## 2018-10-18 PROCEDURE — 81002 URINALYSIS NONAUTO W/O SCOPE: CPT | Performed by: NURSE PRACTITIONER

## 2018-10-18 PROCEDURE — 99213 OFFICE O/P EST LOW 20 MIN: CPT | Performed by: NURSE PRACTITIONER

## 2018-10-18 PROCEDURE — 36415 COLL VENOUS BLD VENIPUNCTURE: CPT | Performed by: NURSE PRACTITIONER

## 2018-10-18 PROCEDURE — 81025 URINE PREGNANCY TEST: CPT | Performed by: NURSE PRACTITIONER

## 2018-10-18 ASSESSMENT — ENCOUNTER SYMPTOMS
WHEEZING: 0
COUGH: 0
SORE THROAT: 0
SHORTNESS OF BREATH: 0
EYES NEGATIVE: 1
CHEST TIGHTNESS: 0

## 2018-10-18 NOTE — PATIENT INSTRUCTIONS
We are committed to providing you the best care possible. If you receive a survey after visiting one of our offices, please take time to share your experience concerning your physician office visit. These surveys are confidential and no health information about you is shared. We are eager to improve for you and we are counting on your feedback to help make that happen. Patient Education        Fatigue: Care Instructions  Your Care Instructions    Fatigue is a feeling of tiredness, exhaustion, or lack of energy. You may feel fatigue because of too much or not enough activity. It can also come from stress, lack of sleep, boredom, and poor diet. Many medical problems, such as viral infections, can cause fatigue. Emotional problems, especially depression, are often the cause of fatigue. Fatigue is most often a symptom of another problem. Treatment for fatigue depends on the cause. For example, if you have fatigue because you have a certain health problem, treating this problem also treats your fatigue. If depression or anxiety is the cause, treatment may help. Follow-up care is a key part of your treatment and safety. Be sure to make and go to all appointments, and call your doctor if you are having problems. It's also a good idea to know your test results and keep a list of the medicines you take. How can you care for yourself at home? · Get regular exercise. But don't overdo it. Go back and forth between rest and exercise. · Get plenty of rest.  · Eat a healthy diet. Do not skip meals, especially breakfast.  · Reduce your use of caffeine, tobacco, and alcohol. Caffeine is most often found in coffee, tea, cola drinks, and chocolate. · Limit medicines that can cause fatigue. This includes tranquilizers and cold and allergy medicines. When should you call for help?   Watch closely for changes in your health, and be sure to contact your doctor if:    · You have new symptoms such as fever or a rash.

## 2018-10-18 NOTE — PROGRESS NOTES
clinically significant.  History of cardiac monitoring 12/01/2017    30 day event monitoring     History of echocardiogram 12/19/2017    Normal left ventricle structure and function. Ejection fraction is visually estimated at >60%. No significant valvular disease noted. No evidence of pericardial effusion. Essentially normal echo.  History of EKG 9/6/2011    Sinus arrhythmia. Tightward axis. Diffuse nonspecific T wave abnormalities.  History of exercise stress test 12/19/2017    treadmill    Palpitations     Palpitations     Postpartum depression     Sinus arrhythmia     Thyroid disease     Underactive thyroid       Current Outpatient Prescriptions on File Prior to Visit   Medication Sig Dispense Refill    levothyroxine (SYNTHROID) 125 MCG tablet TAKE ONE TABLET BY MOUTH ONCE DAILY 30 tablet 5    ranitidine (ZANTAC) 300 MG tablet TAKE ONE TABLET BY MOUTH NIGHTLY (Patient taking differently: Take 300 mg by mouth as needed TAKE ONE TABLET BY MOUTH NIGHTLY) 30 tablet 3    aspirin-acetaminophen-caffeine (EXCEDRIN MIGRAINE) 250-250-65 MG per tablet Take 1 tablet by mouth every 6 hours as needed for Headaches       No current facility-administered medications on file prior to visit. Past Medical, Family, and Social History have been reviewed today. Review of Systems   Constitutional: Positive for activity change and fatigue. Negative for appetite change, chills, diaphoresis and fever. HENT: Negative for sore throat. Eyes: Negative. Respiratory: Negative for cough, chest tightness, shortness of breath and wheezing. Cardiovascular: Negative for chest pain and palpitations. Endocrine: Positive for polyuria. Negative for polydipsia and polyphagia. Genitourinary: Positive for flank pain and frequency. Negative for dysuria and urgency. Neurological: Negative for dizziness, light-headedness and headaches. Hematological: Negative for adenopathy.    Psychiatric/Behavioral: Positive for sleep disturbance. The patient is not nervous/anxious. OBJECTIVE:     /80   Pulse 73   Temp 97.8 °F (36.6 °C) (Oral)   Ht 5' 3\" (1.6 m)   Wt 204 lb 6.4 oz (92.7 kg)   LMP 09/10/2018 (Exact Date)   SpO2 99%   Breastfeeding? No   BMI 36.21 kg/m²     Physical Exam   Constitutional: She is oriented to person, place, and time. She appears well-developed and well-nourished. HENT:   Head: Normocephalic. Cardiovascular: Normal rate, regular rhythm and normal heart sounds. Pulmonary/Chest: Effort normal and breath sounds normal.   Abdominal: Normal appearance. There is CVA tenderness (right). Lymphadenopathy:     She has no cervical adenopathy. Neurological: She is alert and oriented to person, place, and time. Skin: Skin is warm and dry. Psychiatric: She has a normal mood and affect. Her behavior is normal. Judgment and thought content normal.       No results found for requested labs within last 30 days.      Microscopic Examination (no units)   Date Value   03/13/2017 YES       Lab Results   Component Value Date    WBC 7.9 07/10/2018    WBC 6.9 07/10/2018    WBC 4.5 11/26/2017    HGB 12.5 07/10/2018    HGB 13.0 07/10/2018    HGB 13.2 11/26/2017    HCT 38.0 07/10/2018    HCT 39.4 07/10/2018    HCT 39.6 11/26/2017    MCV 95.7 07/10/2018    MCV 94.9 07/10/2018    MCV 91.0 11/26/2017     07/10/2018     07/10/2018     11/26/2017    SEGSABS 5.9 07/10/2018    SEGSABS 2.5 11/26/2017    SEGSABS 4.9 07/06/2017    LYMPHSABS 1.3 07/10/2018    MONOSABS 0.6 07/10/2018    EOSABS 0.0 07/10/2018    BASOSABS 0.0 07/10/2018     Lab Results   Component Value Date    TSH 2.09 04/12/2018    TSHHS 1.650 11/26/2017     Lab Results   Component Value Date    LABALBU 4.1 07/10/2018    BILITOT 0.4 07/10/2018    AST 17 07/10/2018    ALT 24 07/10/2018    ALKPHOS 59 07/10/2018             Results for orders placed or performed in visit on 10/18/18   POCT Urinalysis no Micro   Result Value Ref

## 2018-10-19 LAB
ESTIMATED AVERAGE GLUCOSE: 105.4 MG/DL
HBA1C MFR BLD: 5.3 %

## 2018-10-20 LAB
EPSTEIN BARR VIRUS NUCLEAR AB IGG: 277 U/ML (ref 0–21.9)
EPSTEIN-BARR EARLY ANTIGEN ANTIBODY: <5 U/ML (ref 0–10.9)
EPSTEIN-BARR VCA IGG: >750 U/ML (ref 0–21.9)
EPSTEIN-BARR VCA IGM: <10 U/ML (ref 0–43.9)
URINE CULTURE, ROUTINE: NORMAL
VITAMIN D 1,25-DIHYDROXY: 55.5 PG/ML (ref 19.9–79.3)

## 2018-11-06 ENCOUNTER — OFFICE VISIT (OUTPATIENT)
Dept: INTERNAL MEDICINE CLINIC | Age: 28
End: 2018-11-06
Payer: COMMERCIAL

## 2018-11-06 VITALS
OXYGEN SATURATION: 98 % | SYSTOLIC BLOOD PRESSURE: 110 MMHG | HEART RATE: 64 BPM | WEIGHT: 205.2 LBS | DIASTOLIC BLOOD PRESSURE: 74 MMHG | BODY MASS INDEX: 36.35 KG/M2

## 2018-11-06 DIAGNOSIS — N30.10 INTERSTITIAL CYSTITIS: Chronic | ICD-10-CM

## 2018-11-06 DIAGNOSIS — K21.9 GASTROESOPHAGEAL REFLUX DISEASE WITHOUT ESOPHAGITIS: ICD-10-CM

## 2018-11-06 DIAGNOSIS — Z00.00 GENERAL MEDICAL EXAM: Primary | ICD-10-CM

## 2018-11-06 PROCEDURE — 99213 OFFICE O/P EST LOW 20 MIN: CPT | Performed by: NURSE PRACTITIONER

## 2018-11-06 ASSESSMENT — ENCOUNTER SYMPTOMS
VOMITING: 0
ABDOMINAL PAIN: 0
DIARRHEA: 0
APNEA: 0
CHEST TIGHTNESS: 0
NAUSEA: 0
COLOR CHANGE: 0
SINUS PAIN: 0
SHORTNESS OF BREATH: 0
COUGH: 0

## 2018-11-06 NOTE — PROGRESS NOTES
exudate. Eyes: Pupils are equal, round, and reactive to light. Conjunctivae are normal.   Neck: Normal range of motion. Neck supple. No muscular tenderness present. No edema and no erythema present. Cardiovascular: Normal rate, regular rhythm and normal heart sounds. No murmur heard. Pulmonary/Chest: Effort normal and breath sounds normal. No respiratory distress. Abdominal: Soft. Bowel sounds are normal. There is no tenderness. Musculoskeletal: Normal range of motion. She exhibits no edema. Neurological: She is alert and oriented to person, place, and time. No cranial nerve deficit. Skin: Skin is warm and dry. Capillary refill takes less than 2 seconds. No rash noted. She is not diaphoretic. Psychiatric: She has a normal mood and affect. Her behavior is normal. Thought content normal.       ASSESSMENT:    1. General medical exam    2. Gastroesophageal reflux disease without esophagitis    3. Interstitial cystitis        PLAN:    Wander Ray was seen today for 6 month follow-up. Diagnoses and all orders for this visit:    General medical exam   - all labs on 10/18/18 WNL, will obtain labs and next visit. Gastroesophageal reflux disease without esophagitis   -continue healthy diet and weight loss. Eat small frequent meals as opposed to large meals and remain upright for at least 30 minutes after meals. Interstitial cystitis   -stable. Encourage to increase water intake and empty bladder  Frequently and completely. Course of treatment, including any medications, possible imaging, referrals, and follow ups discussed with patient. All risks and benefits and possible side effects discussed with patient who agrees to plan of care and verbalizes understanding. All labs and imaging reviewed. No flowsheet data found. Return in about 6 months (around 5/6/2019).

## 2018-12-18 ENCOUNTER — TELEPHONE (OUTPATIENT)
Dept: INTERNAL MEDICINE CLINIC | Age: 28
End: 2018-12-18

## 2018-12-18 DIAGNOSIS — E07.9 THYROID DISEASE: ICD-10-CM

## 2018-12-18 RX ORDER — LEVOTHYROXINE SODIUM 0.12 MG/1
TABLET ORAL
Qty: 30 TABLET | Refills: 5 | Status: SHIPPED | OUTPATIENT
Start: 2018-12-18 | End: 2019-05-02 | Stop reason: SDUPTHER

## 2018-12-18 RX ORDER — LEVOTHYROXINE SODIUM 0.12 MG/1
TABLET ORAL
Qty: 30 TABLET | Refills: 5 | Status: SHIPPED | OUTPATIENT
Start: 2018-12-18 | End: 2018-12-18 | Stop reason: SDUPTHER

## 2018-12-27 ENCOUNTER — HOSPITAL ENCOUNTER (EMERGENCY)
Age: 28
Discharge: HOME | End: 2018-12-27
Payer: COMMERCIAL

## 2018-12-27 VITALS
WEIGHT: 205 LBS | HEART RATE: 95 BPM | BODY MASS INDEX: 37.73 KG/M2 | TEMPERATURE: 97.7 F | OXYGEN SATURATION: 99 % | RESPIRATION RATE: 16 BRPM | SYSTOLIC BLOOD PRESSURE: 136 MMHG | HEIGHT: 62 IN | DIASTOLIC BLOOD PRESSURE: 84 MMHG

## 2018-12-27 DIAGNOSIS — H92.03 OTALGIA OF BOTH EARS: ICD-10-CM

## 2018-12-27 DIAGNOSIS — J02.9 ACUTE PHARYNGITIS, UNSPECIFIED ETIOLOGY: Primary | ICD-10-CM

## 2018-12-27 PROCEDURE — 6370000000 HC RX 637 (ALT 250 FOR IP): Performed by: PHYSICIAN ASSISTANT

## 2018-12-27 PROCEDURE — 99282 EMERGENCY DEPT VISIT SF MDM: CPT

## 2018-12-27 RX ORDER — TRAMADOL HYDROCHLORIDE 50 MG/1
50 TABLET ORAL ONCE
Status: COMPLETED | OUTPATIENT
Start: 2018-12-27 | End: 2018-12-27

## 2018-12-27 RX ORDER — CIPROFLOXACIN HYDROCHLORIDE 3.5 MG/ML
1 SOLUTION/ DROPS TOPICAL
Status: DISCONTINUED | OUTPATIENT
Start: 2018-12-27 | End: 2018-12-27

## 2018-12-27 RX ORDER — DEXAMETHASONE SODIUM PHOSPHATE 1 MG/ML
2 SOLUTION/ DROPS OPHTHALMIC ONCE
Status: COMPLETED | OUTPATIENT
Start: 2018-12-27 | End: 2018-12-27

## 2018-12-27 RX ORDER — CIPROFLOXACIN HYDROCHLORIDE 3.5 MG/ML
1 SOLUTION/ DROPS TOPICAL ONCE
Status: COMPLETED | OUTPATIENT
Start: 2018-12-27 | End: 2018-12-27

## 2018-12-27 RX ORDER — IBUPROFEN 800 MG/1
800 TABLET ORAL ONCE
Status: COMPLETED | OUTPATIENT
Start: 2018-12-27 | End: 2018-12-27

## 2018-12-27 RX ADMIN — CIPROFLOXACIN HYDROCHLORIDE 1 DROP: 3 SOLUTION/ DROPS OPHTHALMIC at 03:43

## 2018-12-27 RX ADMIN — IBUPROFEN 800 MG: 800 TABLET ORAL at 03:43

## 2018-12-27 RX ADMIN — DEXAMETHASONE SODIUM PHOSPHATE 2 DROP: 1 SOLUTION/ DROPS OPHTHALMIC at 03:43

## 2018-12-27 RX ADMIN — TRAMADOL HYDROCHLORIDE 50 MG: 50 TABLET, FILM COATED ORAL at 03:43

## 2018-12-27 ASSESSMENT — PAIN DESCRIPTION - ORIENTATION: ORIENTATION: RIGHT;LEFT

## 2018-12-27 ASSESSMENT — PAIN SCALES - GENERAL
PAINLEVEL_OUTOF10: 10
PAINLEVEL_OUTOF10: 10

## 2018-12-27 ASSESSMENT — PAIN DESCRIPTION - DESCRIPTORS: DESCRIPTORS: ACHING

## 2018-12-27 ASSESSMENT — PAIN DESCRIPTION - LOCATION: LOCATION: EAR;THROAT

## 2018-12-27 ASSESSMENT — PAIN DESCRIPTION - PAIN TYPE: TYPE: ACUTE PAIN

## 2018-12-27 NOTE — ED PROVIDER NOTES
ophthalmic solution 2 drop  2 drop Both Ears Once [employer], PA-C        ibuprofen (ADVIL;MOTRIN) tablet 800 mg  800 mg Oral Once [employer], LIAN        traMADol Wylie Pool) tablet 50 mg  50 mg Oral Once [employer], LIAN         Current Outpatient Prescriptions   Medication Sig Dispense Refill    levothyroxine (SYNTHROID) 125 MCG tablet TAKE ONE TABLET BY MOUTH ONCE DAILY 30 tablet 5    Acetaminophen-Caff-Pyrilamine (MIDOL COMPLETE PO) Take 2 tablets by mouth as needed      ranitidine (ZANTAC) 300 MG tablet TAKE ONE TABLET BY MOUTH NIGHTLY (Patient taking differently: Take 300 mg by mouth as needed TAKE ONE TABLET BY MOUTH NIGHTLY) 30 tablet 3    aspirin-acetaminophen-caffeine (EXCEDRIN MIGRAINE) 250-250-65 MG per tablet Take 1 tablet by mouth every 6 hours as needed for Headaches       Allergies   Allergen Reactions    Amoxicillin Hives    Pcn [Penicillins] Hives    Sulfa Antibiotics Other (See Comments)     Face turns red, itches and burns     Benadryl [Diphenhydramine Hcl] Nausea And Vomiting       Nursing Notes Reviewed    Physical Exam:  ED Triage Vitals [12/27/18 0226]   Enc Vitals Group      /84      Pulse 95      Resp 16      Temp 97.7 °F (36.5 °C)      Temp Source Oral      SpO2 99 %      Weight 205 lb (93 kg)      Height 5' 2\" (1.575 m)      Head Circumference       Peak Flow       Pain Score       Pain Loc       Pain Edu? Excl. in [address]? General :Patient is awake alert oriented person place and time no acute distress nontoxic appearing  HEENT: Pupils are equally round and reactive to light extraocular motors are intact conjunctivae clear sclerae white there is no injection no icterus. Nose without any rhinorrhea or epistaxis. Oral mucosa is moist no exudate buccal mucosa shows no ulcerations. Uvula is midline  Posterior pharynx non injected. bilat middle ear effusion, no erythema. The L tm appears to have a rupture. No tragal ttp. No mastoid ttp.

## 2018-12-28 ENCOUNTER — OFFICE VISIT (OUTPATIENT)
Dept: INTERNAL MEDICINE CLINIC | Age: 28
End: 2018-12-28
Payer: COMMERCIAL

## 2018-12-28 VITALS
SYSTOLIC BLOOD PRESSURE: 132 MMHG | RESPIRATION RATE: 18 BRPM | OXYGEN SATURATION: 97 % | DIASTOLIC BLOOD PRESSURE: 91 MMHG | HEART RATE: 88 BPM | WEIGHT: 205.6 LBS | BODY MASS INDEX: 37.6 KG/M2

## 2018-12-28 DIAGNOSIS — H72.92 OTITIS MEDIA, SEROUS, TM RUPTURE, LEFT: Primary | ICD-10-CM

## 2018-12-28 DIAGNOSIS — H65.92 OTITIS MEDIA, SEROUS, TM RUPTURE, LEFT: Primary | ICD-10-CM

## 2018-12-28 DIAGNOSIS — J02.9 PHARYNGITIS, UNSPECIFIED ETIOLOGY: ICD-10-CM

## 2018-12-28 DIAGNOSIS — H60.501 ACUTE OTITIS EXTERNA OF RIGHT EAR, UNSPECIFIED TYPE: ICD-10-CM

## 2018-12-28 PROCEDURE — 4130F TOPICAL PREP RX AOE: CPT | Performed by: NURSE PRACTITIONER

## 2018-12-28 PROCEDURE — 1036F TOBACCO NON-USER: CPT | Performed by: NURSE PRACTITIONER

## 2018-12-28 PROCEDURE — G8427 DOCREV CUR MEDS BY ELIG CLIN: HCPCS | Performed by: NURSE PRACTITIONER

## 2018-12-28 PROCEDURE — G8417 CALC BMI ABV UP PARAM F/U: HCPCS | Performed by: NURSE PRACTITIONER

## 2018-12-28 PROCEDURE — G8484 FLU IMMUNIZE NO ADMIN: HCPCS | Performed by: NURSE PRACTITIONER

## 2018-12-28 PROCEDURE — 99213 OFFICE O/P EST LOW 20 MIN: CPT | Performed by: NURSE PRACTITIONER

## 2018-12-28 RX ORDER — PREDNISONE 10 MG/1
TABLET ORAL
Qty: 20 TABLET | Refills: 0 | Status: SHIPPED | OUTPATIENT
Start: 2018-12-28 | End: 2019-05-02 | Stop reason: ALTCHOICE

## 2018-12-28 RX ORDER — OFLOXACIN 3 MG/ML
5 SOLUTION AURICULAR (OTIC) 3 TIMES DAILY
Qty: 10 ML | Refills: 0 | Status: SHIPPED | OUTPATIENT
Start: 2018-12-28 | End: 2019-01-07

## 2018-12-28 RX ORDER — DOXYCYCLINE HYCLATE 100 MG
100 TABLET ORAL 2 TIMES DAILY
Qty: 14 TABLET | Refills: 0 | Status: SHIPPED | OUTPATIENT
Start: 2018-12-28 | End: 2019-01-04

## 2018-12-28 ASSESSMENT — ENCOUNTER SYMPTOMS
CHEST TIGHTNESS: 0
COLOR CHANGE: 0
SINUS PAIN: 1
DIARRHEA: 0
APNEA: 0
ALLERGIC/IMMUNOLOGIC NEGATIVE: 1
VOMITING: 0
SINUS PRESSURE: 1
NAUSEA: 0
FACIAL SWELLING: 0
EYES NEGATIVE: 1
COUGH: 0
SHORTNESS OF BREATH: 0
ABDOMINAL PAIN: 0

## 2018-12-28 NOTE — PROGRESS NOTES
[place] Client  1990 12/28/18    Chief Complaint   Patient presents with    Facial Swelling    Pharyngitis    Otalgia     difficulty hearing        SUBJECTIVE:      Patient presents to the office today for ED follow up. Was seen at [facility] ED for c/o left sided otalgia and pharyngitis yesterday after returning from a vacation to [facility] to [employer] States that the pharyngitis has gradually improved, however, her pain started during the trip and immediately worsened after departing the plane coming home. At the ED yesterday she was found to have Left TM rupture and was given Cipro otic drops in the ED, as well as provided with ENT referral. Since then she states her left ear pain has very mildly improved, however, now her right ear hurts as well. Review of Systems   Constitutional: Negative for activity change, appetite change, fatigue and fever. HENT: Positive for congestion, ear pain, hearing loss (mild, left ear), postnasal drip, sinus pain and sinus pressure. Negative for ear discharge, facial swelling and nosebleeds. Eyes: Negative. Respiratory: Negative for apnea, cough, chest tightness and shortness of breath. Cardiovascular: Negative for chest pain and palpitations. Gastrointestinal: Negative for abdominal pain, diarrhea, nausea and vomiting. Endocrine: Negative. Genitourinary: Negative. Musculoskeletal: Negative. Skin: Negative for color change and rash. Allergic/Immunologic: Negative. Neurological: Negative for dizziness, facial asymmetry, light-headedness, numbness and headaches. Psychiatric/Behavioral: Negative. OBJECTIVE:    BP (!) 132/91   Pulse 88   Resp 18   Wt 205 lb 9.6 oz (93.3 kg)   LMP 12/16/2018   SpO2 97%   Breastfeeding? No   BMI 37.60 kg/m²     Physical Exam   Constitutional: She is oriented to person, place, and time. She appears well-developed and well-nourished. No distress. HENT:   Head: Normocephalic and atraumatic.    Right

## 2019-01-04 ENCOUNTER — OFFICE VISIT (OUTPATIENT)
Dept: INTERNAL MEDICINE CLINIC | Age: 29
End: 2019-01-04
Payer: COMMERCIAL

## 2019-01-04 VITALS
WEIGHT: 202 LBS | BODY MASS INDEX: 37.17 KG/M2 | SYSTOLIC BLOOD PRESSURE: 120 MMHG | HEART RATE: 71 BPM | OXYGEN SATURATION: 98 % | DIASTOLIC BLOOD PRESSURE: 80 MMHG | HEIGHT: 62 IN

## 2019-01-04 DIAGNOSIS — H65.92 OTITIS MEDIA, SEROUS, TM RUPTURE, LEFT: Primary | ICD-10-CM

## 2019-01-04 DIAGNOSIS — H92.01 OTALGIA, RIGHT: ICD-10-CM

## 2019-01-04 DIAGNOSIS — H72.92 OTITIS MEDIA, SEROUS, TM RUPTURE, LEFT: Primary | ICD-10-CM

## 2019-01-04 PROCEDURE — 1036F TOBACCO NON-USER: CPT | Performed by: NURSE PRACTITIONER

## 2019-01-04 PROCEDURE — 99212 OFFICE O/P EST SF 10 MIN: CPT | Performed by: NURSE PRACTITIONER

## 2019-01-04 PROCEDURE — G8417 CALC BMI ABV UP PARAM F/U: HCPCS | Performed by: NURSE PRACTITIONER

## 2019-01-04 PROCEDURE — G8427 DOCREV CUR MEDS BY ELIG CLIN: HCPCS | Performed by: NURSE PRACTITIONER

## 2019-01-04 PROCEDURE — G8484 FLU IMMUNIZE NO ADMIN: HCPCS | Performed by: NURSE PRACTITIONER

## 2019-01-04 ASSESSMENT — ENCOUNTER SYMPTOMS
EYES NEGATIVE: 1
COLOR CHANGE: 0
CHEST TIGHTNESS: 0
SHORTNESS OF BREATH: 0
SINUS PAIN: 0
GASTROINTESTINAL NEGATIVE: 1
APNEA: 0
ALLERGIC/IMMUNOLOGIC NEGATIVE: 1
SINUS PRESSURE: 0
SORE THROAT: 0
COUGH: 0

## 2019-05-02 ENCOUNTER — HOSPITAL ENCOUNTER (OUTPATIENT)
Age: 29
Discharge: HOME | End: 2019-05-02
Payer: COMMERCIAL

## 2019-05-02 ENCOUNTER — OFFICE VISIT (OUTPATIENT)
Dept: INTERNAL MEDICINE CLINIC | Age: 29
End: 2019-05-02
Payer: COMMERCIAL

## 2019-05-02 VITALS
HEART RATE: 52 BPM | SYSTOLIC BLOOD PRESSURE: 114 MMHG | WEIGHT: 203.6 LBS | DIASTOLIC BLOOD PRESSURE: 84 MMHG | RESPIRATION RATE: 18 BRPM | OXYGEN SATURATION: 98 % | BODY MASS INDEX: 37.23 KG/M2

## 2019-05-02 DIAGNOSIS — Z00.00 GENERAL MEDICAL EXAM: Primary | ICD-10-CM

## 2019-05-02 DIAGNOSIS — K21.9 GASTROESOPHAGEAL REFLUX DISEASE WITHOUT ESOPHAGITIS: ICD-10-CM

## 2019-05-02 DIAGNOSIS — E07.9 THYROID DISEASE: ICD-10-CM

## 2019-05-02 LAB
ALBUMIN SERPL-MCNC: 4.3 GM/DL (ref 3.4–5)
ALP BLD-CCNC: 63 IU/L (ref 40–128)
ALT SERPL-CCNC: 31 U/L (ref 10–40)
ANION GAP SERPL CALCULATED.3IONS-SCNC: 14 MMOL/L (ref 4–16)
AST SERPL-CCNC: 21 IU/L (ref 15–37)
BASOPHILS ABSOLUTE: 0 K/CU MM
BASOPHILS RELATIVE PERCENT: 1 % (ref 0–1)
BILIRUB SERPL-MCNC: 0.4 MG/DL (ref 0–1)
BUN BLDV-MCNC: 10 MG/DL (ref 6–23)
CALCIUM SERPL-MCNC: 9.2 MG/DL (ref 8.3–10.6)
CHLORIDE BLD-SCNC: 102 MMOL/L (ref 99–110)
CHOLESTEROL: 171 MG/DL
CO2: 24 MMOL/L (ref 21–32)
CREAT SERPL-MCNC: 0.7 MG/DL (ref 0.6–1.1)
DIFFERENTIAL TYPE: ABNORMAL
EOSINOPHILS ABSOLUTE: 0.1 K/CU MM
EOSINOPHILS RELATIVE PERCENT: 1.5 % (ref 0–3)
GFR AFRICAN AMERICAN: >60 ML/MIN/1.73M2
GFR NON-AFRICAN AMERICAN: >60 ML/MIN/1.73M2
GLUCOSE FASTING: 70 MG/DL (ref 70–99)
HCT VFR BLD CALC: 40.4 % (ref 37–47)
HDLC SERPL-MCNC: 62 MG/DL
HEMOGLOBIN: 12.6 GM/DL (ref 12.5–16)
IMMATURE NEUTROPHIL %: 0.3 % (ref 0–0.43)
LDL CHOLESTEROL DIRECT: 120 MG/DL
LYMPHOCYTES ABSOLUTE: 1.4 K/CU MM
LYMPHOCYTES RELATIVE PERCENT: 35 % (ref 24–44)
MCH RBC QN AUTO: 30.3 PG (ref 27–31)
MCHC RBC AUTO-ENTMCNC: 31.2 % (ref 32–36)
MCV RBC AUTO: 97.1 FL (ref 78–100)
MONOCYTES ABSOLUTE: 0.3 K/CU MM
MONOCYTES RELATIVE PERCENT: 8.6 % (ref 0–4)
NUCLEATED RBC %: 0 %
PDW BLD-RTO: 12.2 % (ref 11.7–14.9)
PLATELET # BLD: 277 K/CU MM (ref 140–440)
PMV BLD AUTO: 10.1 FL (ref 7.5–11.1)
POTASSIUM SERPL-SCNC: 4.2 MMOL/L (ref 3.5–5.1)
RBC # BLD: 4.16 M/CU MM (ref 4.2–5.4)
SEGMENTED NEUTROPHILS ABSOLUTE COUNT: 2.1 K/CU MM
SEGMENTED NEUTROPHILS RELATIVE PERCENT: 53.6 % (ref 36–66)
SODIUM BLD-SCNC: 140 MMOL/L (ref 135–145)
TOTAL IMMATURE NEUTOROPHIL: 0.01 K/CU MM
TOTAL NUCLEATED RBC: 0 K/CU MM
TOTAL PROTEIN: 6.8 GM/DL (ref 6.4–8.2)
TRIGL SERPL-MCNC: 62 MG/DL
TSH HIGH SENSITIVITY: 1.85 UIU/ML (ref 0.27–4.2)
WBC # BLD: 3.9 K/CU MM (ref 4–10.5)

## 2019-05-02 PROCEDURE — 83721 ASSAY OF BLOOD LIPOPROTEIN: CPT

## 2019-05-02 PROCEDURE — 99213 OFFICE O/P EST LOW 20 MIN: CPT | Performed by: NURSE PRACTITIONER

## 2019-05-02 PROCEDURE — 84443 ASSAY THYROID STIM HORMONE: CPT

## 2019-05-02 PROCEDURE — G8417 CALC BMI ABV UP PARAM F/U: HCPCS | Performed by: NURSE PRACTITIONER

## 2019-05-02 PROCEDURE — 80053 COMPREHEN METABOLIC PANEL: CPT

## 2019-05-02 PROCEDURE — 1036F TOBACCO NON-USER: CPT | Performed by: NURSE PRACTITIONER

## 2019-05-02 PROCEDURE — 85025 COMPLETE CBC W/AUTO DIFF WBC: CPT

## 2019-05-02 PROCEDURE — 36415 COLL VENOUS BLD VENIPUNCTURE: CPT

## 2019-05-02 PROCEDURE — 80061 LIPID PANEL: CPT

## 2019-05-02 PROCEDURE — G8427 DOCREV CUR MEDS BY ELIG CLIN: HCPCS | Performed by: NURSE PRACTITIONER

## 2019-05-02 RX ORDER — LEVOTHYROXINE SODIUM 0.12 MG/1
TABLET ORAL
Qty: 30 TABLET | Refills: 5 | Status: SHIPPED | OUTPATIENT
Start: 2019-05-02 | End: 2019-12-16 | Stop reason: SDUPTHER

## 2019-05-02 ASSESSMENT — PATIENT HEALTH QUESTIONNAIRE - PHQ9
2. FEELING DOWN, DEPRESSED OR HOPELESS: 0
SUM OF ALL RESPONSES TO PHQ QUESTIONS 1-9: 1
SUM OF ALL RESPONSES TO PHQ9 QUESTIONS 1 & 2: 1
SUM OF ALL RESPONSES TO PHQ QUESTIONS 1-9: 1
1. LITTLE INTEREST OR PLEASURE IN DOING THINGS: 1

## 2019-05-02 ASSESSMENT — ENCOUNTER SYMPTOMS
COLOR CHANGE: 0
SHORTNESS OF BREATH: 0
VOMITING: 0
CHEST TIGHTNESS: 0
NAUSEA: 0
ABDOMINAL PAIN: 0
ALLERGIC/IMMUNOLOGIC NEGATIVE: 1
APNEA: 0
SINUS PAIN: 0
DIARRHEA: 0
COUGH: 0
EYES NEGATIVE: 1
SINUS PRESSURE: 0

## 2019-05-02 NOTE — PROGRESS NOTES
Maria Isabel Galarza  1990 05/02/19    Chief Complaint   Patient presents with    Medication Refill     6 month follow up        SUBJECTIVE:      Patient presents to the office this morning for routine 6 month follow up:    Patient continues to be compliant with synthroid regiment for her hypothyroidism. Denies c/o fatigue, weight gain or loss, heat or cold intolerance, diarrhea, or other GI symptoms. Lab Results   Component Value Date    TSH 2.09 04/12/2018     Patient's reflux well controlled on current medications. Patient denies any blood in stool black stool, heartburn, reflux. Denies issues with frequent coughing or reflux while sleeping. Able to eat and drink appropriately without complications. She is rarely needed her Zantac and only takes it PRN. Health maintenance:  -last PAP was 2018 with Dr Garen Duane, not due until 2021 (will request records)    Review of Systems   Constitutional: Negative for activity change, appetite change, fatigue and fever. HENT: Negative for congestion, nosebleeds, sinus pressure and sinus pain. Eyes: Negative. Respiratory: Negative for apnea, cough, chest tightness and shortness of breath. Cardiovascular: Negative for chest pain and palpitations. Gastrointestinal: Negative for abdominal pain, diarrhea, nausea and vomiting. Endocrine: Negative. Genitourinary: Negative for difficulty urinating, flank pain and hematuria. Musculoskeletal: Negative for arthralgias, joint swelling and myalgias. Skin: Negative for color change and rash. Allergic/Immunologic: Negative. Neurological: Negative for dizziness, light-headedness and headaches. Psychiatric/Behavioral: Negative. Negative for behavioral problems. OBJECTIVE:    /84   Pulse 52   Resp 18   Wt 203 lb 9.6 oz (92.4 kg)   SpO2 98%   BMI 37.23 kg/m²     Physical Exam   Constitutional: She is oriented to person, place, and time. She appears well-developed and well-nourished.  No distress. HENT:   Head: Normocephalic and atraumatic. Eyes: Pupils are equal, round, and reactive to light. Conjunctivae and EOM are normal.   Neck: Normal range of motion. Neck supple. No muscular tenderness present. No edema and no erythema present. Cardiovascular: Normal rate, regular rhythm and normal heart sounds. Exam reveals no friction rub. No murmur heard. Pulmonary/Chest: Effort normal and breath sounds normal. No respiratory distress. Abdominal: Soft. Bowel sounds are normal. There is no tenderness. Musculoskeletal: Normal range of motion. She exhibits no edema. Neurological: She is alert and oriented to person, place, and time. No cranial nerve deficit. Coordination normal.   Skin: Skin is warm and dry. Capillary refill takes less than 2 seconds. No rash noted. She is not diaphoretic. No erythema. Psychiatric: She has a normal mood and affect. Her behavior is normal. Judgment and thought content normal.       ASSESSMENT:    1. General medical exam    2. Thyroid disease    3. Gastroesophageal reflux disease without esophagitis        PLAN:    Judy Borges was seen today for medication refill. Diagnoses and all orders for this visit:    General medical exam- encouraged routine exercise and healthy diet. Will get labs today. -     CBC Auto Differential; Future  -     Comprehensive Metabolic Panel; Future  -     Lipid, Fasting; Future  -     TSH with Reflex; Future    Thyroid disease- recent levels have been normal, however, will recheck TSH today and adjust dose as necessary.  -     levothyroxine (SYNTHROID) 125 MCG tablet; TAKE ONE TABLET BY MOUTH ONCE DAILY  -     TSH with Reflex; Future    Gastroesophageal reflux disease without esophagitis- stable; requiring very use minimal H2 blocker. Course of treatment, including any medications, possible imaging, referrals, and follow ups discussed with patient.  All risks and benefits and possible side effects discussed with patient who agrees to plan of care and verbalizes understanding. All labs and imaging reviewed. No flowsheet data found. Return in about 6 months (around 11/2/2019).

## 2019-06-24 ENCOUNTER — OFFICE VISIT (OUTPATIENT)
Dept: INTERNAL MEDICINE CLINIC | Age: 29
End: 2019-06-24
Payer: COMMERCIAL

## 2019-06-24 ENCOUNTER — HOSPITAL ENCOUNTER (OUTPATIENT)
Age: 29
Discharge: HOME | End: 2019-06-24
Payer: COMMERCIAL

## 2019-06-24 VITALS
HEART RATE: 62 BPM | WEIGHT: 204 LBS | OXYGEN SATURATION: 98 % | SYSTOLIC BLOOD PRESSURE: 118 MMHG | HEIGHT: 62 IN | DIASTOLIC BLOOD PRESSURE: 80 MMHG | BODY MASS INDEX: 37.54 KG/M2

## 2019-06-24 DIAGNOSIS — R10.13 EPIGASTRIC PAIN: Primary | ICD-10-CM

## 2019-06-24 LAB
ALBUMIN SERPL-MCNC: 4.6 GM/DL (ref 3.4–5)
ALP BLD-CCNC: 62 IU/L (ref 40–128)
ALT SERPL-CCNC: 15 U/L (ref 10–40)
ANION GAP SERPL CALCULATED.3IONS-SCNC: 10 MMOL/L (ref 4–16)
AST SERPL-CCNC: 12 IU/L (ref 15–37)
BASOPHILS ABSOLUTE: 0.1 K/CU MM
BASOPHILS RELATIVE PERCENT: 1.3 % (ref 0–1)
BILIRUB SERPL-MCNC: 0.4 MG/DL (ref 0–1)
BUN BLDV-MCNC: 11 MG/DL (ref 6–23)
CALCIUM SERPL-MCNC: 9.2 MG/DL (ref 8.3–10.6)
CHLORIDE BLD-SCNC: 102 MMOL/L (ref 99–110)
CO2: 26 MMOL/L (ref 21–32)
CREAT SERPL-MCNC: 0.8 MG/DL (ref 0.6–1.1)
DIFFERENTIAL TYPE: ABNORMAL
EOSINOPHILS ABSOLUTE: 0 K/CU MM
EOSINOPHILS RELATIVE PERCENT: 0.5 % (ref 0–3)
GFR AFRICAN AMERICAN: >60 ML/MIN/1.73M2
GFR NON-AFRICAN AMERICAN: >60 ML/MIN/1.73M2
GLUCOSE BLD-MCNC: 74 MG/DL (ref 70–99)
HCT VFR BLD CALC: 39.3 % (ref 37–47)
HEMOGLOBIN: 12.4 GM/DL (ref 12.5–16)
IMMATURE NEUTROPHIL %: 0.3 % (ref 0–0.43)
LIPASE: 19 IU/L (ref 13–60)
LYMPHOCYTES ABSOLUTE: 1.3 K/CU MM
LYMPHOCYTES RELATIVE PERCENT: 33 % (ref 24–44)
MCH RBC QN AUTO: 30.4 PG (ref 27–31)
MCHC RBC AUTO-ENTMCNC: 31.6 % (ref 32–36)
MCV RBC AUTO: 96.3 FL (ref 78–100)
MONOCYTES ABSOLUTE: 0.3 K/CU MM
MONOCYTES RELATIVE PERCENT: 7.7 % (ref 0–4)
NUCLEATED RBC %: 0 %
PDW BLD-RTO: 12.1 % (ref 11.7–14.9)
PLATELET # BLD: 270 K/CU MM (ref 140–440)
PMV BLD AUTO: 10.1 FL (ref 7.5–11.1)
POTASSIUM SERPL-SCNC: 3.9 MMOL/L (ref 3.5–5.1)
RBC # BLD: 4.08 M/CU MM (ref 4.2–5.4)
SEGMENTED NEUTROPHILS ABSOLUTE COUNT: 2.2 K/CU MM
SEGMENTED NEUTROPHILS RELATIVE PERCENT: 57.2 % (ref 36–66)
SODIUM BLD-SCNC: 138 MMOL/L (ref 135–145)
TOTAL IMMATURE NEUTOROPHIL: 0.01 K/CU MM
TOTAL NUCLEATED RBC: 0 K/CU MM
TOTAL PROTEIN: 7.1 GM/DL (ref 6.4–8.2)
WBC # BLD: 3.8 K/CU MM (ref 4–10.5)

## 2019-06-24 PROCEDURE — 99214 OFFICE O/P EST MOD 30 MIN: CPT | Performed by: NURSE PRACTITIONER

## 2019-06-24 PROCEDURE — G8417 CALC BMI ABV UP PARAM F/U: HCPCS | Performed by: NURSE PRACTITIONER

## 2019-06-24 PROCEDURE — 83690 ASSAY OF LIPASE: CPT

## 2019-06-24 PROCEDURE — 36415 COLL VENOUS BLD VENIPUNCTURE: CPT

## 2019-06-24 PROCEDURE — 85025 COMPLETE CBC W/AUTO DIFF WBC: CPT

## 2019-06-24 PROCEDURE — G8427 DOCREV CUR MEDS BY ELIG CLIN: HCPCS | Performed by: NURSE PRACTITIONER

## 2019-06-24 PROCEDURE — 80053 COMPREHEN METABOLIC PANEL: CPT

## 2019-06-24 PROCEDURE — 1036F TOBACCO NON-USER: CPT | Performed by: NURSE PRACTITIONER

## 2019-06-24 RX ORDER — OMEPRAZOLE 20 MG/1
20 CAPSULE, DELAYED RELEASE ORAL 2 TIMES DAILY
Qty: 30 CAPSULE | Refills: 3 | Status: SHIPPED | OUTPATIENT
Start: 2019-06-24 | End: 2019-09-04 | Stop reason: SDUPTHER

## 2019-06-24 RX ORDER — NITROFURANTOIN 25; 75 MG/1; MG/1
CAPSULE ORAL
Refills: 0 | COMMUNITY
Start: 2019-06-22 | End: 2019-08-26 | Stop reason: CLARIF

## 2019-06-24 RX ORDER — PROMETHAZINE HYDROCHLORIDE 12.5 MG/1
12.5 TABLET ORAL EVERY 8 HOURS PRN
Qty: 21 TABLET | Refills: 0 | Status: SHIPPED | OUTPATIENT
Start: 2019-06-24 | End: 2019-07-01

## 2019-06-24 ASSESSMENT — ENCOUNTER SYMPTOMS
COUGH: 0
ABDOMINAL PAIN: 1
APNEA: 0
SINUS PRESSURE: 0
VOMITING: 0
SHORTNESS OF BREATH: 0
DIARRHEA: 0
SINUS PAIN: 0
COLOR CHANGE: 0
CHEST TIGHTNESS: 0
NAUSEA: 1

## 2019-06-24 NOTE — PROGRESS NOTES
Maria Isabel Galarza  1990 06/24/19    Chief Complaint   Patient presents with    Abdominal Pain     since 6/19/19 after eating [employer] chilly.  Nausea     disturbing sleep. SUBJECTIVE:      Patient presents to the office this morning for f/u of epigastric pain and nausea that has been ongoing for the last 5 days. She states that the pain first started after eating chili from [employer]. She was evaluated at urgent care over the weekend and given Zofran for nausea and Pepcid for suspected GERD, both of which she state are not helping. She was also diagnosed with a UTI and started on Macrobid. Denies fever, emesis, blood in bowel movement, or diarrhea. States symptoms are worse at night and when laying flat. Review of Systems   Constitutional: Negative for activity change, appetite change, fatigue and fever. HENT: Negative for congestion, nosebleeds, sinus pressure and sinus pain. Respiratory: Negative for apnea, cough, chest tightness and shortness of breath. Cardiovascular: Negative for chest pain and palpitations. Gastrointestinal: Positive for abdominal pain and nausea. Negative for diarrhea and vomiting. Genitourinary: Negative for difficulty urinating, flank pain and hematuria. Musculoskeletal: Negative for arthralgias, joint swelling and myalgias. Skin: Negative for color change and rash. Neurological: Negative for dizziness, light-headedness and headaches. Psychiatric/Behavioral: Negative. Negative for behavioral problems. OBJECTIVE:    /80 (Site: Left Upper Arm, Position: Sitting, Cuff Size: Large Adult)   Pulse 62   Ht 5' 2.01\" (1.575 m)   Wt 204 lb (92.5 kg)   LMP 06/24/2019   SpO2 98%   BMI 37.30 kg/m²     Physical Exam   Constitutional: She is oriented to person, place, and time. She appears well-developed and well-nourished. No distress. HENT:   Head: Normocephalic and atraumatic. Eyes: Pupils are equal, round, and reactive to light.  Conjunctivae and EOM are normal.   Neck: Normal range of motion. Neck supple. No muscular tenderness present. No edema and no erythema present. Cardiovascular: Normal rate, regular rhythm and normal heart sounds. No murmur heard. Pulmonary/Chest: Effort normal and breath sounds normal. No respiratory distress. Abdominal: Soft. Bowel sounds are normal. She exhibits no distension. There is no tenderness. There is no guarding. Musculoskeletal: Normal range of motion. She exhibits no edema. Neurological: She is alert and oriented to person, place, and time. Coordination normal.   Skin: Skin is warm and dry. Capillary refill takes less than 2 seconds. No rash noted. She is not diaphoretic. No erythema. Psychiatric: She has a normal mood and affect. Her behavior is normal. Judgment and thought content normal.       ASSESSMENT:    1. Epigastric pain        PLAN:    Jesus Manuel Saba was seen today for abdominal pain and nausea. Diagnoses and all orders for this visit:    Epigastric pain- exact etiology unclear at this time. Symptoms consistent with GERD so will change regiment to PPI BID as below. Will Rx short course of temporary stronger nausea peds. Will also check labs and US to assess gallbladder. Continued nausea could simply be side effect of Macrobid. -     promethazine (PHENERGAN) 12.5 MG tablet; Take 1 tablet by mouth every 8 hours as needed for Nausea  -     omeprazole (PRILOSEC) 20 MG delayed release capsule; Take 1 capsule by mouth 2 times daily  -     US ABDOMEN COMPLETE; Future  -     CBC Auto Differential; Future  -     Comprehensive Metabolic Panel; Future  -     LIPASE; Future        No flowsheet data found. Return if symptoms worsen or fail to improve.

## 2019-06-28 ENCOUNTER — HOSPITAL ENCOUNTER (OUTPATIENT)
Dept: ULTRASOUND IMAGING | Age: 29
Discharge: HOME | End: 2019-06-28
Payer: COMMERCIAL

## 2019-06-28 DIAGNOSIS — R10.13 EPIGASTRIC PAIN: ICD-10-CM

## 2019-06-28 PROCEDURE — 76700 US EXAM ABDOM COMPLETE: CPT

## 2019-08-09 DIAGNOSIS — K59.00 CONSTIPATION, UNSPECIFIED CONSTIPATION TYPE: ICD-10-CM

## 2019-08-09 RX ORDER — DOCUSATE SODIUM 100 MG
CAPSULE ORAL
Qty: 30 CAPSULE | Refills: 3 | Status: SHIPPED | OUTPATIENT
Start: 2019-08-09 | End: 2022-03-30 | Stop reason: SDUPTHER

## 2019-08-26 ENCOUNTER — OFFICE VISIT (OUTPATIENT)
Dept: INTERNAL MEDICINE CLINIC | Age: 29
End: 2019-08-26
Payer: COMMERCIAL

## 2019-08-26 VITALS
HEIGHT: 62 IN | RESPIRATION RATE: 18 BRPM | SYSTOLIC BLOOD PRESSURE: 109 MMHG | HEART RATE: 63 BPM | BODY MASS INDEX: 37.61 KG/M2 | DIASTOLIC BLOOD PRESSURE: 69 MMHG | OXYGEN SATURATION: 98 % | WEIGHT: 204.4 LBS

## 2019-08-26 DIAGNOSIS — M54.50 ACUTE RIGHT-SIDED LOW BACK PAIN WITHOUT SCIATICA: Primary | ICD-10-CM

## 2019-08-26 PROCEDURE — 1036F TOBACCO NON-USER: CPT | Performed by: NURSE PRACTITIONER

## 2019-08-26 PROCEDURE — G8417 CALC BMI ABV UP PARAM F/U: HCPCS | Performed by: NURSE PRACTITIONER

## 2019-08-26 PROCEDURE — G8427 DOCREV CUR MEDS BY ELIG CLIN: HCPCS | Performed by: NURSE PRACTITIONER

## 2019-08-26 PROCEDURE — 99213 OFFICE O/P EST LOW 20 MIN: CPT | Performed by: NURSE PRACTITIONER

## 2019-08-26 RX ORDER — PREDNISONE 10 MG/1
TABLET ORAL
Qty: 20 TABLET | Refills: 0 | Status: SHIPPED | OUTPATIENT
Start: 2019-08-26 | End: 2019-11-04 | Stop reason: CLARIF

## 2019-08-26 RX ORDER — CYCLOBENZAPRINE HCL 5 MG
5 TABLET ORAL 2 TIMES DAILY PRN
Qty: 10 TABLET | Refills: 0 | Status: SHIPPED | OUTPATIENT
Start: 2019-08-26 | End: 2019-09-05

## 2019-08-26 ASSESSMENT — ENCOUNTER SYMPTOMS
COLOR CHANGE: 0
ABDOMINAL PAIN: 0
APNEA: 0
NAUSEA: 0
CHEST TIGHTNESS: 0
COUGH: 0
SHORTNESS OF BREATH: 0
DIARRHEA: 0
VOMITING: 0
BACK PAIN: 1

## 2019-08-26 NOTE — PATIENT INSTRUCTIONS
Patient Education        Low Back Pain: Exercises  Introduction  Here are some examples of exercises for you to try. The exercises may be suggested for a condition or for rehabilitation. Start each exercise slowly. Ease off the exercises if you start to have pain. You will be told when to start these exercises and which ones will work best for you. How to do the exercises  Press-up    1. Lie on your stomach, supporting your body with your forearms. 2. Press your elbows down into the floor to raise your upper back. As you do this, relax your stomach muscles and allow your back to arch without using your back muscles. As your press up, do not let your hips or pelvis come off the floor. 3. Hold for 15 to 30 seconds, then relax. 4. Repeat 2 to 4 times. Alternate arm and leg (bird dog) exercise    1. Start on the floor, on your hands and knees. 2. Tighten your belly muscles. 3. Raise one leg off the floor, and hold it straight out behind you. Be careful not to let your hip drop down, because that will twist your trunk. 4. Hold for about 6 seconds, then lower your leg and switch to the other leg. 5. Repeat 8 to 12 times on each leg. 6. Over time, work up to holding for 10 to 30 seconds each time. 7. If you feel stable and secure with your leg raised, try raising the opposite arm straight out in front of you at the same time. Knee-to-chest exercise    1. Lie on your back with your knees bent and your feet flat on the floor. 2. Bring one knee to your chest, keeping the other foot flat on the floor (or keeping the other leg straight, whichever feels better on your lower back). 3. Keep your lower back pressed to the floor. Hold for at least 15 to 30 seconds. 4. Relax, and lower the knee to the starting position. 5. Repeat with the other leg. Repeat 2 to 4 times with each leg. 6. To get more stretch, put your other leg flat on the floor while pulling your knee to your chest.    Curl-ups    1.  Lie on the

## 2019-09-04 DIAGNOSIS — R10.13 EPIGASTRIC PAIN: ICD-10-CM

## 2019-09-04 RX ORDER — OMEPRAZOLE 20 MG/1
CAPSULE, DELAYED RELEASE ORAL
Qty: 30 CAPSULE | Refills: 3 | Status: SHIPPED | OUTPATIENT
Start: 2019-09-04 | End: 2020-03-20 | Stop reason: SDUPTHER

## 2019-09-17 ENCOUNTER — OFFICE VISIT (OUTPATIENT)
Dept: INTERNAL MEDICINE CLINIC | Age: 29
End: 2019-09-17
Payer: COMMERCIAL

## 2019-09-17 VITALS
HEIGHT: 62 IN | OXYGEN SATURATION: 99 % | RESPIRATION RATE: 16 BRPM | WEIGHT: 203 LBS | SYSTOLIC BLOOD PRESSURE: 112 MMHG | DIASTOLIC BLOOD PRESSURE: 81 MMHG | BODY MASS INDEX: 37.36 KG/M2 | HEART RATE: 63 BPM

## 2019-09-17 DIAGNOSIS — H65.192 OTHER NON-RECURRENT ACUTE NONSUPPURATIVE OTITIS MEDIA OF LEFT EAR: Primary | ICD-10-CM

## 2019-09-17 DIAGNOSIS — J02.9 SORE THROAT: ICD-10-CM

## 2019-09-17 LAB — S PYO AG THROAT QL: NORMAL

## 2019-09-17 PROCEDURE — 1036F TOBACCO NON-USER: CPT | Performed by: NURSE PRACTITIONER

## 2019-09-17 PROCEDURE — G8427 DOCREV CUR MEDS BY ELIG CLIN: HCPCS | Performed by: NURSE PRACTITIONER

## 2019-09-17 PROCEDURE — G8417 CALC BMI ABV UP PARAM F/U: HCPCS | Performed by: NURSE PRACTITIONER

## 2019-09-17 PROCEDURE — 99213 OFFICE O/P EST LOW 20 MIN: CPT | Performed by: NURSE PRACTITIONER

## 2019-09-17 PROCEDURE — 87880 STREP A ASSAY W/OPTIC: CPT | Performed by: NURSE PRACTITIONER

## 2019-09-17 RX ORDER — DOXYCYCLINE HYCLATE 100 MG
100 TABLET ORAL 2 TIMES DAILY
Qty: 28 TABLET | Refills: 0 | Status: SHIPPED | OUTPATIENT
Start: 2019-09-17 | End: 2019-10-01

## 2019-09-17 RX ORDER — PREDNISONE 10 MG/1
TABLET ORAL
Qty: 20 TABLET | Refills: 0 | Status: SHIPPED | OUTPATIENT
Start: 2019-09-17 | End: 2019-11-04 | Stop reason: CLARIF

## 2019-09-17 ASSESSMENT — ENCOUNTER SYMPTOMS
VOMITING: 0
ABDOMINAL PAIN: 0
CHEST TIGHTNESS: 0
DIARRHEA: 0
SORE THROAT: 1
SHORTNESS OF BREATH: 0
COUGH: 1
SINUS PAIN: 1
SINUS PRESSURE: 1
COLOR CHANGE: 0
NAUSEA: 0
APNEA: 0

## 2019-11-04 ENCOUNTER — OFFICE VISIT (OUTPATIENT)
Dept: INTERNAL MEDICINE CLINIC | Age: 29
End: 2019-11-04
Payer: COMMERCIAL

## 2019-11-04 VITALS
SYSTOLIC BLOOD PRESSURE: 119 MMHG | HEIGHT: 62 IN | RESPIRATION RATE: 16 BRPM | BODY MASS INDEX: 38.57 KG/M2 | WEIGHT: 209.6 LBS | OXYGEN SATURATION: 98 % | DIASTOLIC BLOOD PRESSURE: 70 MMHG | HEART RATE: 78 BPM

## 2019-11-04 DIAGNOSIS — E55.9 VITAMIN D DEFICIENCY: Primary | ICD-10-CM

## 2019-11-04 DIAGNOSIS — E55.9 VITAMIN D DEFICIENCY: ICD-10-CM

## 2019-11-04 DIAGNOSIS — J30.9 ALLERGIC RHINITIS, UNSPECIFIED SEASONALITY, UNSPECIFIED TRIGGER: ICD-10-CM

## 2019-11-04 DIAGNOSIS — K21.9 GASTROESOPHAGEAL REFLUX DISEASE WITHOUT ESOPHAGITIS: ICD-10-CM

## 2019-11-04 DIAGNOSIS — E07.9 THYROID DISEASE: Primary | ICD-10-CM

## 2019-11-04 DIAGNOSIS — E07.9 THYROID DISEASE: ICD-10-CM

## 2019-11-04 LAB
A/G RATIO: 2.6 (ref 1.1–2.2)
ALBUMIN SERPL-MCNC: 5 G/DL (ref 3.4–5)
ALP BLD-CCNC: 63 U/L (ref 40–129)
ALT SERPL-CCNC: 21 U/L (ref 10–40)
ANION GAP SERPL CALCULATED.3IONS-SCNC: 15 MMOL/L (ref 3–16)
AST SERPL-CCNC: 17 U/L (ref 15–37)
BASOPHILS ABSOLUTE: 0 K/UL (ref 0–0.2)
BASOPHILS RELATIVE PERCENT: 1.1 %
BILIRUB SERPL-MCNC: <0.2 MG/DL (ref 0–1)
BUN BLDV-MCNC: 11 MG/DL (ref 7–20)
CALCIUM SERPL-MCNC: 9.8 MG/DL (ref 8.3–10.6)
CHLORIDE BLD-SCNC: 104 MMOL/L (ref 99–110)
CO2: 25 MMOL/L (ref 21–32)
CREAT SERPL-MCNC: 0.7 MG/DL (ref 0.6–1.1)
EOSINOPHILS ABSOLUTE: 0 K/UL (ref 0–0.6)
EOSINOPHILS RELATIVE PERCENT: 0.9 %
GFR AFRICAN AMERICAN: >60
GFR NON-AFRICAN AMERICAN: >60
GLOBULIN: 1.9 G/DL
GLUCOSE BLD-MCNC: 77 MG/DL (ref 70–99)
HCT VFR BLD CALC: 37.4 % (ref 36–48)
HEMOGLOBIN: 12.6 G/DL (ref 12–16)
LYMPHOCYTES ABSOLUTE: 1.3 K/UL (ref 1–5.1)
LYMPHOCYTES RELATIVE PERCENT: 31.4 %
MCH RBC QN AUTO: 30.8 PG (ref 26–34)
MCHC RBC AUTO-ENTMCNC: 33.6 G/DL (ref 31–36)
MCV RBC AUTO: 91.8 FL (ref 80–100)
MONOCYTES ABSOLUTE: 0.3 K/UL (ref 0–1.3)
MONOCYTES RELATIVE PERCENT: 8.1 %
NEUTROPHILS ABSOLUTE: 2.3 K/UL (ref 1.7–7.7)
NEUTROPHILS RELATIVE PERCENT: 58.5 %
PDW BLD-RTO: 13.7 % (ref 12.4–15.4)
PLATELET # BLD: 295 K/UL (ref 135–450)
PMV BLD AUTO: 8.5 FL (ref 5–10.5)
POTASSIUM SERPL-SCNC: 4.2 MMOL/L (ref 3.5–5.1)
RBC # BLD: 4.08 M/UL (ref 4–5.2)
SODIUM BLD-SCNC: 144 MMOL/L (ref 136–145)
TOTAL PROTEIN: 6.9 G/DL (ref 6.4–8.2)
VITAMIN D 25-HYDROXY: 35.7 NG/ML
WBC # BLD: 4 K/UL (ref 4–11)

## 2019-11-04 PROCEDURE — 99214 OFFICE O/P EST MOD 30 MIN: CPT | Performed by: NURSE PRACTITIONER

## 2019-11-04 PROCEDURE — G8484 FLU IMMUNIZE NO ADMIN: HCPCS | Performed by: NURSE PRACTITIONER

## 2019-11-04 PROCEDURE — G8417 CALC BMI ABV UP PARAM F/U: HCPCS | Performed by: NURSE PRACTITIONER

## 2019-11-04 PROCEDURE — 1036F TOBACCO NON-USER: CPT | Performed by: NURSE PRACTITIONER

## 2019-11-04 PROCEDURE — 36415 COLL VENOUS BLD VENIPUNCTURE: CPT | Performed by: NURSE PRACTITIONER

## 2019-11-04 PROCEDURE — G8427 DOCREV CUR MEDS BY ELIG CLIN: HCPCS | Performed by: NURSE PRACTITIONER

## 2019-11-04 RX ORDER — FLUTICASONE PROPIONATE 50 MCG
1 SPRAY, SUSPENSION (ML) NASAL DAILY
Qty: 2 BOTTLE | Refills: 1 | Status: SHIPPED | OUTPATIENT
Start: 2019-11-04 | End: 2019-12-08

## 2019-11-04 RX ORDER — CETIRIZINE HYDROCHLORIDE 10 MG/1
10 TABLET ORAL DAILY
Qty: 30 TABLET | Refills: 5 | Status: SHIPPED | OUTPATIENT
Start: 2019-11-04 | End: 2019-11-26 | Stop reason: CLARIF

## 2019-11-04 ASSESSMENT — ENCOUNTER SYMPTOMS
SHORTNESS OF BREATH: 0
RHINORRHEA: 1
CHEST TIGHTNESS: 0
NAUSEA: 0
APNEA: 0
VOMITING: 0
ABDOMINAL PAIN: 0
DIARRHEA: 0
COUGH: 0
SINUS PAIN: 0
SINUS PRESSURE: 1
COLOR CHANGE: 0

## 2019-11-05 LAB — TSH REFLEX: 0.53 UIU/ML (ref 0.27–4.2)

## 2019-11-12 DIAGNOSIS — G44.229 CHRONIC TENSION-TYPE HEADACHE, NOT INTRACTABLE: ICD-10-CM

## 2019-11-12 RX ORDER — ACETAMINOPHEN, ASPIRIN AND CAFFEINE 250; 250; 65 MG/1; MG/1; MG/1
TABLET, FILM COATED ORAL
Qty: 90 TABLET | Refills: 3 | Status: SHIPPED | OUTPATIENT
Start: 2019-11-12 | End: 2022-03-30 | Stop reason: CLARIF

## 2019-11-26 ENCOUNTER — OFFICE VISIT (OUTPATIENT)
Dept: INTERNAL MEDICINE CLINIC | Age: 29
End: 2019-11-26
Payer: COMMERCIAL

## 2019-11-26 VITALS
SYSTOLIC BLOOD PRESSURE: 119 MMHG | HEART RATE: 62 BPM | DIASTOLIC BLOOD PRESSURE: 73 MMHG | WEIGHT: 210.4 LBS | OXYGEN SATURATION: 99 % | BODY MASS INDEX: 38.72 KG/M2 | RESPIRATION RATE: 16 BRPM | HEIGHT: 62 IN

## 2019-11-26 DIAGNOSIS — J00 ACUTE NASOPHARYNGITIS: Primary | ICD-10-CM

## 2019-11-26 LAB — S PYO AG THROAT QL: NORMAL

## 2019-11-26 PROCEDURE — G8427 DOCREV CUR MEDS BY ELIG CLIN: HCPCS | Performed by: NURSE PRACTITIONER

## 2019-11-26 PROCEDURE — 1036F TOBACCO NON-USER: CPT | Performed by: NURSE PRACTITIONER

## 2019-11-26 PROCEDURE — 87880 STREP A ASSAY W/OPTIC: CPT | Performed by: NURSE PRACTITIONER

## 2019-11-26 PROCEDURE — G8484 FLU IMMUNIZE NO ADMIN: HCPCS | Performed by: NURSE PRACTITIONER

## 2019-11-26 PROCEDURE — G8417 CALC BMI ABV UP PARAM F/U: HCPCS | Performed by: NURSE PRACTITIONER

## 2019-11-26 PROCEDURE — 99213 OFFICE O/P EST LOW 20 MIN: CPT | Performed by: NURSE PRACTITIONER

## 2019-11-26 RX ORDER — BROMPHENIRAMINE MALEATE, PSEUDOEPHEDRINE HYDROCHLORIDE, AND DEXTROMETHORPHAN HYDROBROMIDE 2; 30; 10 MG/5ML; MG/5ML; MG/5ML
5 SYRUP ORAL 3 TIMES DAILY PRN
Qty: 1 BOTTLE | Refills: 0 | Status: SHIPPED | OUTPATIENT
Start: 2019-11-26 | End: 2019-12-08

## 2019-11-26 RX ORDER — PREDNISONE 10 MG/1
TABLET ORAL
Qty: 20 TABLET | Refills: 0 | Status: SHIPPED
Start: 2019-11-26 | End: 2020-05-05 | Stop reason: CLARIF

## 2019-11-26 ASSESSMENT — ENCOUNTER SYMPTOMS
ABDOMINAL PAIN: 0
SINUS PRESSURE: 1
COLOR CHANGE: 0
SHORTNESS OF BREATH: 0
RHINORRHEA: 1
APNEA: 0
CHEST TIGHTNESS: 0
NAUSEA: 1
VOMITING: 0
COUGH: 0
DIARRHEA: 0
SINUS PAIN: 1

## 2019-12-02 DIAGNOSIS — J01.00 ACUTE NON-RECURRENT MAXILLARY SINUSITIS: Primary | ICD-10-CM

## 2019-12-02 RX ORDER — DOXYCYCLINE HYCLATE 100 MG
100 TABLET ORAL 2 TIMES DAILY
Qty: 28 TABLET | Refills: 0 | Status: SHIPPED | OUTPATIENT
Start: 2019-12-02 | End: 2019-12-16

## 2019-12-08 ENCOUNTER — HOSPITAL ENCOUNTER (EMERGENCY)
Age: 29
Discharge: HOME | End: 2019-12-08

## 2019-12-08 VITALS
WEIGHT: 210 LBS | BODY MASS INDEX: 38.64 KG/M2 | OXYGEN SATURATION: 100 % | SYSTOLIC BLOOD PRESSURE: 130 MMHG | DIASTOLIC BLOOD PRESSURE: 86 MMHG | TEMPERATURE: 98.2 F | RESPIRATION RATE: 17 BRPM | HEART RATE: 84 BPM | HEIGHT: 62 IN

## 2019-12-08 DIAGNOSIS — R05.9 COUGH: ICD-10-CM

## 2019-12-08 DIAGNOSIS — J06.9 ACUTE UPPER RESPIRATORY INFECTION: Primary | ICD-10-CM

## 2019-12-08 PROCEDURE — 99282 EMERGENCY DEPT VISIT SF MDM: CPT

## 2019-12-08 RX ORDER — FLUTICASONE PROPIONATE 50 MCG
1 SPRAY, SUSPENSION (ML) NASAL 2 TIMES DAILY
Qty: 1 BOTTLE | Refills: 0 | Status: SHIPPED | OUTPATIENT
Start: 2019-12-08 | End: 2022-04-20

## 2019-12-08 ASSESSMENT — PAIN DESCRIPTION - ORIENTATION
ORIENTATION: LEFT
ORIENTATION_2: LEFT

## 2019-12-08 ASSESSMENT — PAIN DESCRIPTION - INTENSITY: RATING_2: 6

## 2019-12-08 ASSESSMENT — PAIN DESCRIPTION - PAIN TYPE: TYPE: ACUTE PAIN

## 2019-12-08 ASSESSMENT — PAIN DESCRIPTION - LOCATION
LOCATION_2: EAR
LOCATION: THROAT

## 2019-12-08 ASSESSMENT — PAIN DESCRIPTION - DESCRIPTORS: DESCRIPTORS_2: CONSTANT

## 2019-12-08 ASSESSMENT — PAIN SCALES - GENERAL: PAINLEVEL_OUTOF10: 4

## 2019-12-16 DIAGNOSIS — E07.9 THYROID DISEASE: ICD-10-CM

## 2019-12-16 RX ORDER — LEVOTHYROXINE SODIUM 0.12 MG/1
TABLET ORAL
Qty: 30 TABLET | Refills: 5 | Status: SHIPPED | OUTPATIENT
Start: 2019-12-16 | End: 2019-12-20 | Stop reason: SDUPTHER

## 2019-12-20 DIAGNOSIS — E07.9 THYROID DISEASE: ICD-10-CM

## 2019-12-20 RX ORDER — LEVOTHYROXINE SODIUM 0.12 MG/1
TABLET ORAL
Qty: 30 TABLET | Refills: 5 | Status: SHIPPED | OUTPATIENT
Start: 2019-12-20 | End: 2020-12-15

## 2020-03-20 RX ORDER — OMEPRAZOLE 20 MG/1
20 CAPSULE, DELAYED RELEASE ORAL 2 TIMES DAILY
Qty: 60 CAPSULE | Refills: 3 | Status: SHIPPED | OUTPATIENT
Start: 2020-03-20 | Stop reason: SDUPTHER

## 2020-05-05 ENCOUNTER — TELEMEDICINE (OUTPATIENT)
Dept: INTERNAL MEDICINE CLINIC | Age: 30
End: 2020-05-05

## 2020-05-05 PROCEDURE — 99214 OFFICE O/P EST MOD 30 MIN: CPT | Performed by: NURSE PRACTITIONER

## 2020-05-05 ASSESSMENT — ENCOUNTER SYMPTOMS
APNEA: 0
ABDOMINAL PAIN: 0
SINUS PRESSURE: 0
CHEST TIGHTNESS: 0
COUGH: 0
VOMITING: 0
COLOR CHANGE: 0
DIARRHEA: 0
NAUSEA: 0
SHORTNESS OF BREATH: 0
SINUS PAIN: 0

## 2020-05-05 ASSESSMENT — PATIENT HEALTH QUESTIONNAIRE - PHQ9
2. FEELING DOWN, DEPRESSED OR HOPELESS: 0
DEPRESSION UNABLE TO ASSESS: FUNCTIONAL CAPACITY MOTIVATION LIMITS ACCURACY
SUM OF ALL RESPONSES TO PHQ QUESTIONS 1-9: 0
1. LITTLE INTEREST OR PLEASURE IN DOING THINGS: 0
SUM OF ALL RESPONSES TO PHQ9 QUESTIONS 1 & 2: 0
SUM OF ALL RESPONSES TO PHQ QUESTIONS 1-9: 0

## 2020-05-05 NOTE — PROGRESS NOTES
Musculoskeletal:   [x] Normal gait with no signs of ataxia         [x] Normal range of motion of neck        [] Abnormal-       Neurological:        [x] No Facial Asymmetry (Cranial nerve 7 motor function) (limited exam to video visit)          [x] No gaze palsy        [] Abnormal-         Skin:        [x] No significant exanthematous lesions or discoloration noted on facial skin         [] Abnormal-            Psychiatric:       [x] Normal Affect [x] No Hallucinations        [] Abnormal-     Other pertinent observable physical exam findings-     ASSESSMENT/PLAN:  1. Thyroid disease- recheck levels today and will adjust regiment as needed. - CBC Auto Differential; Future  - Comprehensive Metabolic Panel; Future  - Lipid, Fasting; Future  - TSH with Reflex; Future    2. Vitamin D deficiency- recheck levels and adjust therapy as warranted. - VITAMIN D 25 HYDROXY; Future    3. Seasonal allergies- well controlled; continue Claritin and Flonase. 4. Gastroesophageal reflux disease without esophagitis - well controlled; no changes in management at this time. - CBC Auto Differential; Future  - Comprehensive Metabolic Panel; Future    5. Elevated LDL cholesterol level-  Diet and exercise measure recommendations discussed at length; recheck lipid panel every 6-12 months. Weight loss encouraged. - CBC Auto Differential; Future  - Comprehensive Metabolic Panel; Future  - Lipid, Fasting; Future    Course of treatment, including any medications, possible imaging, referrals, and follow ups discussed with patient. All risks and benefits and possible side effects discussed with patient who agrees to plan of care and verbalizes understanding. All labs and imaging reviewed. Return in about 4 months (around 9/5/2020). Kavya Goodman is a 31 y.o. female being evaluated by a Virtual Visit (video visit) encounter to address concerns as mentioned above. A caregiver was present when appropriate.  Due to this being a TeleHealth encounter (During ZOPZJ-08 public health emergency), evaluation of the following organ systems was limited: Vitals/Constitutional/EENT/Resp/CV/GI//MS/Neuro/Skin/Heme-Lymph-Imm. Pursuant to the emergency declaration under the [address] [place] [address], [address] [place] [address] and the [employer] Act, this Virtual Visit was conducted with patient's (and/or legal guardian's) consent, to reduce the patient's risk of exposure to COVID-19 and provide necessary medical care. The patient (and/or legal guardian) has also been advised to contact this office for worsening conditions or problems, and seek emergency medical treatment and/or call 911 if deemed necessary. Patient identification was verified at the start of the visit: Yes    Total time spent on this encounter: 20-25 minutes    Services were provided through a video synchronous discussion virtually to substitute for in-person clinic visit. Patient and provider were located at their individual homes. --BURTON Ledezma - CNP on 5/5/2020 at 9:40 AM    An electronic signature was used to authenticate this note.

## 2020-06-04 ENCOUNTER — TELEPHONE (OUTPATIENT)
Dept: INTERNAL MEDICINE CLINIC | Age: 30
End: 2020-06-04

## 2020-06-04 RX ORDER — ONDANSETRON 4 MG/1
4 TABLET, FILM COATED ORAL 3 TIMES DAILY PRN
Qty: 30 TABLET | Refills: 0 | Status: SHIPPED | OUTPATIENT
Start: 2020-06-04 | End: 2020-09-25

## 2020-09-14 ENCOUNTER — OFFICE VISIT (OUTPATIENT)
Dept: INTERNAL MEDICINE CLINIC | Age: 30
End: 2020-09-14

## 2020-09-14 VITALS
BODY MASS INDEX: 42.51 KG/M2 | HEIGHT: 62 IN | DIASTOLIC BLOOD PRESSURE: 69 MMHG | RESPIRATION RATE: 16 BRPM | HEART RATE: 62 BPM | SYSTOLIC BLOOD PRESSURE: 119 MMHG | WEIGHT: 231 LBS | OXYGEN SATURATION: 99 %

## 2020-09-14 LAB
BILIRUBIN, POC: NORMAL
BLOOD URINE, POC: NORMAL
CLARITY, POC: CLEAR
COLOR, POC: CLEAR
GLUCOSE URINE, POC: NORMAL
KETONES, POC: NORMAL
LEUKOCYTE EST, POC: NORMAL
NITRITE, POC: NORMAL
PH, POC: 5
PROTEIN, POC: NORMAL
SPECIFIC GRAVITY, POC: 1.02
UROBILINOGEN, POC: 0.2

## 2020-09-14 PROCEDURE — 81002 URINALYSIS NONAUTO W/O SCOPE: CPT | Performed by: NURSE PRACTITIONER

## 2020-09-14 PROCEDURE — 99214 OFFICE O/P EST MOD 30 MIN: CPT | Performed by: NURSE PRACTITIONER

## 2020-09-14 ASSESSMENT — ENCOUNTER SYMPTOMS
ABDOMINAL PAIN: 0
APNEA: 0
COUGH: 0
COLOR CHANGE: 0
SHORTNESS OF BREATH: 0
SINUS PRESSURE: 0
VOMITING: 0
NAUSEA: 0
CHEST TIGHTNESS: 0
SINUS PAIN: 0
DIARRHEA: 0

## 2020-09-14 NOTE — PROGRESS NOTES
[address]  1990 09/14/20    Chief Complaint   Patient presents with    Follow-up     4 month follow up     Back Pain     burning during urination sx started 1 week ago        SUBJECTIVE:      Patient presents to the office this morning for 4 month follow up:    Patient continues to be compliant with synthroid regiment for her hypothyroidism. Denies c/o fatigue, weight gain or loss, heat or cold intolerance, diarrhea, or other GI symptoms. Lab Results   Component Value Date    LDLDIRECT 120 (H) 05/02/2019     Continues to take oral Vit D replacemetnt without any concerns for fatigue, etc. Is getting labs rechecked this week. Seasonal allergies continue to do well on current Claritin and Flonase regiment. Patient's reflux well controlled on current medications. Patient denies any blood in stool black stool, heartburn, reflux. Denies issues with frequent coughing or reflux while sleeping. Able to eat and drink appropriately without complications. 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 Exercise is poor. Review of Systems   Constitutional: Negative for activity change, appetite change, fatigue and fever. HENT: Negative for congestion, nosebleeds, sinus pressure and sinus pain. Respiratory: Negative for apnea, cough, chest tightness and shortness of breath. Cardiovascular: Negative for chest pain and palpitations. Gastrointestinal: Negative for abdominal pain, diarrhea, nausea and vomiting. Genitourinary: Positive for dysuria. Negative for difficulty urinating, flank pain and hematuria. Musculoskeletal: Negative for arthralgias, joint swelling and myalgias. Skin: Negative for color change and rash. Neurological: Negative for dizziness, light-headedness and headaches. Psychiatric/Behavioral: Negative. Negative for behavioral problems.        OBJECTIVE:    /69   Pulse 62   Resp 16 Ht 5' 2\" (1.575 m)   Wt 231 lb (104.8 kg)   SpO2 99%   BMI 42.25 kg/m²     Physical Exam  Constitutional:       General: She is not in acute distress. Appearance: She is well-developed. She is obese. She is not diaphoretic. HENT:      Head: Normocephalic and atraumatic. Nose: Nose normal.      Mouth/Throat:      Pharynx: No oropharyngeal exudate. Eyes:      Conjunctiva/sclera: Conjunctivae normal.      Pupils: Pupils are equal, round, and reactive to light. Neck:      Musculoskeletal: Normal range of motion and neck supple. No edema, erythema or muscular tenderness. Cardiovascular:      Rate and Rhythm: Normal rate and regular rhythm. Heart sounds: Normal heart sounds. No murmur. No friction rub. Pulmonary:      Effort: Pulmonary effort is normal. No respiratory distress. Breath sounds: Normal breath sounds. Abdominal:      General: Bowel sounds are normal.      Palpations: Abdomen is soft. Tenderness: There is no abdominal tenderness. Musculoskeletal: Normal range of motion. Skin:     General: Skin is warm and dry. Capillary Refill: Capillary refill takes less than 2 seconds. Findings: No erythema or rash. Neurological:      Mental Status: She is alert and oriented to person, place, and time. Cranial Nerves: No cranial nerve deficit. Coordination: Coordination normal.      Deep Tendon Reflexes: Reflexes normal.   Psychiatric:         Behavior: Behavior normal.         Thought Content: Thought content normal.         Judgment: Judgment normal.         ASSESSMENT:    1. Thyroid disease    2. Vitamin D deficiency    3. Gastroesophageal reflux disease without esophagitis    4. Elevated LDL cholesterol level    5. Class 3 severe obesity due to excess calories without serious comorbidity with body mass index (BMI) of 40.0 to 44.9 in adult ([facility] [address]    6. Dysuria        PLAN:    Jayashree Martins was seen today for follow-up and back pain.     Diagnoses and all orders for

## 2020-09-17 ENCOUNTER — TELEPHONE (OUTPATIENT)
Dept: FAMILY MEDICINE CLINIC | Age: 30
End: 2020-09-17

## 2020-09-17 LAB
ORGANISM: ABNORMAL
URINE CULTURE, ROUTINE: ABNORMAL

## 2020-09-17 RX ORDER — NITROFURANTOIN 25; 75 MG/1; MG/1
100 CAPSULE ORAL 2 TIMES DAILY
Qty: 14 CAPSULE | Refills: 0 | Status: SHIPPED | OUTPATIENT
Start: 2020-09-17 | End: 2020-09-24

## 2020-09-25 ENCOUNTER — APPOINTMENT (OUTPATIENT)
Dept: GENERAL RADIOLOGY | Age: 30
End: 2020-09-25

## 2020-09-25 ENCOUNTER — HOSPITAL ENCOUNTER (EMERGENCY)
Age: 30
Discharge: HOME | End: 2020-09-25

## 2020-09-25 VITALS
WEIGHT: 230 LBS | RESPIRATION RATE: 19 BRPM | TEMPERATURE: 98.4 F | HEART RATE: 67 BPM | OXYGEN SATURATION: 98 % | DIASTOLIC BLOOD PRESSURE: 77 MMHG | SYSTOLIC BLOOD PRESSURE: 127 MMHG | HEIGHT: 62 IN | BODY MASS INDEX: 42.33 KG/M2

## 2020-09-25 LAB
ALBUMIN SERPL-MCNC: 4.7 GM/DL (ref 3.4–5)
ALP BLD-CCNC: 74 IU/L (ref 40–129)
ALT SERPL-CCNC: 20 U/L (ref 10–40)
ANION GAP SERPL CALCULATED.3IONS-SCNC: 12 MMOL/L (ref 4–16)
AST SERPL-CCNC: 16 IU/L (ref 15–37)
BASOPHILS ABSOLUTE: 0 K/CU MM
BASOPHILS RELATIVE PERCENT: 0.7 % (ref 0–1)
BILIRUB SERPL-MCNC: 0.6 MG/DL (ref 0–1)
BUN BLDV-MCNC: 12 MG/DL (ref 6–23)
CALCIUM SERPL-MCNC: 9.2 MG/DL (ref 8.3–10.6)
CHLORIDE BLD-SCNC: 100 MMOL/L (ref 99–110)
CO2: 26 MMOL/L (ref 21–32)
CREAT SERPL-MCNC: 1 MG/DL (ref 0.6–1.1)
DIFFERENTIAL TYPE: ABNORMAL
EOSINOPHILS ABSOLUTE: 0 K/CU MM
EOSINOPHILS RELATIVE PERCENT: 0.9 % (ref 0–3)
GFR AFRICAN AMERICAN: >60 ML/MIN/1.73M2
GFR NON-AFRICAN AMERICAN: >60 ML/MIN/1.73M2
GLUCOSE BLD-MCNC: 88 MG/DL (ref 70–99)
HCG QUALITATIVE: NEGATIVE
HCT VFR BLD CALC: 42.1 % (ref 37–47)
HEMOGLOBIN: 13.9 GM/DL (ref 12.5–16)
IMMATURE NEUTROPHIL %: 0.2 % (ref 0–0.43)
LYMPHOCYTES ABSOLUTE: 1.2 K/CU MM
LYMPHOCYTES RELATIVE PERCENT: 28.2 % (ref 24–44)
MCH RBC QN AUTO: 31.2 PG (ref 27–31)
MCHC RBC AUTO-ENTMCNC: 33 % (ref 32–36)
MCV RBC AUTO: 94.4 FL (ref 78–100)
MONOCYTES ABSOLUTE: 0.3 K/CU MM
MONOCYTES RELATIVE PERCENT: 7.4 % (ref 0–4)
NUCLEATED RBC %: 0 %
PDW BLD-RTO: 11.9 % (ref 11.7–14.9)
PLATELET # BLD: 288 K/CU MM (ref 140–440)
PMV BLD AUTO: 9.3 FL (ref 7.5–11.1)
POTASSIUM SERPL-SCNC: 3.7 MMOL/L (ref 3.5–5.1)
RBC # BLD: 4.46 M/CU MM (ref 4.2–5.4)
SEGMENTED NEUTROPHILS ABSOLUTE COUNT: 2.7 K/CU MM
SEGMENTED NEUTROPHILS RELATIVE PERCENT: 62.6 % (ref 36–66)
SODIUM BLD-SCNC: 138 MMOL/L (ref 135–145)
TOTAL IMMATURE NEUTOROPHIL: 0.01 K/CU MM
TOTAL NUCLEATED RBC: 0 K/CU MM
TOTAL PROTEIN: 7.6 GM/DL (ref 6.4–8.2)
TROPONIN T: <0.01 NG/ML
TSH HIGH SENSITIVITY: 1.79 UIU/ML (ref 0.27–4.2)
WBC # BLD: 4.3 K/CU MM (ref 4–10.5)

## 2020-09-25 PROCEDURE — 80053 COMPREHEN METABOLIC PANEL: CPT

## 2020-09-25 PROCEDURE — 99285 EMERGENCY DEPT VISIT HI MDM: CPT

## 2020-09-25 PROCEDURE — 84703 CHORIONIC GONADOTROPIN ASSAY: CPT

## 2020-09-25 PROCEDURE — 71045 X-RAY EXAM CHEST 1 VIEW: CPT

## 2020-09-25 PROCEDURE — 84443 ASSAY THYROID STIM HORMONE: CPT

## 2020-09-25 PROCEDURE — 93005 ELECTROCARDIOGRAM TRACING: CPT | Performed by: EMERGENCY MEDICINE

## 2020-09-25 PROCEDURE — 85025 COMPLETE CBC W/AUTO DIFF WBC: CPT

## 2020-09-25 PROCEDURE — 84484 ASSAY OF TROPONIN QUANT: CPT

## 2020-09-25 RX ORDER — ONDANSETRON 4 MG/1
4 TABLET, FILM COATED ORAL EVERY 8 HOURS PRN
Qty: 10 TABLET | Refills: 0 | Status: SHIPPED | OUTPATIENT
Start: 2020-09-25 | End: 2021-03-15 | Stop reason: CLARIF

## 2020-09-25 RX ORDER — CIPROFLOXACIN 500 MG/1
500 TABLET, FILM COATED ORAL 2 TIMES DAILY
Qty: 14 TABLET | Refills: 0 | Status: SHIPPED | OUTPATIENT
Start: 2020-09-25 | End: 2020-10-02

## 2020-09-25 NOTE — ED PROVIDER NOTES
burns     Benadryl [Diphenhydramine Hcl] Nausea And Vomiting       Past medical history:  has a past medical history of Chest pain, GERD (gastroesophageal reflux disease), H/O 24 hour EKG monitoring (11/3/09), H/O cardiovascular stress test (1/8/2015), H/O echocardiogram (9/7/2011), Heart abnormalities, History of cardiac monitoring (11/26/14), History of cardiac monitoring (12/01/2017), History of echocardiogram (12/19/2017), History of EKG (9/6/2011), History of exercise stress test (12/19/2017), Palpitations, Palpitations, Postpartum depression, Sinus arrhythmia, and Thyroid disease. Past surgical history:  has a past surgical history that includes Tonsillectomy; laparoscopy; shoulder surgery (1995); and fracture surgery (1992). Home medications:   Prior to Admission medications    Medication Sig Start Date End Date Taking?  Authorizing Provider   ciprofloxacin (CIPRO) 500 MG tablet Take 1 tablet by mouth 2 times daily for 7 days 9/25/20 10/2/20 Yes Jean Paul Umanzor PA-C   ondansetron (ZOFRAN) 4 MG tablet Take 1 tablet by mouth every 8 hours as needed for Nausea 9/25/20  Yes Jigar Umanzor PA-C   omeprazole (PRILOSEC) 20 MG delayed release capsule Take 1 capsule by mouth 2 times daily 3/20/20   BURTON Iglesias CNP   levothyroxine (SYNTHROID) 125 MCG tablet TAKE ONE TABLET BY MOUTH ONCE DAILY 12/20/19   BURTON Iglesias CNP   fluticasone [facility]) 50 MCG/ACT nasal spray 1 spray by Nasal route 2 times daily 12/8/19   [place], [place], LIAN   EQ HEADACHE RELIEF 556-109-73 MG per tablet TAKE ONE  EVERY 6 HOURS AS NEEDED FOR HEADACHE 11/12/19   Gricelda Handy, APRN - CNP   LOVE STOOL SOFTENER 100 MG capsule TAKE 1 CAPSULE BY MOUTH ONCE DAILY AS NEEDED FOR  CONSTIPATION 8/9/19   BURTON Iglesias CNP   Acetaminophen-Caff-Pyrilamine (MIDOL COMPLETE PO) Take 2 tablets by mouth as needed    Historical Provider, MD   aspirin-acetaminophen-caffeine (Orlena Age) 156-817-35 MG per tablet Take 1 tablet by mouth every 6 hours as needed for Headaches    Historical Provider, MD       Social history:  reports that she has never smoked. She has never used smokeless tobacco. She reports that she does not drink alcohol or use drugs. Family history:    Family History   Problem Relation Age of Onset    Depression Mother     High Cholesterol Mother     Asthma Sister     Depression Sister     High Blood Pressure Sister     Learning Disabilities Sister     Kidney Disease Maternal Aunt     Diabetes Maternal Grandmother     Early Death Maternal Grandmother     Hearing Loss Maternal Grandmother     Heart Disease Maternal Grandmother     High Blood Pressure Maternal Grandmother     Cancer Maternal Grandmother     High Cholesterol Maternal Grandmother     Hypertension Maternal Grandmother     Migraines Maternal Grandmother     Miscarriages / Stillbirths Paternal Grandmother     Thyroid Disease Father     Migraines Sister     Learning Disabilities Sister     Cancer Maternal Grandfather          Exam  /77   Pulse 67   Temp 98.4 °F (36.9 °C)   Resp 19   Ht 5' 2\" (1.575 m)   Wt 230 lb (104.3 kg)   SpO2 98%   BMI 42.07 kg/m²   Nursing note and vitals reviewed. Constitutional: Well developed, well nourished. No acute distress. HENT:      Head: Normocephalic and atraumatic. Ears: External ears normal.      Nose: Nose normal.     Mouth: Membrane mucosa moist and pink. No posterior oropharynx erythema or tonsillar edema  Eyes: Anicteric sclera. No discharge, PERRL  Neck: Supple. Trachea midline. Cardiovascular: RRR, no murmurs, rubs, or gallops, radial pulses 2+ bilaterally. Pulmonary/Chest: Effort normal. No respiratory distress. CTAB. No stridor. No wheezes. No rales. Abdominal: Soft. Nontender to palpation. No distension. No guarding, rebound tenderness, or evidence of ascites. : No CVA tenderness. Musculoskeletal: Moves all extremities.  No gross deformity. No LE edema or TTP noted bilat. Neurological: Alert and oriented to person, place, and time. Normal muscle tone. Skin: Warm and dry. No rash. Psychiatric: Normal mood and affect. Behavior is normal.      EKG   Please see Dr. Hola Mckinnon note for EKG read.       Radiographs (if obtained):  [] The following radiograph was interpreted by myself in the absence of a radiologist:   [x] Radiologist's Report Reviewed:  XR CHEST PORTABLE   Final Result   Normal chest.                Labs  Results for orders placed or performed during the hospital encounter of 09/25/20   CBC Auto Differential   Result Value Ref Range    WBC 4.3 4.0 - 10.5 K/CU MM    RBC 4.46 4.2 - 5.4 M/CU MM    Hemoglobin 13.9 12.5 - 16.0 GM/DL    Hematocrit 42.1 37 - 47 %    MCV 94.4 78 - 100 FL    MCH 31.2 (H) 27 - 31 PG    MCHC 33.0 32.0 - 36.0 %    RDW 11.9 11.7 - 14.9 %    Platelets 402 481 - 110 K/CU MM    MPV 9.3 7.5 - 11.1 FL    Differential Type AUTOMATED DIFFERENTIAL     Segs Relative 62.6 36 - 66 %    Lymphocytes % 28.2 24 - 44 %    Monocytes % 7.4 (H) 0 - 4 %    Eosinophils % 0.9 0 - 3 %    Basophils % 0.7 0 - 1 %    Segs Absolute 2.7 K/CU MM    Lymphocytes Absolute 1.2 K/CU MM    Monocytes Absolute 0.3 K/CU MM    Eosinophils Absolute 0.0 K/CU MM    Basophils Absolute 0.0 K/CU MM    Nucleated RBC % 0.0 %    Total Nucleated RBC 0.0 K/CU MM    Total Immature Neutrophil 0.01 K/CU MM    Immature Neutrophil % 0.2 0 - 0.43 %   CMP   Result Value Ref Range    Sodium 138 135 - 145 MMOL/L    Potassium 3.7 3.5 - 5.1 MMOL/L    Chloride 100 99 - 110 mMol/L    CO2 26 21 - 32 MMOL/L    BUN 12 6 - 23 MG/DL    CREATININE 1.0 0.6 - 1.1 MG/DL    Glucose 88 70 - 99 MG/DL    Calcium 9.2 8.3 - 10.6 MG/DL    Alb 4.7 3.4 - 5.0 GM/DL    Total Protein 7.6 6.4 - 8.2 GM/DL    Total Bilirubin 0.6 0.0 - 1.0 MG/DL    ALT 20 10 - 40 U/L    AST 16 15 - 37 IU/L    Alkaline Phosphatase 74 40 - 129 IU/L    GFR Non-African American >60 >60 mL/min/1.73m2    GFR African American >60 >60 mL/min/1.73m2    Anion Gap 12 4 - 16   TSH without Reflex   Result Value Ref Range    TSH, High Sensitivity 1.790 0.270 - 4.20 uIu/ml   HCG Serum, Qualitative   Result Value Ref Range    hCG Qual NEGATIVE    Troponin   Result Value Ref Range    Troponin T <0.010 <0.01 NG/ML   EKG 12 Lead   Result Value Ref Range    Ventricular Rate 97 BPM    Atrial Rate 97 BPM    P-R Interval 152 ms    QRS Duration 86 ms    Q-T Interval 360 ms    QTc Calculation (Bazett) 457 ms    P Axis 44 degrees    R Axis 10 degrees    T Axis -1 degrees    Diagnosis       Normal sinus rhythm  Cannot rule out Inferior infarct (cited on or before 06-JUL-2017)  Cannot rule out Anterior infarct (cited on or before 06-JUL-2017)  Abnormal ECG  When compared with ECG of 10-JUL-2018 22:43,  QT has lengthened           MDM  Patient presents for palpitations, general fatigue. Began after she started Macrobid. Vital signs here show initial hypertension that resolved without treatment. Exam is benign. EKG shows normal sinus rhythm. Review of records shows that her urine culture did grow gram-negative rods but did not have any identification or sensitivities performed. Laboratory work-up here is unremarkable. Chest x-ray negative. May be side effect of Macrobid, will change to Cipro given her multiple allergies. Plan is to discharge. Doubt malignant arrhythmia, PE, sepsis, ACS. Assessment and plan discussed with patient understands and agrees. Recommended returning to ED for any new or worsening symptoms, follow-up with PCP in 3 to 4 days. I have independently evaluated this patient. Final Impression  1. Other fatigue    2. Palpitations    3. Medication side effect        Blood pressure 127/77, pulse 67, temperature 98.4 °F (36.9 °C), resp. rate 19, height 5' 2\" (1.575 m), weight 230 lb (104.3 kg), SpO2 98 %, not currently breastfeeding. Disposition:  Discharge to home in stable condition.         Patient was

## 2020-09-25 NOTE — ED PROVIDER NOTES
12 lead EKG per my interpretation:  Normal Sinus Rhythm 97  Axis is   Normal  QTc is  457  There is specific T wave changes appreciated. Inverted T wave III  There is no specific ST wave changes appreciated.   Prior EKG to compare with was not available         Serenity Moran,   09/25/20 2016

## 2020-09-26 PROCEDURE — 93010 ELECTROCARDIOGRAM REPORT: CPT | Performed by: INTERNAL MEDICINE

## 2020-10-01 LAB
EKG ATRIAL RATE: 97 BPM
EKG DIAGNOSIS: NORMAL
EKG P AXIS: 44 DEGREES
EKG P-R INTERVAL: 152 MS
EKG Q-T INTERVAL: 360 MS
EKG QRS DURATION: 86 MS
EKG QTC CALCULATION (BAZETT): 457 MS
EKG R AXIS: 10 DEGREES
EKG T AXIS: -1 DEGREES
EKG VENTRICULAR RATE: 97 BPM

## 2020-12-31 RX ORDER — OMEPRAZOLE 20 MG/1
CAPSULE, DELAYED RELEASE ORAL
Qty: 60 CAPSULE | Refills: 0 | Status: SHIPPED | OUTPATIENT
Start: 2020-12-31 | End: 2021-03-15 | Stop reason: SDUPTHER

## 2021-02-04 ENCOUNTER — CARE COORDINATION (OUTPATIENT)
Dept: CARE COORDINATION | Age: 31
End: 2021-02-04

## 2021-02-04 ENCOUNTER — APPOINTMENT (OUTPATIENT)
Dept: GENERAL RADIOLOGY | Age: 31
End: 2021-02-04
Payer: OTHER GOVERNMENT

## 2021-02-04 ENCOUNTER — HOSPITAL ENCOUNTER (EMERGENCY)
Age: 31
Discharge: HOME | End: 2021-02-04
Payer: OTHER GOVERNMENT

## 2021-02-04 VITALS
SYSTOLIC BLOOD PRESSURE: 109 MMHG | BODY MASS INDEX: 43.24 KG/M2 | OXYGEN SATURATION: 97 % | HEIGHT: 62 IN | DIASTOLIC BLOOD PRESSURE: 74 MMHG | RESPIRATION RATE: 18 BRPM | WEIGHT: 235 LBS | HEART RATE: 88 BPM | TEMPERATURE: 98.1 F

## 2021-02-04 DIAGNOSIS — U07.1 COVID-19: Primary | ICD-10-CM

## 2021-02-04 LAB
ALBUMIN SERPL-MCNC: 4.2 GM/DL (ref 3.4–5)
ALP BLD-CCNC: 79 IU/L (ref 40–128)
ALT SERPL-CCNC: 25 U/L (ref 10–40)
ANION GAP SERPL CALCULATED.3IONS-SCNC: 9 MMOL/L (ref 4–16)
AST SERPL-CCNC: 16 IU/L (ref 15–37)
BACTERIA: NEGATIVE /HPF
BASOPHILS ABSOLUTE: 0 K/CU MM
BASOPHILS RELATIVE PERCENT: 0.4 % (ref 0–1)
BILIRUB SERPL-MCNC: 0.4 MG/DL (ref 0–1)
BILIRUBIN URINE: NEGATIVE MG/DL
BLOOD, URINE: ABNORMAL
BUN BLDV-MCNC: 9 MG/DL (ref 6–23)
CALCIUM SERPL-MCNC: 8.5 MG/DL (ref 8.3–10.6)
CHLORIDE BLD-SCNC: 103 MMOL/L (ref 99–110)
CLARITY: CLEAR
CO2: 26 MMOL/L (ref 21–32)
COLOR: YELLOW
CREAT SERPL-MCNC: 0.8 MG/DL (ref 0.6–1.1)
DIFFERENTIAL TYPE: ABNORMAL
EKG ATRIAL RATE: 100 BPM
EKG DIAGNOSIS: NORMAL
EKG P AXIS: 36 DEGREES
EKG P-R INTERVAL: 152 MS
EKG Q-T INTERVAL: 326 MS
EKG QRS DURATION: 80 MS
EKG QTC CALCULATION (BAZETT): 420 MS
EKG R AXIS: 13 DEGREES
EKG T AXIS: 6 DEGREES
EKG VENTRICULAR RATE: 100 BPM
EOSINOPHILS ABSOLUTE: 0 K/CU MM
EOSINOPHILS RELATIVE PERCENT: 0.4 % (ref 0–3)
GFR AFRICAN AMERICAN: >60 ML/MIN/1.73M2
GFR NON-AFRICAN AMERICAN: >60 ML/MIN/1.73M2
GLUCOSE BLD-MCNC: 100 MG/DL (ref 70–99)
GLUCOSE, URINE: NEGATIVE MG/DL
HCG QUALITATIVE: NEGATIVE
HCT VFR BLD CALC: 43.8 % (ref 37–47)
HEMOGLOBIN: 14.3 GM/DL (ref 12.5–16)
IMMATURE NEUTROPHIL %: 0 % (ref 0–0.43)
KETONES, URINE: NEGATIVE MG/DL
LEUKOCYTE ESTERASE, URINE: NEGATIVE
LYMPHOCYTES ABSOLUTE: 0.5 K/CU MM
LYMPHOCYTES RELATIVE PERCENT: 19.1 % (ref 24–44)
MCH RBC QN AUTO: 29.9 PG (ref 27–31)
MCHC RBC AUTO-ENTMCNC: 32.6 % (ref 32–36)
MCV RBC AUTO: 91.6 FL (ref 78–100)
MONOCYTES ABSOLUTE: 0.3 K/CU MM
MONOCYTES RELATIVE PERCENT: 10.9 % (ref 0–4)
MUCUS: ABNORMAL HPF
NITRITE URINE, QUANTITATIVE: NEGATIVE
NUCLEATED RBC %: 0 %
PDW BLD-RTO: 12.2 % (ref 11.7–14.9)
PH, URINE: 6 (ref 5–8)
PLATELET # BLD: 211 K/CU MM (ref 140–440)
PMV BLD AUTO: 9.8 FL (ref 7.5–11.1)
POTASSIUM SERPL-SCNC: 4.6 MMOL/L (ref 3.5–5.1)
PROTEIN UA: NEGATIVE MG/DL
RBC # BLD: 4.78 M/CU MM (ref 4.2–5.4)
RBC URINE: 1 /HPF (ref 0–6)
SEGMENTED NEUTROPHILS ABSOLUTE COUNT: 1.9 K/CU MM
SEGMENTED NEUTROPHILS RELATIVE PERCENT: 69.2 % (ref 36–66)
SODIUM BLD-SCNC: 138 MMOL/L (ref 135–145)
SPECIFIC GRAVITY UA: 1.01 (ref 1–1.03)
SQUAMOUS EPITHELIAL: 4 /HPF
TOTAL IMMATURE NEUTOROPHIL: 0 K/CU MM
TOTAL NUCLEATED RBC: 0 K/CU MM
TOTAL PROTEIN: 7.5 GM/DL (ref 6.4–8.2)
TRANSITIONAL EPITHELIAL: <1 /HPF
TRICHOMONAS: ABNORMAL /HPF
TROPONIN T: <0.01 NG/ML
UROBILINOGEN, URINE: NORMAL MG/DL (ref 0.2–1)
WBC # BLD: 2.7 K/CU MM (ref 4–10.5)
WBC UA: 1 /HPF (ref 0–5)

## 2021-02-04 PROCEDURE — 93010 ELECTROCARDIOGRAM REPORT: CPT | Performed by: INTERNAL MEDICINE

## 2021-02-04 PROCEDURE — 82805 BLOOD GASES W/O2 SATURATION: CPT

## 2021-02-04 PROCEDURE — 99284 EMERGENCY DEPT VISIT MOD MDM: CPT

## 2021-02-04 PROCEDURE — 85025 COMPLETE CBC W/AUTO DIFF WBC: CPT

## 2021-02-04 PROCEDURE — 6360000002 HC RX W HCPCS: Performed by: PHYSICIAN ASSISTANT

## 2021-02-04 PROCEDURE — 96372 THER/PROPH/DIAG INJ SC/IM: CPT

## 2021-02-04 PROCEDURE — 84484 ASSAY OF TROPONIN QUANT: CPT

## 2021-02-04 PROCEDURE — 81001 URINALYSIS AUTO W/SCOPE: CPT

## 2021-02-04 PROCEDURE — 84703 CHORIONIC GONADOTROPIN ASSAY: CPT

## 2021-02-04 PROCEDURE — 93005 ELECTROCARDIOGRAM TRACING: CPT | Performed by: EMERGENCY MEDICINE

## 2021-02-04 PROCEDURE — 71045 X-RAY EXAM CHEST 1 VIEW: CPT

## 2021-02-04 PROCEDURE — 80053 COMPREHEN METABOLIC PANEL: CPT

## 2021-02-04 RX ORDER — KETOROLAC TROMETHAMINE 30 MG/ML
30 INJECTION, SOLUTION INTRAMUSCULAR; INTRAVENOUS ONCE
Status: DISCONTINUED | OUTPATIENT
Start: 2021-02-04 | End: 2021-02-04

## 2021-02-04 RX ORDER — CYCLOBENZAPRINE HCL 10 MG
10 TABLET ORAL 3 TIMES DAILY PRN
Qty: 15 TABLET | Refills: 0 | Status: SHIPPED | OUTPATIENT
Start: 2021-02-04 | End: 2021-03-15 | Stop reason: CLARIF

## 2021-02-04 RX ORDER — METOCLOPRAMIDE HYDROCHLORIDE 5 MG/ML
10 INJECTION INTRAMUSCULAR; INTRAVENOUS ONCE
Status: DISCONTINUED | OUTPATIENT
Start: 2021-02-04 | End: 2021-02-04

## 2021-02-04 RX ORDER — 0.9 % SODIUM CHLORIDE 0.9 %
1000 INTRAVENOUS SOLUTION INTRAVENOUS ONCE
Status: DISCONTINUED | OUTPATIENT
Start: 2021-02-04 | End: 2021-02-04

## 2021-02-04 RX ORDER — KETOROLAC TROMETHAMINE 30 MG/ML
60 INJECTION, SOLUTION INTRAMUSCULAR; INTRAVENOUS ONCE
Status: COMPLETED | OUTPATIENT
Start: 2021-02-04 | End: 2021-02-04

## 2021-02-04 RX ORDER — DIPHENHYDRAMINE HYDROCHLORIDE 50 MG/ML
25 INJECTION INTRAMUSCULAR; INTRAVENOUS ONCE
Status: DISCONTINUED | OUTPATIENT
Start: 2021-02-04 | End: 2021-02-04

## 2021-02-04 RX ORDER — OXYMETAZOLINE HYDROCHLORIDE 0.05 G/100ML
2 SPRAY NASAL ONCE
Status: DISCONTINUED | OUTPATIENT
Start: 2021-02-04 | End: 2021-02-04

## 2021-02-04 RX ADMIN — KETOROLAC TROMETHAMINE 60 MG: 60 INJECTION, SOLUTION INTRAMUSCULAR at 03:16

## 2021-02-04 ASSESSMENT — PAIN SCALES - GENERAL
PAINLEVEL_OUTOF10: 7
PAINLEVEL_OUTOF10: 6

## 2021-02-04 ASSESSMENT — PAIN DESCRIPTION - PAIN TYPE: TYPE: ACUTE PAIN

## 2021-02-04 ASSESSMENT — PAIN DESCRIPTION - LOCATION: LOCATION: GENERALIZED

## 2021-02-04 NOTE — ED NOTES
Discharge instructions reviewed with patient. Patient verbalized understanding. All questions answered.      Brian Stone RN  02/04/21 [id number]
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
## 2021-02-04 NOTE — ED PROVIDER NOTES
As physician-in-triage, I performed a medical screening history and physical exam on this patient. HISTORY OF PRESENT ILLNESS  Keith Ayon is a 31 y.o. female who presents emergency department with chief complaint of shortness of breath, fatigue, headache. Patient reports that she tested positive for Covid within the last 5 days. Patient's vital signs are abnormal.  Patient is tachypneic and tachycardic. Lab work, CT of the chest, blood gas and medications for headache and myalgias were ordered. Patient will be evaluated by physicians at [facility]. PHYSICAL EXAM  /89   Pulse 120   Temp 98.1 °F (36.7 °C) (Oral)   Resp 24   Ht 5' 2\" (1.575 m)   Wt 235 lb (106.6 kg)   LMP 01/27/2021   SpO2 100%   BMI 42.98 kg/m²     On exam, the patient appears in no acute distress. Speech is clear. Breathing is unlabored. Moves all extremities    Comment: Please note this report has been produced using speech recognition software and may contain errors related to that system including errors in grammar, punctuation, and spelling, as well as words and phrases that may be inappropriate. If there are any questions or concerns please feel free to contact the dictating provider for clarification.        Reddy Thakkar DO  02/04/21 1339

## 2021-02-04 NOTE — CARE COORDINATION
Attempted outreach call for ED f/u and COVID-19 monitoring; left a VM with ACM call-back information. If no call back, will make another attempt. Madonna Da Silva RN, BSN  Care Coordinator  Cell [phone]  Email [email or web address]

## 2021-02-07 NOTE — CARE COORDINATION
2nd attempted outreach call for ED f/u and COVID-19 monitoring; left a VM with ACM call-back information. No further outreach at this time. Kenney Renee RN, BSN  Care Coordinator  Cell [phone]  Email [email or web address]

## 2021-03-15 ENCOUNTER — VIRTUAL VISIT (OUTPATIENT)
Dept: INTERNAL MEDICINE CLINIC | Age: 31
End: 2021-03-15

## 2021-03-15 DIAGNOSIS — J30.2 SEASONAL ALLERGIES: ICD-10-CM

## 2021-03-15 DIAGNOSIS — K21.9 GASTROESOPHAGEAL REFLUX DISEASE WITHOUT ESOPHAGITIS: ICD-10-CM

## 2021-03-15 DIAGNOSIS — E07.9 THYROID DISEASE: Primary | ICD-10-CM

## 2021-03-15 DIAGNOSIS — E78.00 ELEVATED LDL CHOLESTEROL LEVEL: ICD-10-CM

## 2021-03-15 PROCEDURE — 99214 OFFICE O/P EST MOD 30 MIN: CPT | Performed by: NURSE PRACTITIONER

## 2021-03-15 RX ORDER — OMEPRAZOLE 20 MG/1
CAPSULE, DELAYED RELEASE ORAL
Qty: 60 CAPSULE | Refills: 5 | Status: SHIPPED | OUTPATIENT
Start: 2021-03-15 | End: 2022-01-31

## 2021-03-15 RX ORDER — LEVOTHYROXINE SODIUM 0.12 MG/1
TABLET ORAL
Qty: 30 TABLET | Refills: 5 | Status: SHIPPED | OUTPATIENT
Start: 2021-03-15 | End: 2021-08-16

## 2021-03-15 ASSESSMENT — PATIENT HEALTH QUESTIONNAIRE - PHQ9
1. LITTLE INTEREST OR PLEASURE IN DOING THINGS: 0
2. FEELING DOWN, DEPRESSED OR HOPELESS: 0
DEPRESSION UNABLE TO ASSESS: FUNCTIONAL CAPACITY MOTIVATION LIMITS ACCURACY
SUM OF ALL RESPONSES TO PHQ QUESTIONS 1-9: 0
SUM OF ALL RESPONSES TO PHQ QUESTIONS 1-9: 0

## 2021-03-15 ASSESSMENT — ENCOUNTER SYMPTOMS
NAUSEA: 0
APNEA: 0
COLOR CHANGE: 0
DIARRHEA: 0
SINUS PAIN: 0
ABDOMINAL PAIN: 0
COUGH: 0
VOMITING: 0
SINUS PRESSURE: 0
SHORTNESS OF BREATH: 0
CHEST TIGHTNESS: 0

## 2021-03-15 NOTE — PROGRESS NOTES
3/15/2021    TELEHEALTH EVALUATION -- Audio/Visual (During TPQDS-39 public health emergency)    HPI:    Mingo Randle (:  1990) has requested an audio/video evaluation for the following concern(s):    6 month follow up:    Patient continues to be compliant with synthroid regiment for her hypothyroidism. Denies c/o fatigue, weight gain or loss, heat or cold intolerance, diarrhea, or other GI symptoms. Lab Results   Component Value Date    TSH 2.09 2018      Seasonal allergies continue to do well on current Claritin and Flonase regiment.      Patient's reflux well controlled on current medications. Patient denies any blood in stool black stool, heartburn, reflux. Denies issues with frequent coughing or reflux while sleeping. Able to eat and drink appropriately without complications.     Still working on weight loss and improving her diet for elevated LDL. Review of Systems   Constitutional: Negative for activity change, appetite change, fatigue and fever. HENT: Negative for congestion, nosebleeds, sinus pressure and sinus pain. Respiratory: Negative for apnea, cough, chest tightness and shortness of breath. Cardiovascular: Negative for chest pain and palpitations. Gastrointestinal: Negative for abdominal pain, diarrhea, nausea and vomiting. Genitourinary: Negative for difficulty urinating, flank pain and hematuria. Musculoskeletal: Negative for arthralgias, joint swelling and myalgias. Skin: Negative for color change and rash. Neurological: Negative for dizziness, light-headedness and headaches. Psychiatric/Behavioral: Negative. Negative for behavioral problems. Prior to Visit Medications    Medication Sig Taking?  Authorizing Provider   levothyroxine (EUTHYROX) 125 MCG tablet Take 1 tablet by mouth once daily Yes BURTON De La Vega CNP   omeprazole (PRILOSEC) 20 MG delayed release capsule Take 1 capsule by mouth twice daily Yes BURTON De La Vega CNP   fluticasone (FLONASE) 50 MCG/ACT nasal spray 1 spray by Nasal route 2 times daily Yes Jazmin Mcgarry PA-C   omeprazole (PRILOSEC) 20 MG delayed release capsule Take 1 capsule by mouth 2 times daily  BURTON Dhaliwal CNP   EQ HEADACHE RELIEF 250-250-65 MG per tablet TAKE ONE  EVERY 6 HOURS AS NEEDED FOR HEADACHE  BURTON Dhaliwal CNP   LOVE STOOL SOFTENER 100 MG capsule TAKE 1 CAPSULE BY MOUTH ONCE DAILY AS NEEDED FOR  CONSTIPATION  BURTON Dhaliwal CNP   Acetaminophen-Caff-Pyrilamine (MIDOL COMPLETE PO) Take 2 tablets by mouth as needed  Historical Provider, MD   aspirin-acetaminophen-caffeine (Leonore Brooker) 864-959-90 MG per tablet Take 1 tablet by mouth every 6 hours as needed for Headaches  Historical Provider, MD       Social History     Tobacco Use    Smoking status: Never Smoker    Smokeless tobacco: Never Used    Tobacco comment: reviewed 1/22/15   Substance Use Topics    Alcohol use: No    Drug use: No        Allergies   Allergen Reactions    Amoxicillin Hives    Pcn [Penicillins] Hives    Sulfa Antibiotics Other (See Comments)     Face turns red, itches and burns     Benadryl [Diphenhydramine Hcl] Nausea And Vomiting   ,   Past Medical History:   Diagnosis Date    Chest pain     GERD (gastroesophageal reflux disease)     H/O 24 hour EKG monitoring 11/3/09    48 hr holter monitor. Significant for sinus tachycardia episode the fastest of which is at 171 beats per min lasting for 3 min and 17 sec.  H/O cardiovascular stress test 1/8/2015    treadmill    H/O echocardiogram 9/7/2011    EF>55%. Mild concentric left ventricular hypertrophy. Mild MR & TR. Mild mitral annular calcification.  Heart abnormalities     Valve leaking see's Dr Tessa Kay History of cardiac monitoring 11/26/14    Event Monitor: Sinus tachy-theresa episodes but not clinically significant.      History of cardiac monitoring 12/01/2017    30 day event monitoring     History of echocardiogram 12/19/2017 Normal left ventricle structure and function. Ejection fraction is visually estimated at >60%. No significant valvular disease noted. No evidence of pericardial effusion. Essentially normal echo.  History of EKG 9/6/2011    Sinus arrhythmia. Tightward axis. Diffuse nonspecific T wave abnormalities.  History of exercise stress test 12/19/2017    treadmill    Palpitations     Palpitations     Postpartum depression     Sinus arrhythmia     Thyroid disease     Underactive thyroid   ,   Past Surgical History:   Procedure Laterality Date    FRACTURE SURGERY  1992    Left elbow/metal plate in elbow    LAPAROSCOPY      lap for cyst on ovary and checked for endometreosis    SHOULDER SURGERY  1995    left    TONSILLECTOMY     ,   Social History     Tobacco Use    Smoking status: Never Smoker    Smokeless tobacco: Never Used    Tobacco comment: reviewed 1/22/15   Substance Use Topics    Alcohol use: No    Drug use: No       PHYSICAL EXAMINATION:  [ INSTRUCTIONS:  \"[x]\" Indicates a positive item  \"[]\" Indicates a negative item  -- DELETE ALL ITEMS NOT EXAMINED]  Vital Signs: (As obtained by patient/caregiver or practitioner observation)    Blood pressure-  Heart rate-    Respiratory rate-    Temperature-  Pulse oximetry-     Constitutional: [x] Appears well-developed and well-nourished [x] No apparent distress      [] Abnormal-   Mental status  [x] Alert and awake  [x] Oriented to person/place/time [x]Able to follow commands      Eyes:  EOM    [x]  Normal  [] Abnormal-  Sclera  [x]  Normal  [] Abnormal -         Discharge [x]  None visible  [] Abnormal -    HENT:   [x] Normocephalic, atraumatic.   [] Abnormal   [x] Mouth/Throat: Mucous membranes are moist.     External Ears [x] Normal  [] Abnormal-     Neck: [x] No visualized mass     Pulmonary/Chest: [x] Respiratory effort normal.  [x] No visualized signs of difficulty breathing or respiratory distress        [] Abnormal-      Musculoskeletal:   [x] Normal gait with no signs of ataxia         [x] Normal range of motion of neck        [] Abnormal-       Neurological:        [x] No Facial Asymmetry (Cranial nerve 7 motor function) (limited exam to video visit)          [x] No gaze palsy        [] Abnormal-         Skin:        [x] No significant exanthematous lesions or discoloration noted on facial skin         [] Abnormal-            Psychiatric:       [x] Normal Affect [x] No Hallucinations        [] Abnormal-     Other pertinent observable physical exam findings-     ASSESSMENT/PLAN:  1. Thyroid disease- compliant with regiment; pt is asymptomatic; recheck TSH today and will adjust medication as appropriate. - levothyroxine (EUTHYROX) 125 MCG tablet; Take 1 tablet by mouth once daily  Dispense: 30 tablet; Refill: 5  - TSH with Reflex; Future    2. Gastroesophageal reflux disease without esophagitis- controlled on current PPI regiment; continue Omeprazole; no changes. 3. Elevated LDL cholesterol level- diet improvement measures discussed at length and weight loss encouraged; recheck lipid panel today. - CBC Auto Differential; Future  - Comprehensive Metabolic Panel; Future  - Lipid, Fasting; Future    4. Seasonal allergies- well controlled on Flonase and Claritin; no changes at this time needed. Course of treatment, including any medications, possible imaging, referrals, and follow ups discussed with patient. All risks and benefits and possible side effects discussed with patient who agrees to plan of care and verbalizes understanding. All labs and imaging reviewed. No follow-ups on file. Paruldelizabeth YoungDubois, was evaluated through a synchronous (real-time) audio-video encounter. The patient (or guardian if applicable) is aware that this is a billable service. Verbal consent to proceed has been obtained within the past 12 months.  The visit was conducted pursuant to the emergency declaration under the [address], [address] waiver authority and the [employer] and [employer] General Act. Patient identification was verified, and a caregiver was present when appropriate. The patient was located in a state where the provider was credentialed to provide care. Total time spent on this encounter: 25 minutes    --BURTON Jane CNP on 3/15/2021 at 10:16 AM    An electronic signature was used to authenticate this note.

## 2021-03-29 DIAGNOSIS — E07.9 THYROID DISEASE: ICD-10-CM

## 2021-03-29 DIAGNOSIS — E78.00 ELEVATED LDL CHOLESTEROL LEVEL: ICD-10-CM

## 2021-03-29 LAB
A/G RATIO: 1.7 (ref 1.1–2.2)
ALBUMIN SERPL-MCNC: 4.5 G/DL (ref 3.4–5)
ALP BLD-CCNC: 71 U/L (ref 40–129)
ALT SERPL-CCNC: 18 U/L (ref 10–40)
ANION GAP SERPL CALCULATED.3IONS-SCNC: 9 MMOL/L (ref 3–16)
AST SERPL-CCNC: 16 U/L (ref 15–37)
BASOPHILS ABSOLUTE: 0 K/UL (ref 0–0.2)
BASOPHILS RELATIVE PERCENT: 0.9 %
BILIRUB SERPL-MCNC: 0.3 MG/DL (ref 0–1)
BUN BLDV-MCNC: 13 MG/DL (ref 7–20)
CALCIUM SERPL-MCNC: 9.4 MG/DL (ref 8.3–10.6)
CHLORIDE BLD-SCNC: 103 MMOL/L (ref 99–110)
CHOLESTEROL, FASTING: 180 MG/DL (ref 0–199)
CO2: 27 MMOL/L (ref 21–32)
CREAT SERPL-MCNC: 0.7 MG/DL (ref 0.6–1.1)
EOSINOPHILS ABSOLUTE: 0 K/UL (ref 0–0.6)
EOSINOPHILS RELATIVE PERCENT: 1.1 %
GFR AFRICAN AMERICAN: >60
GFR NON-AFRICAN AMERICAN: >60
GLOBULIN: 2.7 G/DL
GLUCOSE BLD-MCNC: 99 MG/DL (ref 70–99)
HCT VFR BLD CALC: 37 % (ref 36–48)
HDLC SERPL-MCNC: 59 MG/DL (ref 40–60)
HEMOGLOBIN: 12.6 G/DL (ref 12–16)
LDL CHOLESTEROL CALCULATED: 108 MG/DL
LYMPHOCYTES ABSOLUTE: 1.3 K/UL (ref 1–5.1)
LYMPHOCYTES RELATIVE PERCENT: 35 %
MCH RBC QN AUTO: 30.8 PG (ref 26–34)
MCHC RBC AUTO-ENTMCNC: 34 G/DL (ref 31–36)
MCV RBC AUTO: 90.6 FL (ref 80–100)
MONOCYTES ABSOLUTE: 0.4 K/UL (ref 0–1.3)
MONOCYTES RELATIVE PERCENT: 9.4 %
NEUTROPHILS ABSOLUTE: 2 K/UL (ref 1.7–7.7)
NEUTROPHILS RELATIVE PERCENT: 53.6 %
PDW BLD-RTO: 13.3 % (ref 12.4–15.4)
PLATELET # BLD: 270 K/UL (ref 135–450)
PMV BLD AUTO: 8.2 FL (ref 5–10.5)
POTASSIUM SERPL-SCNC: 3.7 MMOL/L (ref 3.5–5.1)
RBC # BLD: 4.09 M/UL (ref 4–5.2)
SODIUM BLD-SCNC: 139 MMOL/L (ref 136–145)
TOTAL PROTEIN: 7.2 G/DL (ref 6.4–8.2)
TRIGLYCERIDE, FASTING: 63 MG/DL (ref 0–150)
TSH REFLEX: 2.06 UIU/ML (ref 0.27–4.2)
VLDLC SERPL CALC-MCNC: 13 MG/DL
WBC # BLD: 3.7 K/UL (ref 4–11)

## 2021-03-29 PROCEDURE — 36415 COLL VENOUS BLD VENIPUNCTURE: CPT | Performed by: NURSE PRACTITIONER

## 2021-04-06 ENCOUNTER — TELEPHONE (OUTPATIENT)
Dept: INTERNAL MEDICINE CLINIC | Age: 31
End: 2021-04-06

## 2021-04-06 DIAGNOSIS — J32.9 SINUSITIS, UNSPECIFIED CHRONICITY, UNSPECIFIED LOCATION: Primary | ICD-10-CM

## 2021-04-06 RX ORDER — AZITHROMYCIN 250 MG/1
250 TABLET, FILM COATED ORAL SEE ADMIN INSTRUCTIONS
Qty: 6 TABLET | Refills: 0 | Status: SHIPPED | OUTPATIENT
Start: 2021-04-06 | End: 2021-04-11

## 2021-04-06 NOTE — TELEPHONE ENCOUNTER
Would  be able to call me in something for possible sinus infection? When I had my video call appointment I had told him I have been struggling with this sinus pressure and stuffy nose and feeling like it was stuck in my throat since having covid. I have been taking muscinex DM and Advil cold and sinus for more then 3 weeks which is probably too long but I was trying to kick it myself without needing anything else. My left ear is starting to hurt and I have a headache I can't seem to get rid of and it's all making me nauseous. I am sick to my stomach and don't really want to eat or drink.

## 2021-06-22 NOTE — ED NOTES
EKG was done at [address]  02/04/21 4785 Results from last 7 days   Lab Units 06/22/21  0443 06/21/21  0524 06/20/21  0517 06/19/21  0449 06/18/21  1318   BUN mg/dL 1* 1* 4* 5 6   CREATININE mg/dL 0 70 0 86 1 01 1 55* 2 22*   EGFR ml/min/1 73sq m 104 81 66 40 26       Patient was on diuretics for lower extremity edema and had been recently increased likely causing this dehydration and acute kidney injury  Continue to hold diuretic  Still lightheaded - will place on midodrine scheduled  Resolved

## 2021-08-15 DIAGNOSIS — E07.9 THYROID DISEASE: ICD-10-CM

## 2021-08-16 RX ORDER — LEVOTHYROXINE SODIUM 0.12 MG/1
TABLET ORAL
Qty: 30 TABLET | Refills: 0 | Status: SHIPPED | OUTPATIENT
Start: 2021-08-16 | End: 2021-09-15

## 2021-11-15 ENCOUNTER — OFFICE VISIT (OUTPATIENT)
Dept: FAMILY MEDICINE CLINIC | Age: 31
End: 2021-11-15

## 2021-11-15 ENCOUNTER — HOSPITAL ENCOUNTER (OUTPATIENT)
Age: 31
Setting detail: SPECIMEN
Discharge: HOME | End: 2021-11-15

## 2021-11-15 VITALS — HEART RATE: 65 BPM | WEIGHT: 219.4 LBS | OXYGEN SATURATION: 100 % | BODY MASS INDEX: 40.13 KG/M2 | TEMPERATURE: 97.3 F

## 2021-11-15 DIAGNOSIS — N30.01 ACUTE CYSTITIS WITH HEMATURIA: Primary | ICD-10-CM

## 2021-11-15 DIAGNOSIS — R51.9 GENERALIZED HEADACHE: ICD-10-CM

## 2021-11-15 DIAGNOSIS — R39.9 UTI SYMPTOMS: ICD-10-CM

## 2021-11-15 DIAGNOSIS — M79.10 MUSCLE ACHE: ICD-10-CM

## 2021-11-15 DIAGNOSIS — R11.0 NAUSEA: ICD-10-CM

## 2021-11-15 DIAGNOSIS — R05.9 COUGH: ICD-10-CM

## 2021-11-15 DIAGNOSIS — R09.81 NASAL CONGESTION: ICD-10-CM

## 2021-11-15 LAB
BILIRUBIN, POC: NEGATIVE
BLOOD URINE, POC: ABNORMAL
CLARITY, POC: CLEAR
COLOR, POC: YELLOW
GLUCOSE URINE, POC: NEGATIVE
KETONES, POC: NEGATIVE
LEUKOCYTE EST, POC: NEGATIVE
NITRITE, POC: ABNORMAL
PH, POC: 5.5
PROTEIN, POC: NEGATIVE
SPECIFIC GRAVITY, POC: 1.01
UROBILINOGEN, POC: 0.2

## 2021-11-15 PROCEDURE — 81002 URINALYSIS NONAUTO W/O SCOPE: CPT | Performed by: NURSE PRACTITIONER

## 2021-11-15 PROCEDURE — 87086 URINE CULTURE/COLONY COUNT: CPT

## 2021-11-15 PROCEDURE — 99213 OFFICE O/P EST LOW 20 MIN: CPT | Performed by: NURSE PRACTITIONER

## 2021-11-15 PROCEDURE — U0003 INFECTIOUS AGENT DETECTION BY NUCLEIC ACID (DNA OR RNA); SEVERE ACUTE RESPIRATORY SYNDROME CORONAVIRUS 2 (SARS-COV-2) (CORONAVIRUS DISEASE [COVID-19]), AMPLIFIED PROBE TECHNIQUE, MAKING USE OF HIGH THROUGHPUT TECHNOLOGIES AS DESCRIBED BY CMS-2020-01-R: HCPCS

## 2021-11-15 PROCEDURE — U0005 INFEC AGEN DETEC AMPLI PROBE: HCPCS

## 2021-11-15 RX ORDER — NITROFURANTOIN 25; 75 MG/1; MG/1
100 CAPSULE ORAL 2 TIMES DAILY
Qty: 10 CAPSULE | Refills: 0 | Status: SHIPPED | OUTPATIENT
Start: 2021-11-15 | End: 2021-11-18 | Stop reason: CLARIF

## 2021-11-15 NOTE — PROGRESS NOTES
11/15/21  Cesilia KAMARA Yesy  1990    FLU/COVID-19 CLINIC EVALUATION    HPI SYMPTOMS:    Employer:    [] Fevers  [] Chills  [x] Cough  [] Coughing up blood  [] Chest Congestion  [x] Nasal Congestion      Bloody nose and boogers    [] Feeling short of breath  [] Sometimes  [] Frequently  [] All the time  [] Chest pain  [x] Headaches  []Tolerable  [x] Severe  [x] Sore throat  [x] Muscle aches  [x] Nausea  [] Vomiting  []Unable to keep fluids down  [] Diarrhea  []Severe    [x] OTHER SYMPTOMS:   Lower back pain    Symptom Duration:   [] 1  [] 2   [] 3   [] 4    [x] 5   [] 6   [] 7   [] 8   [] 9   [] 10   [] 11   [] 12   [] 13   [] 14   [] Longer than 14 days    Symptom course:   [] Worsening     [x] Stable     [] Improving    RISK FACTORS:    [] Pregnant or possibly pregnant  [] Age over 61  [] Diabetes  [] Heart disease  [] Asthma  [] COPD/Other chronic lung diseases  [] Active Cancer  [] On Chemotherapy  [] Taking oral steroids  [] History Lymphoma/Leukemia  [] Close contact with a lab confirmed COVID-19 patient within 14 days of symptom onset  [] History of travel from affected geographical areas within 14 days of symptom onset       VITALS:  There were no vitals filed for this visit. TESTS:    POCT FLU:  [] Positive     []Negative    ASSESSMENT:    [] Flu  [] Possible COVID-19  [] Strep    PLAN:    [] Discharge home with written instructions for:  [] Flu management  [] Possible COVID-19 infection with self-quarantine and management of symptoms  [] Follow-up with primary care physician or emergency department if worsens  [x] Evaluation per physician/NP/PA in clinic  [] Sent to ER       An  electronic signature was used to authenticate this note.      --Vasile Nino MA on 11/15/2021 at 10:09 AM

## 2021-11-15 NOTE — PROGRESS NOTES
11/15/2021    HPI:  Chief complaint and history of present illness as per medical assistant/nurse documented today in the Flu/COVID-19 clinic. Patient is here with complaints of cough, nasal congestion, headache, sore throat, muscle aches, and nausea x 5 days. Patient states she had covid in January 2021. Patient states she is also having UTI symptoms - right flank pain, increased frequency, and dysuria. Patient denies hematuria and urgency. MEDICATIONS:  Prior to Visit Medications    Medication Sig Taking? Authorizing Provider   levothyroxine (EUTHYROX) 125 MCG tablet Take 1 tablet by mouth once daily  Jeffrey Cassis, APRN - CNP   omeprazole (PRILOSEC) 20 MG delayed release capsule Take 1 capsule by mouth twice daily  Jeffrey Cassis, APRN - CNP   omeprazole (PRILOSEC) 20 MG delayed release capsule Take 1 capsule by mouth 2 times daily  Jeffrey Cassis, APRN - CNP   fluticasone (FLONASE) 50 MCG/ACT nasal spray 1 spray by Nasal route 2 times daily  Rodney Rodriguez PA-C   EQ HEADACHE RELIEF 250-250-65 MG per tablet TAKE ONE  EVERY 6 HOURS AS NEEDED FOR HEADACHE  Jeffrey Cassis, APRN - CNP   LOVE STOOL SOFTENER 100 MG capsule TAKE 1 CAPSULE BY MOUTH ONCE DAILY AS NEEDED FOR  CONSTIPATION  Jeffrey Cassis, APRN - CNP   Acetaminophen-Caff-Pyrilamine (MIDOL COMPLETE PO) Take 2 tablets by mouth as needed  Historical Provider, MD   aspirin-acetaminophen-caffeine ([facility]) [phone] MG per tablet Take 1 tablet by mouth every 6 hours as needed for Headaches  Historical Provider, MD       Allergies   Allergen Reactions    Amoxicillin Hives    Pcn [Penicillins] Hives    Sulfa Antibiotics Other (See Comments)     Face turns red, itches and burns     Benadryl [Diphenhydramine Hcl] Nausea And Vomiting   ,   Past Medical History:   Diagnosis Date    Chest pain     GERD (gastroesophageal reflux disease)     H/O 24 hour EKG monitoring 11/3/09    48 hr holter monitor.  Significant for sinus tachycardia episode the fastest of which is at 171 beats per min lasting for 3 min and 17 sec.  H/O cardiovascular stress test 1/8/2015    treadmill    H/O echocardiogram 9/7/2011    EF>55%. Mild concentric left ventricular hypertrophy. Mild MR & TR. Mild mitral annular calcification.  Heart abnormalities     Valve leaking see's Dr Esther Martinez History of cardiac monitoring 11/26/14    Event Monitor: Sinus tachy-theresa episodes but not clinically significant.  History of cardiac monitoring 12/01/2017    30 day event monitoring     History of echocardiogram 12/19/2017    Normal left ventricle structure and function. Ejection fraction is visually estimated at >60%. No significant valvular disease noted. No evidence of pericardial effusion. Essentially normal echo.  History of EKG 9/6/2011    Sinus arrhythmia. Tightward axis. Diffuse nonspecific T wave abnormalities.     History of exercise stress test 12/19/2017    treadmill    Palpitations     Palpitations     Postpartum depression     Sinus arrhythmia     Thyroid disease     Underactive thyroid   ,   Past Surgical History:   Procedure Laterality Date    FRACTURE SURGERY  1992    Left elbow/metal plate in elbow    LAPAROSCOPY      lap for cyst on ovary and checked for endometreosis    SHOULDER SURGERY  1995    left    TONSILLECTOMY     ,   Social History     Tobacco Use    Smoking status: Never Smoker    Smokeless tobacco: Never Used    Tobacco comment: reviewed 1/22/15   Vaping Use    Vaping Use: Never used   Substance Use Topics    Alcohol use: No    Drug use: No   ,   Family History   Problem Relation Age of Onset    Depression Mother     High Cholesterol Mother     Asthma Sister     Depression Sister     High Blood Pressure Sister     Learning Disabilities Sister     Kidney Disease Maternal Aunt     Diabetes Maternal Grandmother     Early Death Maternal Grandmother     Hearing Loss Maternal Grandmother     Heart Disease Maternal Grandmother  High Blood Pressure Maternal Grandmother     Cancer Maternal Grandmother     High Cholesterol Maternal Grandmother     Hypertension Maternal Grandmother     Migraines Maternal Grandmother     Miscarriages / Stillbirths Paternal Grandmother     Thyroid Disease Father     Migraines Sister     Learning Disabilities Sister     Cancer Maternal Grandfather    ,   Immunization History   Administered Date(s) Administered    Tdap (Boostrix, Adacel) 01/15/2012   ,   Health Maintenance   Topic Date Due    Varicella vaccine (1 of 2 - 2-dose childhood series) Never done    COVID-19 Vaccine (1) Never done    Cervical cancer screen  Never done    Flu vaccine (1) Never done    DTaP/Tdap/Td vaccine (2 - Td or Tdap) 01/15/2022    HIV screen  Completed    Hepatitis A vaccine  Aged Out    Hepatitis B vaccine  Aged Out    Hib vaccine  Aged Out    Meningococcal (ACWY) vaccine  Aged Out    Pneumococcal 0-64 years Vaccine  Aged Out    Hepatitis C screen  Discontinued       PHYSICAL EXAM:  Physical Exam  Constitutional:       Appearance: Normal appearance. HENT:      Head: Normocephalic. Right Ear: Tympanic membrane, ear canal and external ear normal.      Left Ear: Tympanic membrane, ear canal and external ear normal.      Nose: Congestion (slight) present. Mouth/Throat:      Lips: Pink. Mouth: Mucous membranes are moist.      Pharynx: Oropharynx is clear. Cardiovascular:      Rate and Rhythm: Normal rate and regular rhythm. Heart sounds: Normal heart sounds. Pulmonary:      Effort: Pulmonary effort is normal.      Breath sounds: Normal breath sounds. Musculoskeletal:      Cervical back: Neck supple. Skin:     General: Skin is warm and dry. Neurological:      Mental Status: She is alert and oriented to person, place, and time.    Psychiatric:         Mood and Affect: Mood normal.         Behavior: Behavior normal.       Results for orders placed or performed in visit on 11/15/21 POCT Urinalysis no Micro   Result Value Ref Range    Color, UA Yellow     Clarity, UA Clear     Glucose, UA POC Negative     Bilirubin, UA Negative     Ketones, UA Negative     Spec Grav, UA 1.010     Blood, UA POC Small     pH, UA 5.5     Protein, UA POC Negative     Urobilinogen, UA 0.2     Leukocytes, UA Negative     Nitrite, UA Trace          ASSESSMENT/PLAN:  1. Acute cystitis with hematuria  Patient advised to take antibiotics as prescribed, push fluids and may take OTC azo for urinary pain if needed. Follow up if no improvement or symptoms worsen. F/U immediately if you develop fevers, chills, nausea, vomiting, body aches, or flank pain. - nitrofurantoin, macrocrystal-monohydrate, (MACROBID) 100 MG capsule; Take 1 capsule by mouth 2 times daily for 5 days  Dispense: 10 capsule; Refill: 0    2. Cough  - Covid-19 Ambulatory    3. Nasal congestion  - Covid-19 Ambulatory    4. Generalized headache  - Covid-19 Ambulatory    5. Muscle ache  - Covid-19 Ambulatory    6. Nausea  - Covid-19 Ambulatory    7. UTI symptoms  - POCT Urinalysis no Micro  - Culture, Urine        FOLLOW-UP:  Return if symptoms worsen or fail to improve.     In addition to other information, the printed after visit summary provided to the patient includes:  [x] COVID-19 Self care instructions  [x] COVID-19 General patient information

## 2021-11-15 NOTE — PATIENT INSTRUCTIONS
Your COVID 19 test can take 1-5 days for the results to come back. We ask that you make a Mychart page and view your test results this way. You will need to Self quarantine until you know your results. Increase fluids and rest  Saline nasal spray as needed for nasal congestion  Warm salt gargles as needed for throat discomfort  Monitor temperature twice a day  Tylenol as needed for fevers and/or discomfort. Big deep breaths periodically throughout the day  Regular Mucinex over the counter as needed for chest congestion  If symptoms worsen -Go to the ER. Follow up with your primary care provider      To Whom it May Concern:    Amira Sanford was tested for COVID-19 11/15/2021. He/she must stay home until test results are back. If test is positive, he/she must quarantine for a total of 10 days starting from day one of symptom onset. He/she must also be fever-free for 24 hours at that time, and also have improvement in symptoms. We do not recommend retesting as patients may continue to test positive for months even though no longer contagious. It is suggested you call [address] or [address] [employer] with any questions regarding quarantine timeframe/return to work/school details.

## 2021-11-16 LAB
CULTURE: NORMAL
Lab: NORMAL
SARS-COV-2: NOT DETECTED
SOURCE: NORMAL
SPECIMEN: NORMAL

## 2021-11-18 ENCOUNTER — VIRTUAL VISIT (OUTPATIENT)
Dept: INTERNAL MEDICINE CLINIC | Age: 31
End: 2021-11-18

## 2021-11-18 ENCOUNTER — TELEPHONE (OUTPATIENT)
Dept: INTERNAL MEDICINE CLINIC | Age: 31
End: 2021-11-18

## 2021-11-18 DIAGNOSIS — J01.00 ACUTE NON-RECURRENT MAXILLARY SINUSITIS: Primary | ICD-10-CM

## 2021-11-18 PROCEDURE — 99213 OFFICE O/P EST LOW 20 MIN: CPT | Performed by: NURSE PRACTITIONER

## 2021-11-18 RX ORDER — PREDNISONE 10 MG/1
TABLET ORAL
Qty: 20 TABLET | Refills: 0 | Status: SHIPPED | OUTPATIENT
Start: 2021-11-18 | End: 2022-02-25 | Stop reason: CLARIF

## 2021-11-18 RX ORDER — DOXYCYCLINE HYCLATE 100 MG
100 TABLET ORAL 2 TIMES DAILY
Qty: 14 TABLET | Refills: 0 | Status: SHIPPED | OUTPATIENT
Start: 2021-11-18 | End: 2021-11-25

## 2021-11-18 ASSESSMENT — ENCOUNTER SYMPTOMS
VOMITING: 0
ABDOMINAL PAIN: 0
SINUS PAIN: 0
DIARRHEA: 0
NAUSEA: 0
SINUS PRESSURE: 0
COLOR CHANGE: 0
APNEA: 0
SHORTNESS OF BREATH: 0
RHINORRHEA: 1
COUGH: 1
CHEST TIGHTNESS: 0

## 2021-11-18 ASSESSMENT — PATIENT HEALTH QUESTIONNAIRE - PHQ9
SUM OF ALL RESPONSES TO PHQ QUESTIONS 1-9: 0
SUM OF ALL RESPONSES TO PHQ QUESTIONS 1-9: 0
2. FEELING DOWN, DEPRESSED OR HOPELESS: 0
SUM OF ALL RESPONSES TO PHQ9 QUESTIONS 1 & 2: 0
SUM OF ALL RESPONSES TO PHQ QUESTIONS 1-9: 0
1. LITTLE INTEREST OR PLEASURE IN DOING THINGS: 0

## 2021-11-18 NOTE — TELEPHONE ENCOUNTER
I went to the walk in clinic on Monday. I was seen because I have been sick since last Thursday. I have pressure in my ears headache congestion and dizziness. They tested me for covid and it was negative but didnt treat what I assume to be a sinus infection. Is there anyway to get some meds to help me get rid of this or do I need to be seen again somewhere else? I have been taking mucinex dm and Advil cold and sinus and its draining my nose a bit but I cant seem to get the stuff out of my throat. I feel like Im constantly swallowing snot and nothing is helping the headache and pressure.      Please advise

## 2021-11-18 NOTE — PROGRESS NOTES
2021    TELEHEALTH EVALUATION -- Audio/Visual (During QKMJE-22 public health emergency)    HPI:    [address] (:  1990) has requested an audio/video evaluation for the following concern(s):    Steve Salas presents via video visit this morning for c/o worsening sinus pain/pressure, nasal congestion, frontal headaches. Recently went to the walk in clinic and was tested as negative (-) for COVID. Denies any fevers, chills, or known sick contacts. Has been taking advil and Mucine DM with no improvement in symptoms. Symptoms have been present for 10-14 days. Review of Systems   Constitutional: Negative for activity change, appetite change, fatigue and fever. HENT: Positive for congestion and rhinorrhea. Negative for nosebleeds, sinus pressure and sinus pain. Respiratory: Positive for cough. Negative for apnea, chest tightness and shortness of breath. Cardiovascular: Negative for chest pain and palpitations. Gastrointestinal: Negative for abdominal pain, diarrhea, nausea and vomiting. Genitourinary: Negative for difficulty urinating, flank pain and hematuria. Musculoskeletal: Negative for arthralgias, joint swelling and myalgias. Skin: Negative for color change and rash. Neurological: Negative for dizziness, light-headedness and headaches. Psychiatric/Behavioral: Negative. Negative for behavioral problems. Prior to Visit Medications    Medication Sig Taking?  Authorizing Provider   doxycycline hyclate (VIBRA-TABS) 100 MG tablet Take 1 tablet by mouth 2 times daily for 7 days Yes BURTON Sheffield CNP   predniSONE (DELTASONE) 10 MG tablet Take 4 tablets daily for 2 days, then 3 tablets for 2 days, 2 tablets for 2 days, then 1 tablet for 2 days Yes BURTON Sheffield CNP   levothyroxine (EUTHYROX) 125 MCG tablet Take 1 tablet by mouth once daily Yes BURTON Sheffield CNP   omeprazole (PRILOSEC) 20 MG delayed release capsule Take 1 capsule by mouth twice daily Yes Carine Donohue BURTON Sampson CNP   fluticasone (FLONASE) 50 MCG/ACT nasal spray 1 spray by Nasal route 2 times daily Yes LIAN Ricci HEADACHE RELIEF 250-250-65 MG per tablet TAKE ONE  EVERY 6 HOURS AS NEEDED FOR HEADACHE Yes BURTON Billings CNP   LOVE STOOL SOFTENER 100 MG capsule TAKE 1 CAPSULE BY MOUTH ONCE DAILY AS NEEDED FOR  CONSTIPATION Yes BURTON Billings CNP   Acetaminophen-Caff-Pyrilamine (MIDOL COMPLETE PO) Take 2 tablets by mouth as needed Yes Historical Provider, MD   aspirin-acetaminophen-caffeine (Kayleen Murphy) [phone] MG per tablet Take 1 tablet by mouth every 6 hours as needed for Headaches Yes Historical Provider, MD   omeprazole (PRILOSEC) 20 MG delayed release capsule Take 1 capsule by mouth 2 times daily  BURTON Billings CNP       Social History     Tobacco Use    Smoking status: Never Smoker    Smokeless tobacco: Never Used    Tobacco comment: reviewed 1/22/15   Vaping Use    Vaping Use: Never used   Substance Use Topics    Alcohol use: No    Drug use: No        Allergies   Allergen Reactions    Amoxicillin Hives    Pcn [Penicillins] Hives    Sulfa Antibiotics Other (See Comments)     Face turns red, itches and burns     Benadryl [Diphenhydramine Hcl] Nausea And Vomiting   ,   Past Medical History:   Diagnosis Date    Chest pain     GERD (gastroesophageal reflux disease)     H/O 24 hour EKG monitoring 11/3/09    48 hr holter monitor. Significant for sinus tachycardia episode the fastest of which is at 171 beats per min lasting for 3 min and 17 sec.  H/O cardiovascular stress test 1/8/2015    treadmill    H/O echocardiogram 9/7/2011    EF>55%. Mild concentric left ventricular hypertrophy. Mild MR & TR. Mild mitral annular calcification.  Heart abnormalities     Valve leaking see's Dr Tracey Olivarez History of cardiac monitoring 11/26/14    Event Monitor: Sinus tachy-theresa episodes but not clinically significant.      History of cardiac monitoring 12/01/2017    30 day event monitoring     History of echocardiogram 12/19/2017    Normal left ventricle structure and function. Ejection fraction is visually estimated at >60%. No significant valvular disease noted. No evidence of pericardial effusion. Essentially normal echo.  History of EKG 9/6/2011    Sinus arrhythmia. Tightward axis. Diffuse nonspecific T wave abnormalities.  History of exercise stress test 12/19/2017    treadmill    Palpitations     Palpitations     Postpartum depression     Sinus arrhythmia     Thyroid disease     Underactive thyroid   ,   Past Surgical History:   Procedure Laterality Date    FRACTURE SURGERY  1992    Left elbow/metal plate in elbow    LAPAROSCOPY      lap for cyst on ovary and checked for endometreosis    SHOULDER SURGERY  1995    left    TONSILLECTOMY     ,   Social History     Tobacco Use    Smoking status: Never Smoker    Smokeless tobacco: Never Used    Tobacco comment: reviewed 1/22/15   Vaping Use    Vaping Use: Never used   Substance Use Topics    Alcohol use: No    Drug use: No       PHYSICAL EXAMINATION:  [ INSTRUCTIONS:  \"[x]\" Indicates a positive item  \"[]\" Indicates a negative item  -- DELETE ALL ITEMS NOT EXAMINED]  Vital Signs: (As obtained by patient/caregiver or practitioner observation)    Blood pressure-  Heart rate-    Respiratory rate-    Temperature-  Pulse oximetry-     Constitutional: [x] Appears well-developed and well-nourished [x] No apparent distress      [] Abnormal-   Mental status  [x] Alert and awake  [x] Oriented to person/place/time [x]Able to follow commands      Eyes:  EOM    [x]  Normal  [] Abnormal-  Sclera  [x]  Normal  [] Abnormal -         Discharge [x]  None visible  [] Abnormal -    HENT:   [x] Normocephalic, atraumatic.   [] Abnormal   [x] Mouth/Throat: Mucous membranes are moist.     External Ears [x] Normal  [] Abnormal-     Neck: [x] No visualized mass     Pulmonary/Chest: [x] Respiratory effort normal.  [x] No visualized signs of difficulty breathing or respiratory distress        [] Abnormal-      Musculoskeletal:   [x] Normal gait with no signs of ataxia         [x] Normal range of motion of neck        [] Abnormal-       Neurological:        [x] No Facial Asymmetry (Cranial nerve 7 motor function) (limited exam to video visit)          [x] No gaze palsy        [] Abnormal-         Skin:        [x] No significant exanthematous lesions or discoloration noted on facial skin         [] Abnormal-            Psychiatric:       [x] Normal Affect [x] No Hallucinations        [] Abnormal-     Other pertinent observable physical exam findings-     ASSESSMENT/PLAN:  1. Acute non-recurrent maxillary sinusitis- exam limited in virtual format, however, presentation most c/w Dx. Fortunately, COVID was negative. Start doxy and pred taper as below. Encouraged continue Flonase use and daily sinus rinse. RTO in 1 week if not resolved. - doxycycline hyclate (VIBRA-TABS) 100 MG tablet; Take 1 tablet by mouth 2 times daily for 7 days  Dispense: 14 tablet; Refill: 0  - predniSONE (DELTASONE) 10 MG tablet; Take 4 tablets daily for 2 days, then 3 tablets for 2 days, 2 tablets for 2 days, then 1 tablet for 2 days  Dispense: 20 tablet; Refill: 0      Return if symptoms worsen or fail to improve. Adenike Orellana, was evaluated through a synchronous (real-time) audio-video encounter. The patient (or guardian if applicable) is aware that this is a billable service. Verbal consent to proceed has been obtained within the past 12 months. The visit was conducted pursuant to the emergency declaration under the [address] [place] [address] authority and the [email or web address] and [employer] General Act. Patient identification was verified, and a caregiver was present when appropriate. The patient was located in a state where the provider was credentialed to provide care.     Total time spent on this encounter: 15 minutes    --BURTON Christian - CNP on 11/18/2021 at 11:48 AM    An electronic signature was used to authenticate this note.

## 2022-02-21 DIAGNOSIS — E07.9 THYROID DISEASE: ICD-10-CM

## 2022-02-21 RX ORDER — LEVOTHYROXINE SODIUM 0.12 MG/1
TABLET ORAL
Qty: 30 TABLET | Refills: 2 | Status: SHIPPED | OUTPATIENT
Start: 2022-02-21 | End: 2022-02-25 | Stop reason: SDUPTHER

## 2022-02-25 ENCOUNTER — OFFICE VISIT (OUTPATIENT)
Dept: INTERNAL MEDICINE CLINIC | Age: 32
End: 2022-02-25

## 2022-02-25 VITALS
HEIGHT: 62 IN | HEART RATE: 53 BPM | SYSTOLIC BLOOD PRESSURE: 121 MMHG | OXYGEN SATURATION: 98 % | BODY MASS INDEX: 41.22 KG/M2 | RESPIRATION RATE: 18 BRPM | WEIGHT: 224 LBS | DIASTOLIC BLOOD PRESSURE: 78 MMHG

## 2022-02-25 DIAGNOSIS — Z00.00 ENCOUNTER FOR PREVENTIVE HEALTH EXAMINATION: Primary | ICD-10-CM

## 2022-02-25 DIAGNOSIS — J30.2 SEASONAL ALLERGIES: ICD-10-CM

## 2022-02-25 DIAGNOSIS — Z00.00 ENCOUNTER FOR PREVENTIVE HEALTH EXAMINATION: ICD-10-CM

## 2022-02-25 DIAGNOSIS — K21.9 GASTROESOPHAGEAL REFLUX DISEASE WITHOUT ESOPHAGITIS: ICD-10-CM

## 2022-02-25 DIAGNOSIS — E07.9 THYROID DISEASE: ICD-10-CM

## 2022-02-25 LAB
A/G RATIO: 1.8 (ref 1.1–2.2)
ALBUMIN SERPL-MCNC: 4.4 G/DL (ref 3.4–5)
ALP BLD-CCNC: 76 U/L (ref 40–129)
ALT SERPL-CCNC: 17 U/L (ref 10–40)
ANION GAP SERPL CALCULATED.3IONS-SCNC: 13 MMOL/L (ref 3–16)
AST SERPL-CCNC: 13 U/L (ref 15–37)
BASOPHILS ABSOLUTE: 0 K/UL (ref 0–0.2)
BASOPHILS RELATIVE PERCENT: 1.1 %
BILIRUB SERPL-MCNC: 0.4 MG/DL (ref 0–1)
BUN BLDV-MCNC: 14 MG/DL (ref 7–20)
CALCIUM SERPL-MCNC: 9.7 MG/DL (ref 8.3–10.6)
CHLORIDE BLD-SCNC: 102 MMOL/L (ref 99–110)
CHOLESTEROL, FASTING: 163 MG/DL (ref 0–199)
CO2: 23 MMOL/L (ref 21–32)
CREAT SERPL-MCNC: 0.7 MG/DL (ref 0.6–1.1)
EOSINOPHILS ABSOLUTE: 0.1 K/UL (ref 0–0.6)
EOSINOPHILS RELATIVE PERCENT: 1.4 %
GFR AFRICAN AMERICAN: >60
GFR NON-AFRICAN AMERICAN: >60
GLUCOSE BLD-MCNC: 88 MG/DL (ref 70–99)
HCT VFR BLD CALC: 38.6 % (ref 36–48)
HDLC SERPL-MCNC: 58 MG/DL (ref 40–60)
HEMOGLOBIN: 13 G/DL (ref 12–16)
LDL CHOLESTEROL CALCULATED: 96 MG/DL
LYMPHOCYTES ABSOLUTE: 1.1 K/UL (ref 1–5.1)
LYMPHOCYTES RELATIVE PERCENT: 29 %
MCH RBC QN AUTO: 30.7 PG (ref 26–34)
MCHC RBC AUTO-ENTMCNC: 33.7 G/DL (ref 31–36)
MCV RBC AUTO: 90.9 FL (ref 80–100)
MONOCYTES ABSOLUTE: 0.3 K/UL (ref 0–1.3)
MONOCYTES RELATIVE PERCENT: 8.8 %
NEUTROPHILS ABSOLUTE: 2.3 K/UL (ref 1.7–7.7)
NEUTROPHILS RELATIVE PERCENT: 59.7 %
PDW BLD-RTO: 13.5 % (ref 12.4–15.4)
PLATELET # BLD: 277 K/UL (ref 135–450)
PMV BLD AUTO: 8.4 FL (ref 5–10.5)
POTASSIUM SERPL-SCNC: 4.6 MMOL/L (ref 3.5–5.1)
RBC # BLD: 4.24 M/UL (ref 4–5.2)
SODIUM BLD-SCNC: 138 MMOL/L (ref 136–145)
TOTAL PROTEIN: 6.9 G/DL (ref 6.4–8.2)
TRIGLYCERIDE, FASTING: 47 MG/DL (ref 0–150)
TSH REFLEX: 1.85 UIU/ML (ref 0.27–4.2)
VLDLC SERPL CALC-MCNC: 9 MG/DL
WBC # BLD: 3.8 K/UL (ref 4–11)

## 2022-02-25 PROCEDURE — 36415 COLL VENOUS BLD VENIPUNCTURE: CPT | Performed by: NURSE PRACTITIONER

## 2022-02-25 PROCEDURE — 99395 PREV VISIT EST AGE 18-39: CPT | Performed by: NURSE PRACTITIONER

## 2022-02-25 RX ORDER — LEVOCETIRIZINE DIHYDROCHLORIDE 5 MG/1
5 TABLET, FILM COATED ORAL NIGHTLY
Qty: 30 TABLET | Refills: 2 | Status: SHIPPED | OUTPATIENT
Start: 2022-02-25 | End: 2022-04-20

## 2022-02-25 RX ORDER — LEVOTHYROXINE SODIUM 0.12 MG/1
TABLET ORAL
Qty: 90 TABLET | Refills: 1 | Status: SHIPPED | OUTPATIENT
Start: 2022-02-25 | End: 2022-06-20

## 2022-02-25 ASSESSMENT — PATIENT HEALTH QUESTIONNAIRE - PHQ9
SUM OF ALL RESPONSES TO PHQ9 QUESTIONS 1 & 2: 0
1. LITTLE INTEREST OR PLEASURE IN DOING THINGS: 0
SUM OF ALL RESPONSES TO PHQ QUESTIONS 1-9: 0
DEPRESSION UNABLE TO ASSESS: FUNCTIONAL CAPACITY MOTIVATION LIMITS ACCURACY
SUM OF ALL RESPONSES TO PHQ QUESTIONS 1-9: 0
2. FEELING DOWN, DEPRESSED OR HOPELESS: 0
SUM OF ALL RESPONSES TO PHQ QUESTIONS 1-9: 0
SUM OF ALL RESPONSES TO PHQ QUESTIONS 1-9: 0

## 2022-02-25 ASSESSMENT — ENCOUNTER SYMPTOMS
ABDOMINAL PAIN: 0
SINUS PRESSURE: 1
COLOR CHANGE: 0
COUGH: 0
RHINORRHEA: 1
CHEST TIGHTNESS: 0
NAUSEA: 0
DIARRHEA: 0
SINUS PAIN: 0
VOMITING: 0
APNEA: 0
SHORTNESS OF BREATH: 0

## 2022-02-25 NOTE — PROGRESS NOTES
2022    Moises [address] [place] [address] (:  1990) is a 34 y.o. female, here for a preventive medicine evaluation. Patient Active Problem List   Diagnosis    Fibrocystic breast disease    Thyroid disease    Sinus arrhythmia    Vitamin D deficiency     Patient continues to be compliant with synthroid regiment for her hypothyroidism. Denies c/o fatigue, weight gain or loss, heat or cold intolerance, diarrhea, or other GI symptoms. Lab Results   Component Value Date    TSH 2.09 2018     Patient's reflux well controlled on current medications. Patient denies any blood in stool black stool, heartburn, reflux. Denies issues with frequent coughing or reflux while sleeping. Able to eat and drink appropriately without complications. She continues on Omeprazole 20 mg daily. She continues on Flonase, (also used claritin and Zyrtec, however, these anti-histamines made her drowsy). She continues to have left sided sinus pain/pressure, nasal congestion/stuffiness. Alternates with nasal saline. Review of Systems   Constitutional: Negative for activity change, appetite change, fatigue and fever. HENT: Positive for congestion, postnasal drip, rhinorrhea and sinus pressure. Negative for nosebleeds and sinus pain. Respiratory: Negative for apnea, cough, chest tightness and shortness of breath. Cardiovascular: Negative for chest pain and palpitations. Gastrointestinal: Negative for abdominal pain, diarrhea, nausea and vomiting. Genitourinary: Negative for difficulty urinating, flank pain and hematuria. Musculoskeletal: Negative for arthralgias, joint swelling and myalgias. Skin: Negative for color change and rash. Neurological: Negative for dizziness, light-headedness and headaches. Psychiatric/Behavioral: Negative. Negative for behavioral problems. Prior to Visit Medications    Medication Sig Taking?  Authorizing Provider   levocetirizine (XYZAL ALLERGY 24HR) 5 MG tablet Take 1 tablet by mouth nightly Yes BURTON Love CNP   levothyroxine (EUTHYROX) 125 MCG tablet Take 1 tablet by mouth once daily Yes BURTON Love CNP   omeprazole (PRILOSEC) 20 MG delayed release capsule Take 1 capsule by mouth twice daily Yes BURTON Love CNP   fluticasone (FLONASE) 50 MCG/ACT nasal spray 1 spray by Nasal route 2 times daily Yes LIAN Cardenas HEADACHE RELIEF 250-250-65 MG per tablet TAKE ONE  EVERY 6 HOURS AS NEEDED FOR HEADACHE Yes BURTON Love CNP   LOVE STOOL SOFTENER 100 MG capsule TAKE 1 CAPSULE BY MOUTH ONCE DAILY AS NEEDED FOR  CONSTIPATION Yes BURTON Love CNP   Acetaminophen-Caff-Pyrilamine (MIDOL COMPLETE PO) Take 2 tablets by mouth as needed Yes Historical Provider, MD   aspirin-acetaminophen-caffeine ([address] [facility]) 720-859-29 MG per tablet Take 1 tablet by mouth every 6 hours as needed for Headaches Yes Historical Provider, MD   omeprazole (PRILOSEC) 20 MG delayed release capsule Take 1 capsule by mouth 2 times daily  BURTON Love CNP        Allergies   Allergen Reactions    Amoxicillin Hives    Pcn [Penicillins] Hives    Sulfa Antibiotics Other (See Comments)     Face turns red, itches and burns     Benadryl [Diphenhydramine Hcl] Nausea And Vomiting       Past Medical History:   Diagnosis Date    Chest pain     GERD (gastroesophageal reflux disease)     H/O 24 hour EKG monitoring 11/3/09    48 hr holter monitor. Significant for sinus tachycardia episode the fastest of which is at 171 beats per min lasting for 3 min and 17 sec.  H/O cardiovascular stress test 1/8/2015    treadmill    H/O echocardiogram 9/7/2011    EF>55%. Mild concentric left ventricular hypertrophy. Mild MR & TR. Mild mitral annular calcification.  Heart abnormalities     Valve leaking see's Dr Eleanora Sandhoff History of cardiac monitoring 11/26/14    Event Monitor: Sinus tachy-theresa episodes but not clinically significant.      History of cardiac monitoring 12/01/2017    30 day event monitoring     History of echocardiogram 12/19/2017    Normal left ventricle structure and function. Ejection fraction is visually estimated at >60%. No significant valvular disease noted. No evidence of pericardial effusion. Essentially normal echo.  History of EKG 9/6/2011    Sinus arrhythmia. Tightward axis. Diffuse nonspecific T wave abnormalities.  History of exercise stress test 12/19/2017    treadmill    Palpitations     Palpitations     Postpartum depression     Sinus arrhythmia     Thyroid disease     Underactive thyroid       Past Surgical History:   Procedure Laterality Date    FRACTURE SURGERY  1992    Left elbow/metal plate in elbow    LAPAROSCOPY      lap for cyst on ovary and checked for endometreosis    SHOULDER SURGERY  1995    left    TONSILLECTOMY         Social History     Socioeconomic History    Marital status: Single     Spouse name: Not on file    Number of children: Not on file    Years of education: Not on file    Highest education level: Not on file   Occupational History    Not on file   Tobacco Use    Smoking status: Never Smoker    Smokeless tobacco: Never Used    Tobacco comment: reviewed 1/22/15   Vaping Use    Vaping Use: Never used   Substance and Sexual Activity    Alcohol use: No    Drug use: No    Sexual activity: Yes     Partners: Male     Comment: single   Other Topics Concern    Not on file   Social History Narrative    Not on file     Social Determinants of Health     Financial Resource Strain:     Difficulty of Paying Living Expenses: Not on file   Food Insecurity:     Worried About Running Out of Food in the Last Year: Not on file    Bianca of Food in the Last Year: Not on file   Transportation Needs:     Lack of Transportation (Medical): Not on file    Lack of Transportation (Non-Medical):  Not on file   Physical Activity:     Days of Exercise per Week: Not on file    Minutes of Exercise per General: She is not in acute distress. Appearance: She is well-developed. She is not diaphoretic. HENT:      Head: Normocephalic and atraumatic. Nose: Nose normal.      Mouth/Throat:      Pharynx: No oropharyngeal exudate. Eyes:      Conjunctiva/sclera: Conjunctivae normal.      Pupils: Pupils are equal, round, and reactive to light. Cardiovascular:      Rate and Rhythm: Normal rate and regular rhythm. Heart sounds: Normal heart sounds. No murmur heard. No friction rub. Pulmonary:      Effort: Pulmonary effort is normal. No respiratory distress. Breath sounds: Normal breath sounds. Abdominal:      General: Bowel sounds are normal.      Palpations: Abdomen is soft. Tenderness: There is no abdominal tenderness. Musculoskeletal:         General: Normal range of motion. Cervical back: Normal range of motion and neck supple. No edema or erythema. No muscular tenderness. Skin:     General: Skin is warm and dry. Capillary Refill: Capillary refill takes less than 2 seconds. Findings: No erythema or rash. Neurological:      Mental Status: She is alert and oriented to person, place, and time. Cranial Nerves: No cranial nerve deficit. Coordination: Coordination normal.      Deep Tendon Reflexes: Reflexes normal.   Psychiatric:         Behavior: Behavior normal.         Thought Content: Thought content normal.         Judgment: Judgment normal.         No flowsheet data found. Lab Results   Component Value Date    CHOL 171 05/02/2019    CHOL 161 04/23/2012    CHOLFAST 180 03/29/2021    TRIG 62 05/02/2019    TRIG 50 04/23/2012    TRIGLYCFAST 63 03/29/2021    HDL 59 03/29/2021    HDL 62 05/02/2019    HDL 59 04/23/2012    LDLCALC 108 03/29/2021    GLUF 70 05/02/2019    GLUCOSE 99 03/29/2021    LABA1C 5.3 10/18/2018       The ASCVD Risk score (Jazmin Cuellar., et al., 2013) failed to calculate for the following reasons:     The 2013 ASCVD risk score is only valid for ages 36 to 78    Immunization History   Administered Date(s) Administered    Tdap (Boostrix, Adacel) 01/15/2012       Health Maintenance   Topic Date Due    COVID-19 Vaccine (1) Never done    Cervical cancer screen  Never done    Flu vaccine (1) Never done    DTaP/Tdap/Td vaccine (2 - Td or Tdap) 01/15/2022    Varicella vaccine (1 of 2 - 2-dose childhood series) 03/18/2022 (Originally 2/13/1991)    Depression Screen  11/18/2022    HIV screen  Completed    Hepatitis A vaccine  Aged Out    Hepatitis B vaccine  Aged Out    Hib vaccine  Aged Out    Meningococcal (ACWY) vaccine  Aged Out    Pneumococcal 0-64 years Vaccine  Aged Out    Hepatitis C screen  Discontinued       Assessment & Plan   Encounter for preventive health examination - no acute concerns on exam; BP and all vitals at goal; check preventative labs as below and will address any concerns. -     Comprehensive Metabolic Panel; Future  -     Lipid, Fasting; Future  -     TSH with Reflex; Future  -     CBC with Auto Differential; Future    Thyroid disease- recheck TSH; will adjust levothyroxine dose as appropriate.   -     TSH with Reflex; Future  -     levothyroxine (EUTHYROX) 125 MCG tablet; Take 1 tablet by mouth once daily, Disp-90 tablet, R-1Normal    Gastroesophageal reflux disease without esophagitis - well controlled; no changes in management at this time. Continue current PPI therapy. Seasonal allergies- epistaxis likely d/t recent temperature/dry air; keep using nasal saline daily; use humidifier in bedroom nightly; also trial Xyzal as she was intolerant of Zyrtec/Claritin. Course of treatment, including any medications, possible imaging, referrals, and follow ups discussed with patient. All risks and benefits and possible side effects discussed with patient who agrees to plan of care and verbalizes understanding. All labs and imaging reviewed. Return in about 6 months (around 8/25/2022).          --BURTON Monzon - CNP

## 2022-03-27 ENCOUNTER — HOSPITAL ENCOUNTER (EMERGENCY)
Age: 32
Discharge: HOME | End: 2022-03-27
Attending: EMERGENCY MEDICINE

## 2022-03-27 ENCOUNTER — APPOINTMENT (OUTPATIENT)
Dept: CT IMAGING | Age: 32
End: 2022-03-27

## 2022-03-27 VITALS
DIASTOLIC BLOOD PRESSURE: 78 MMHG | RESPIRATION RATE: 12 BRPM | BODY MASS INDEX: 41.22 KG/M2 | WEIGHT: 224 LBS | SYSTOLIC BLOOD PRESSURE: 136 MMHG | TEMPERATURE: 98 F | HEART RATE: 72 BPM | HEIGHT: 62 IN | OXYGEN SATURATION: 100 %

## 2022-03-27 DIAGNOSIS — I26.94 MULTIPLE SUBSEGMENTAL PULMONARY EMBOLI WITHOUT ACUTE COR PULMONALE (HCC): Primary | ICD-10-CM

## 2022-03-27 DIAGNOSIS — R07.9 LEFT-SIDED CHEST PAIN: ICD-10-CM

## 2022-03-27 LAB
ALBUMIN SERPL-MCNC: 4.6 GM/DL (ref 3.4–5)
ALP BLD-CCNC: 80 IU/L (ref 40–129)
ALT SERPL-CCNC: 23 U/L (ref 10–40)
ANION GAP SERPL CALCULATED.3IONS-SCNC: 13 MMOL/L (ref 4–16)
AST SERPL-CCNC: 17 IU/L (ref 15–37)
BASOPHILS ABSOLUTE: 0 K/CU MM
BASOPHILS RELATIVE PERCENT: 0.5 % (ref 0–1)
BILIRUB SERPL-MCNC: 0.5 MG/DL (ref 0–1)
BUN BLDV-MCNC: 12 MG/DL (ref 6–23)
CALCIUM SERPL-MCNC: 9.1 MG/DL (ref 8.3–10.6)
CHLORIDE BLD-SCNC: 102 MMOL/L (ref 99–110)
CO2: 25 MMOL/L (ref 21–32)
CREAT SERPL-MCNC: 0.8 MG/DL (ref 0.6–1.1)
D DIMER: 231 NG/ML(DDU)
DIFFERENTIAL TYPE: ABNORMAL
EKG ATRIAL RATE: 67 BPM
EKG DIAGNOSIS: NORMAL
EKG P AXIS: 31 DEGREES
EKG P-R INTERVAL: 136 MS
EKG Q-T INTERVAL: 392 MS
EKG QRS DURATION: 90 MS
EKG QTC CALCULATION (BAZETT): 414 MS
EKG R AXIS: 15 DEGREES
EKG T AXIS: 14 DEGREES
EKG VENTRICULAR RATE: 67 BPM
EOSINOPHILS ABSOLUTE: 0 K/CU MM
EOSINOPHILS RELATIVE PERCENT: 0.5 % (ref 0–3)
GFR AFRICAN AMERICAN: >60 ML/MIN/1.73M2
GFR NON-AFRICAN AMERICAN: >60 ML/MIN/1.73M2
GLUCOSE BLD-MCNC: 93 MG/DL (ref 70–99)
HCT VFR BLD CALC: 42.5 % (ref 37–47)
HEMOGLOBIN: 14 GM/DL (ref 12.5–16)
IMMATURE NEUTROPHIL %: 0 % (ref 0–0.43)
LYMPHOCYTES ABSOLUTE: 1.1 K/CU MM
LYMPHOCYTES RELATIVE PERCENT: 27.1 % (ref 24–44)
MCH RBC QN AUTO: 29.9 PG (ref 27–31)
MCHC RBC AUTO-ENTMCNC: 32.9 % (ref 32–36)
MCV RBC AUTO: 90.8 FL (ref 78–100)
MONOCYTES ABSOLUTE: 0.3 K/CU MM
MONOCYTES RELATIVE PERCENT: 7.6 % (ref 0–4)
NUCLEATED RBC %: 0 %
PDW BLD-RTO: 12.5 % (ref 11.7–14.9)
PLATELET # BLD: 288 K/CU MM (ref 140–440)
PMV BLD AUTO: 9.5 FL (ref 7.5–11.1)
POTASSIUM SERPL-SCNC: 3.8 MMOL/L (ref 3.5–5.1)
RBC # BLD: 4.68 M/CU MM (ref 4.2–5.4)
SARS-COV-2, NAAT: NOT DETECTED
SEGMENTED NEUTROPHILS ABSOLUTE COUNT: 2.7 K/CU MM
SEGMENTED NEUTROPHILS RELATIVE PERCENT: 64.3 % (ref 36–66)
SODIUM BLD-SCNC: 140 MMOL/L (ref 135–145)
SOURCE: NORMAL
TOTAL IMMATURE NEUTOROPHIL: 0 K/CU MM
TOTAL NUCLEATED RBC: 0 K/CU MM
TOTAL PROTEIN: 7.7 GM/DL (ref 6.4–8.2)
TROPONIN T: <0.01 NG/ML
WBC # BLD: 4.2 K/CU MM (ref 4–10.5)

## 2022-03-27 PROCEDURE — 94761 N-INVAS EAR/PLS OXIMETRY MLT: CPT

## 2022-03-27 PROCEDURE — 6370000000 HC RX 637 (ALT 250 FOR IP): Performed by: EMERGENCY MEDICINE

## 2022-03-27 PROCEDURE — 84484 ASSAY OF TROPONIN QUANT: CPT

## 2022-03-27 PROCEDURE — 93010 ELECTROCARDIOGRAM REPORT: CPT | Performed by: INTERNAL MEDICINE

## 2022-03-27 PROCEDURE — 85379 FIBRIN DEGRADATION QUANT: CPT

## 2022-03-27 PROCEDURE — 99284 EMERGENCY DEPT VISIT MOD MDM: CPT

## 2022-03-27 PROCEDURE — 96374 THER/PROPH/DIAG INJ IV PUSH: CPT

## 2022-03-27 PROCEDURE — 6360000004 HC RX CONTRAST MEDICATION: Performed by: EMERGENCY MEDICINE

## 2022-03-27 PROCEDURE — 93005 ELECTROCARDIOGRAM TRACING: CPT | Performed by: EMERGENCY MEDICINE

## 2022-03-27 PROCEDURE — 6360000002 HC RX W HCPCS: Performed by: EMERGENCY MEDICINE

## 2022-03-27 PROCEDURE — 71275 CT ANGIOGRAPHY CHEST: CPT

## 2022-03-27 PROCEDURE — 94640 AIRWAY INHALATION TREATMENT: CPT

## 2022-03-27 PROCEDURE — 85025 COMPLETE CBC W/AUTO DIFF WBC: CPT

## 2022-03-27 PROCEDURE — 80053 COMPREHEN METABOLIC PANEL: CPT

## 2022-03-27 PROCEDURE — 87635 SARS-COV-2 COVID-19 AMP PRB: CPT

## 2022-03-27 RX ORDER — ALBUTEROL SULFATE 90 UG/1
2 AEROSOL, METERED RESPIRATORY (INHALATION) ONCE
Status: COMPLETED | OUTPATIENT
Start: 2022-03-27 | End: 2022-03-27

## 2022-03-27 RX ORDER — HYDROCODONE BITARTRATE AND ACETAMINOPHEN 5; 325 MG/1; MG/1
1 TABLET ORAL EVERY 8 HOURS PRN
Qty: 9 TABLET | Refills: 0 | Status: SHIPPED | OUTPATIENT
Start: 2022-03-27 | End: 2022-03-30

## 2022-03-27 RX ORDER — KETOROLAC TROMETHAMINE 30 MG/ML
30 INJECTION, SOLUTION INTRAMUSCULAR; INTRAVENOUS ONCE
Status: COMPLETED | OUTPATIENT
Start: 2022-03-27 | End: 2022-03-27

## 2022-03-27 RX ORDER — ALBUTEROL SULFATE 90 UG/1
2 AEROSOL, METERED RESPIRATORY (INHALATION) 4 TIMES DAILY PRN
Qty: 54 G | Refills: 1 | Status: SHIPPED | OUTPATIENT
Start: 2022-03-27 | End: 2022-03-30 | Stop reason: SDUPTHER

## 2022-03-27 RX ADMIN — APIXABAN 10 MG: 5 TABLET, FILM COATED ORAL at 12:51

## 2022-03-27 RX ADMIN — IOPAMIDOL 75 ML: 755 INJECTION, SOLUTION INTRAVENOUS at 10:01

## 2022-03-27 RX ADMIN — ALBUTEROL SULFATE 2 PUFF: 90 AEROSOL, METERED RESPIRATORY (INHALATION) at 10:28

## 2022-03-27 RX ADMIN — KETOROLAC TROMETHAMINE 30 MG: 30 INJECTION, SOLUTION INTRAMUSCULAR at 09:22

## 2022-03-27 ASSESSMENT — PAIN SCALES - GENERAL: PAINLEVEL_OUTOF10: 4

## 2022-03-27 NOTE — CARE COORDINATION
CM consult per Dr Christy Narayan to assist with discharge planning.  Pt is Dx w/PE of lung, no insurance and needs to be on Eliquis

## 2022-03-27 NOTE — ED PROVIDER NOTES
Emergency Department Encounter    Patient: Carmen Schroeder  MRN: [id number]  : 1990  Date of Evaluation: 3/28/2022  ED Provider:  Praneeth Byers MD      Triage Chief Complaint:   Chest Pain (after being sick for a week. ) and Shortness of Breath (with movement)      [place]:  Carmen Schroeder is a 34 y.o. female that presents to the emergency department with left-sided chest pain and shortness of breath. Symptoms began last Thursday. Her family has been sick, so she assumed she had head cold and chest cold. She tried a variety of over-the-counter measures including Mucinex. She had a sore throat with some painful swallowing, but there was no difficulty swallowing. She also noted some discomfort in the left ear. The chest discomfort has settled into the left chest, underneath the left breast.  There is some radiation into the mid back. This is pressure-like in quality. Discomfort does not change with exertion or rest.  She reports an occasional dry cough. There have been no fevers or chills, but she has had some hot flashes. Patient denies any recent travel or immobilization. She is not vaccinated for COVID-19 but did have Covid in approximately 2020. Patient reports no other particular provocative or alleviating factors. ROS - see HPI, below listed is current ROS at time of my eval:  CONSTITUTIONAL: As above. EYES: No eye complaints. HENT: As above. Above symptoms have generally improved. RESPIRATORY: As above. CARDIOVASCULAR: No anginal-type chest pain, orthopnea, or edema. GASTROINTESTINAL: No nausea, vomiting, or abdominal pain. GENITOURINARY: No frequency, urgency, or dysuria. No hematuria. MUSCULOSKELETAL: No recent injury. No neck, back, or extremity pain. NEUROLOGICAL: No focal weakness, numbness, or tingling. SKIN: No rashes or other lesions reported. No yellowing of the skin.     Medical history:  Past Medical History:   Diagnosis Date    Chest pain     GERD (gastroesophageal reflux disease)     H/O 24 hour EKG monitoring 11/3/09    48 hr holter monitor. Significant for sinus tachycardia episode the fastest of which is at 171 beats per min lasting for 3 min and 17 sec.  H/O cardiovascular stress test 1/8/2015    treadmill    H/O echocardiogram 9/7/2011    EF>55%. Mild concentric left ventricular hypertrophy. Mild MR & TR. Mild mitral annular calcification.  Heart abnormalities     Valve leaking see's Dr Bhavna Nazario History of cardiac monitoring 11/26/14    Event Monitor: Sinus tachy-theresa episodes but not clinically significant.  History of cardiac monitoring 12/01/2017    30 day event monitoring     History of echocardiogram 12/19/2017    Normal left ventricle structure and function. Ejection fraction is visually estimated at >60%. No significant valvular disease noted. No evidence of pericardial effusion. Essentially normal echo.  History of EKG 9/6/2011    Sinus arrhythmia. Tightward axis. Diffuse nonspecific T wave abnormalities.     History of exercise stress test 12/19/2017    treadmill    Palpitations     Palpitations     Postpartum depression     Sinus arrhythmia     Thyroid disease     Underactive thyroid     Past Surgical History:   Procedure Laterality Date    FRACTURE SURGERY  1992    Left elbow/metal plate in elbow    LAPAROSCOPY      lap for cyst on ovary and checked for endometreosis    SHOULDER SURGERY  1995    left    TONSILLECTOMY       Family History   Problem Relation Age of Onset    Depression Mother     High Cholesterol Mother     Asthma Sister     Depression Sister     High Blood Pressure Sister     Learning Disabilities Sister     Kidney Disease Maternal Aunt     Diabetes Maternal Grandmother     Early Death Maternal Grandmother     Hearing Loss Maternal Grandmother     Heart Disease Maternal Grandmother     High Blood Pressure Maternal Grandmother     Cancer Maternal Grandmother     High Cholesterol Maternal Grandmother     Hypertension Maternal Grandmother     Migraines Maternal Grandmother     Miscarriages / Stillbirths Paternal Grandmother     Thyroid Disease Father     Migraines Sister     Learning Disabilities Sister     Cancer Maternal Grandfather      Social History     Socioeconomic History    Marital status: Single     Spouse name: Not on file    Number of children: Not on file    Years of education: Not on file    Highest education level: Not on file   Occupational History    Not on file   Tobacco Use    Smoking status: Never Smoker    Smokeless tobacco: Never Used    Tobacco comment: reviewed 1/22/15   Vaping Use    Vaping Use: Never used   Substance and Sexual Activity    Alcohol use: No    Drug use: No    Sexual activity: Yes     Partners: Male     Comment: single   Other Topics Concern    Not on file   Social History Narrative    Not on file     Social Determinants of Health     Financial Resource Strain:     Difficulty of Paying Living Expenses: Not on file   Food Insecurity:     Worried About Running Out of Food in the Last Year: Not on file    Bianca of Food in the Last Year: Not on file   Transportation Needs:     Lack of Transportation (Medical): Not on file    Lack of Transportation (Non-Medical):  Not on file   Physical Activity:     Days of Exercise per Week: Not on file    Minutes of Exercise per Session: Not on file   Stress:     Feeling of Stress : Not on file   Social Connections:     Frequency of Communication with Friends and Family: Not on file    Frequency of Social Gatherings with Friends and Family: Not on file    Attends Congregational Services: Not on file    Active Member of Clubs or Organizations: Not on file    Attends Club or Organization Meetings: Not on file    Marital Status: Not on file   Intimate Partner Violence:     Fear of Current or Ex-Partner: Not on file    Emotionally Abused: Not on file    Physically Abused: Not on file   Austyn Guillory Sexually Abused: Not on file   Housing Stability:     Unable to Pay for Housing in the Last Year: Not on file    Number of Places Lived in the Last Year: Not on file    Unstable Housing in the Last Year: Not on file     No current facility-administered medications for this encounter. Current Outpatient Medications   Medication Sig Dispense Refill    apixaban (ELIQUIS) 5 MG TABS tablet Take 2 tablets by mouth 2 times daily for 7 days 28 tablet 0    [START ON 4/3/2022] apixaban (ELIQUIS) 5 MG TABS tablet Take 1 tablet by mouth 2 times daily 180 tablet 1    HYDROcodone-acetaminophen (NORCO) 5-325 MG per tablet Take 1 tablet by mouth every 8 hours as needed for Pain for up to 3 days. Intended supply: 3 days.  Take lowest dose possible to manage pain 9 tablet 0    albuterol sulfate HFA (VENTOLIN HFA) 108 (90 Base) MCG/ACT inhaler Inhale 2 puffs into the lungs 4 times daily as needed for Wheezing With spacer 54 g 1    levocetirizine (XYZAL ALLERGY 24HR) 5 MG tablet Take 1 tablet by mouth nightly 30 tablet 2    levothyroxine (EUTHYROX) 125 MCG tablet Take 1 tablet by mouth once daily 90 tablet 1    omeprazole (PRILOSEC) 20 MG delayed release capsule Take 1 capsule by mouth twice daily 60 capsule 1    fluticasone (FLONASE) 50 MCG/ACT nasal spray 1 spray by Nasal route 2 times daily 1 Bottle 0    EQ HEADACHE RELIEF 250-250-65 MG per tablet TAKE ONE  EVERY 6 HOURS AS NEEDED FOR HEADACHE 90 tablet 3    LOVE STOOL SOFTENER 100 MG capsule TAKE 1 CAPSULE BY MOUTH ONCE DAILY AS NEEDED FOR  CONSTIPATION 30 capsule 3    Acetaminophen-Caff-Pyrilamine (MIDOL COMPLETE PO) Take 2 tablets by mouth as needed      aspirin-acetaminophen-caffeine (EXCEDRIN MIGRAINE) 250-250-65 MG per tablet Take 1 tablet by mouth every 6 hours as needed for Headaches       Allergies   Allergen Reactions    Amoxicillin Hives    Pcn [Penicillins] Hives    Sulfa Antibiotics Other (See Comments)     Face turns red, itches and burns     Benadryl [Diphenhydramine Hcl] Nausea And Vomiting       Nursing Notes Reviewed    Physical Exam:  Triage VS:    ED Triage Vitals [03/27/22 0810]   Enc Vitals Group      BP (!) 141/91      Pulse 64      Resp 16      Temp 98 °F (36.7 °C)      Temp Source Oral      SpO2 99 %      Weight 224 lb (101.6 kg)      Height 5' 2\" (1.575 m)      Head Circumference       Peak Flow       Pain Score       Pain Loc       Pain Edu? Excl. in [address]? My pulse ox interpretation is -normal on room air    GENERAL: Patient is awake, alert, and oriented appropriately. Patient is resting comfortably in a still position on the exam table. Patient speaking in full and complete sentences. Well-nourished and well-developed. HEENT: Normocephalic and atraumatic. Pupils equal, round, and reactive to light. No redness or matting. Bilateral external ears are unremarkable. Tympanic membranes with moderate but symmetric erythema. Slight opacity with no visible effusions. Nasal mucosa is pink without purulence. Oral mucosa is moist and pink. NECK: Supple with normal range of motion. No Kernig's or Brudzinski sign. No JVD. RESPIRATORY: Mildly diminished with symmetric aeration. No respiratory distress. No wheeze, stridor, rales, rhonchi. Chest wall stable and nontender. CARDIOVASCULAR: Regular rate and rhythm. No murmurs, rubs, or gallops. No central or peripheral cyanosis. GASTROINTESTINAL: Soft, nontender, and nondistended. No McBurney's or Carroll's point tenderness. No other focal tenderness. No involuntary guarding, rebound, or rigidity. No mass or pulsatile mass. Bowel sounds normal.  No CVA tendernes. NEUROLOGICAL: Awake, alert and oriented x 3. GCS 15. Cranial nerves III through XII are grossly intact as tested without facial droop or dermatomal paresthesias. Of note, forehead wrinkles are symmetric and intact. Conjugate gaze without entrapment.   No asymmetry of the corners of the mouth or nasolabial folds. No gross motor or cerebellar deficits. BACK/MUSCULOSKELETAL: No asymmetric edema or calf tenderness. No Homans sign or cords. SKIN: Normal tone for ethnicity. Normal turgor and brisk capillary refill peripherally. PSYCHIATRIC: Normal mood. Normal affect. Emergency department course. Patient is brought to bed 26 and assessed and reassessed by me. Prior to initial valuation, orders are placed for medical screening studies including CBC, metabolic panel, troponin, and D-dimer. Triage EKG shows no criteria ST elevation reciprocal changes. After initial evaluation, additional orders are placed for CT of the chest due to a moderately elevated D-dimer. First albuterol MDI treatment is ordered. Ketorolac 30 mg is ordered for generally pleuritic left-sided chest pain. Patient is agreeable to continuing plan. Patient has been assessed and reassessed on several occasions. At approximately [id number], radiology reports small but very worrisome opacities consistent with pulmonary embolism in the left apex and lingula. There is no evidence of infarct or right heart strain. There is no complicating pneumonia. Patient remains generally stable. There has been no respiratory distress, hypoxia, or cyanosis. She continues to saturate at 95% and above on room air. We have begun to discuss options for disposition including hospital versus home management. Patient reports that she does not have health insurance and would prefer not to be admitted to the hospital if at all possible. Case management is consulted to help facilitate obtaining appropriate anticoagulants. We have discussed all available results. Patient is satisfied with evaluation and agreeable to recommendations. Patient has had the opportunity to ask questions, and they have been answered to the best of my ability. Instructions are given to follow-up with primary care provider for reevaluation and further testing.   Very strict return and follow-up instructions are provided. Patient seen during [facility], I did don appropriate PPE during my encounters with the patient, including n95 (when appropriate) mask and eye protection as appropriate.     I have reviewed and interpreted all of the currently available lab results from this visit (if applicable):  Results for orders placed or performed during the hospital encounter of 03/27/22   COVID-19, Rapid    Specimen: Nasopharyngeal   Result Value Ref Range    Source THROAT     SARS-CoV-2, NAAT NOT DETECTED NOT DETECTED   CBC with Auto Differential   Result Value Ref Range    WBC 4.2 4.0 - 10.5 K/CU MM    RBC 4.68 4.2 - 5.4 M/CU MM    Hemoglobin 14.0 12.5 - 16.0 GM/DL    Hematocrit 42.5 37 - 47 %    MCV 90.8 78 - 100 FL    MCH 29.9 27 - 31 PG    MCHC 32.9 32.0 - 36.0 %    RDW 12.5 11.7 - 14.9 %    Platelets 835 903 - 522 K/CU MM    MPV 9.5 7.5 - 11.1 FL    Differential Type AUTOMATED DIFFERENTIAL     Segs Relative 64.3 36 - 66 %    Lymphocytes % 27.1 24 - 44 %    Monocytes % 7.6 (H) 0 - 4 %    Eosinophils % 0.5 0 - 3 %    Basophils % 0.5 0 - 1 %    Segs Absolute 2.7 K/CU MM    Lymphocytes Absolute 1.1 K/CU MM    Monocytes Absolute 0.3 K/CU MM    Eosinophils Absolute 0.0 K/CU MM    Basophils Absolute 0.0 K/CU MM    Nucleated RBC % 0.0 %    Total Nucleated RBC 0.0 K/CU MM    Total Immature Neutrophil 0.00 K/CU MM    Immature Neutrophil % 0.0 0 - 0.43 %   Comprehensive Metabolic Panel w/ Reflex to MG   Result Value Ref Range    Sodium 140 135 - 145 MMOL/L    Potassium 3.8 3.5 - 5.1 MMOL/L    Chloride 102 99 - 110 mMol/L    CO2 25 21 - 32 MMOL/L    BUN 12 6 - 23 MG/DL    CREATININE 0.8 0.6 - 1.1 MG/DL    Glucose 93 70 - 99 MG/DL    Calcium 9.1 8.3 - 10.6 MG/DL    Albumin 4.6 3.4 - 5.0 GM/DL    Total Protein 7.7 6.4 - 8.2 GM/DL    Total Bilirubin 0.5 0.0 - 1.0 MG/DL    ALT 23 10 - 40 U/L    AST 17 15 - 37 IU/L    Alkaline Phosphatase 80 40 - 129 IU/L    GFR Non- >60 >60 mL/min/1.73m2    GFR African American >60 >60 mL/min/1.73m2    Anion Gap 13 4 - 16   Troponin   Result Value Ref Range    Troponin T <0.010 <0.01 NG/ML   D-Dimer, Quantitative   Result Value Ref Range    D-Dimer, Quant 231 (H) <230 NG/mL(DDU)   EKG 12 Lead   Result Value Ref Range    Ventricular Rate 67 BPM    Atrial Rate 67 BPM    P-R Interval 136 ms    QRS Duration 90 ms    Q-T Interval 392 ms    QTc Calculation (Bazett) 414 ms    P Axis 31 degrees    R Axis 15 degrees    T Axis 14 degrees    Diagnosis       Normal sinus rhythm with sinus arrhythmia  Cannot rule out Anterior infarct (cited on or before 06-JUL-2017)  Abnormal ECG  When compared with ECG of 04-FEB-2021 00:43,  Vent. rate has decreased BY  33 BPM  Confirmed by [facility] Ynoi SANTAMARIA ([id number]) on 3/27/2022 7:58:46 PM          Radiographs (if obtained):  Radiologist's Report Reviewed:  CTA PULMONARY W CONTRAST    Result Date: 3/27/2022  EXAMINATION: CTA OF THE CHEST 3/27/2022 10:02 am TECHNIQUE: CTA of the chest was performed after the administration of intravenous contrast.  Multiplanar reformatted images are provided for review. MIP images are provided for review. Dose modulation, iterative reconstruction, and/or weight based adjustment of the mA/kV was utilized to reduce the radiation dose to as low as reasonably achievable. COMPARISON: The previous study performed 07/11/2018.  HISTORY: ORDERING SYSTEM PROVIDED HISTORY: Left-sided chest pain, elevated D-dimer, rule out PE or developing pneumonia TECHNOLOGIST PROVIDED HISTORY: Reason for exam:->Left-sided chest pain, elevated D-dimer, rule out PE or developing pneumonia Decision Support Exception - unselect if not a suspected or confirmed emergency medical condition->Emergency Medical Condition (MA) Reason for Exam: Left-sided chest pain, elevated D-dimer, rule out PE or developing pneumonia Relevant Medical/Surgical History: 75 ml isovyue 370 FINDINGS: Pulmonary Arteries: Evaluation of the pulmonary arteries is slightly limited, secondary to suboptimal opacification by IV contrast.  The contrast density is very similar to that in the aortic arterial system. There are small foci of decreased attenuation identified within the proximal apical segment of the left superior lobar artery (images 70 and 71 on series 5; image 67 on series 602; image is 83 and 84 on series 601). Similar findings are identified involving the proximal lingula artery (images 101 and 106 on series 5; image 71 on series 602; and images 127 and 131 on series 604). No other similar findings are identified elsewhere. The findings are highly suspicious for pulmonary emboli, given the slight limitations of the study. There is mild dilatation of the main pulmonary trunk to 3.3 cm. This can be seen in pulmonary arterial hypertension. Mediastinum: No evidence of mediastinal, hilar, or axillary lymphadenopathy. The heart and pericardium demonstrate no acute abnormality. There is no acute abnormality of the thoracic aorta. The esophagus and trachea are normal in appearance. Lungs/pleura: The lungs are without acute process. No focal consolidation or pulmonary edema. No evidence of pleural effusion or pneumothorax. Again seen is a faint, tiny, ovoid nodular density by the right minor fissure, without significant interval change and indicative of a perifissural lymph node. Upper Abdomen: Limited images of the upper abdomen are unremarkable. Soft Tissues/Bones: No acute bone or soft tissue abnormality. 1.  Slightly suboptimal study, as described above. 2.  Findings highly suspicious for left-sided pulmonary emboli, as described above. 3.  Mild dilatation of the main pulmonary trunk, which can be seen in pulmonary arterial hypertension. The findings were discussed with Dr. Jerri Allen at 10:59 a.m.      EKG:  Twelve-lead EKG obtained at 0813 on 27 March 2022 and interpreted by me in the absence of a cardiologist.  There is no criteria ST elevation or reciprocal change. There are no hyperacute T wave changes. There is no sign of acute ischemia or infarction. This tracing shows a normal sinus rhythm with sinus arrhythmia. Rate and intervals are 67 beats per minute, NH interval 136 milliseconds, QRS duration 90 milliseconds, QTc interval 414 milliseconds, and R axis normal at 15 degrees. There is no acute change compared with the most recent EKG dated February 4, 2021. Medical decision making:  Patient presents to the emergency permit with recent upper respiratory symptoms and cough consistent with respiratory tract infection. There is no focal pneumonia today. Imaging does show small pulmonary emboli on the left. Fortunately, there is no evidence of infarct or right heart strain. There is no hemodynamic instability, fever, hypoxia, cyanosis, or respiratory distress. With shared decision making, initial management from home has been arranged including anticoagulants. Case management has provided assistance with obtaining those medications. There is no intractable vomiting. Procedures: None. Consultations: None. Clinical Impression:  1. Multiple subsegmental pulmonary emboli without acute cor pulmonale (HCC)    2.  Left-sided chest pain      Disposition referral (if applicable):  Jodi Patel, APRN - CNP  [id number] [facility]  [phone]    Schedule an appointment as soon as possible for a visit   For primary care follow-up and continuing management of the lung blood clots    [id number] [facility] Emergency Department  [place] [id number] [phone]  Go to   As needed, If symptoms worsen    Disposition medications (if applicable):  Discharge Medication List as of 3/27/2022  1:30 PM      START taking these medications    Details   !! apixaban (ELIQUIS) 5 MG TABS tablet Take 2 tablets by mouth 2 times daily for 7 days, Disp-28 tablet, R-0Normal !! apixaban (ELIQUIS) 5 MG TABS tablet Take 1 tablet by mouth 2 times daily, Disp-180 tablet, R-1Normal      HYDROcodone-acetaminophen (NORCO) 5-325 MG per tablet Take 1 tablet by mouth every 8 hours as needed for Pain for up to 3 days. Intended supply: 3 days. Take lowest dose possible to manage pain, Disp-9 tablet, R-0Normal      albuterol sulfate HFA (VENTOLIN HFA) 108 (90 Base) MCG/ACT inhaler Inhale 2 puffs into the lungs 4 times daily as needed for Wheezing With spacer, Disp-54 g, R-1Normal       !! - Potential duplicate medications found. Please discuss with provider. ED Provider Disposition Time  DISPOSITION Decision To Discharge 03/27/2022 01:07:30 PM      Comment: Please note this report has been produced using speech recognition software and may contain errors related to that system including errors in grammar, punctuation, and spelling, as well as words and phrases that may be inappropriate. Efforts were made to edit the dictations.         Ashley Reed MD  03/28/22 0700

## 2022-03-28 ENCOUNTER — TELEPHONE (OUTPATIENT)
Dept: INTERNAL MEDICINE CLINIC | Age: 32
End: 2022-03-28

## 2022-03-28 NOTE — TELEPHONE ENCOUNTER
Was seen in E.R yesterday because I have been sick since last Thursday with a cold. I have had chest pain and feeling like I cant breath. They did blood test and ct with contrast. That come back with two small clots in my left lung. They sent me home with blood thinner Eliquis. That is super expensive and Im self pay. Is there any other cheaper form of blood thinner I can be on? How long do I need these? I am taking over the counter Tylenol cold and flu for cold symptoms. I have started coughing up yellow mucus. I was hoping to wake up feeling better today but Im feeling even worse then yesterday.    Please advise

## 2022-03-28 NOTE — TELEPHONE ENCOUNTER
Lets get pt an OV to see me this week. We can provide some samples to start out with and go from there.

## 2022-03-30 ENCOUNTER — OFFICE VISIT (OUTPATIENT)
Dept: INTERNAL MEDICINE CLINIC | Age: 32
End: 2022-03-30

## 2022-03-30 VITALS
OXYGEN SATURATION: 98 % | HEART RATE: 70 BPM | RESPIRATION RATE: 20 BRPM | BODY MASS INDEX: 40.3 KG/M2 | DIASTOLIC BLOOD PRESSURE: 78 MMHG | WEIGHT: 219 LBS | SYSTOLIC BLOOD PRESSURE: 118 MMHG | HEIGHT: 62 IN

## 2022-03-30 DIAGNOSIS — I26.99 ACUTE PULMONARY EMBOLISM WITHOUT ACUTE COR PULMONALE, UNSPECIFIED PULMONARY EMBOLISM TYPE (HCC): Primary | ICD-10-CM

## 2022-03-30 DIAGNOSIS — J45.21 MILD INTERMITTENT ASTHMA WITH EXACERBATION: ICD-10-CM

## 2022-03-30 PROCEDURE — 99214 OFFICE O/P EST MOD 30 MIN: CPT | Performed by: NURSE PRACTITIONER

## 2022-03-30 RX ORDER — PREDNISONE 10 MG/1
TABLET ORAL
Qty: 20 TABLET | Refills: 0 | Status: SHIPPED | OUTPATIENT
Start: 2022-03-30 | End: 2022-04-20

## 2022-03-30 RX ORDER — ALBUTEROL SULFATE 90 UG/1
2 AEROSOL, METERED RESPIRATORY (INHALATION) 4 TIMES DAILY PRN
Qty: 54 G | Refills: 1 | Status: SHIPPED | OUTPATIENT
Start: 2022-03-30 | End: 2022-04-20

## 2022-03-30 RX ORDER — DOCUSATE SODIUM 100 MG/1
100 CAPSULE, LIQUID FILLED ORAL 2 TIMES DAILY
Qty: 30 CAPSULE | Refills: 3 | Status: SHIPPED | OUTPATIENT
Start: 2022-03-30

## 2022-03-30 SDOH — ECONOMIC STABILITY: FOOD INSECURITY: WITHIN THE PAST 12 MONTHS, THE FOOD YOU BOUGHT JUST DIDN'T LAST AND YOU DIDN'T HAVE MONEY TO GET MORE.: NEVER TRUE

## 2022-03-30 SDOH — ECONOMIC STABILITY: FOOD INSECURITY: WITHIN THE PAST 12 MONTHS, YOU WORRIED THAT YOUR FOOD WOULD RUN OUT BEFORE YOU GOT MONEY TO BUY MORE.: NEVER TRUE

## 2022-03-30 ASSESSMENT — ENCOUNTER SYMPTOMS
DIARRHEA: 0
ABDOMINAL PAIN: 0
NAUSEA: 0
COLOR CHANGE: 0
SHORTNESS OF BREATH: 1
VOMITING: 0
SINUS PAIN: 0
SINUS PRESSURE: 0
COUGH: 1
CHEST TIGHTNESS: 0
APNEA: 0

## 2022-03-30 ASSESSMENT — SOCIAL DETERMINANTS OF HEALTH (SDOH): HOW HARD IS IT FOR YOU TO PAY FOR THE VERY BASICS LIKE FOOD, HOUSING, MEDICAL CARE, AND HEATING?: NOT HARD AT ALL

## 2022-03-30 NOTE — PROGRESS NOTES
Masha Ridley  1990 03/30/22    Chief Complaint   Patient presents with    Follow-Up from Hospital     Pt states that she is still not feeling any better.  Otalgia     cough, chest congestion, nasal congestion        SUBJECTIVE:      James Mahoney presents to the office this morning for ER Follow up. In brief, on 3/27/22 she proceeded to [facility] ER after she was having left sided pleuritic like chest pain and SOB for a few days. Initially thought she had a cold, however, after failing OTC remedies was seen. Denies any fevers or chills and rapid COVID was negative, however, her Chest CT did show a likely suspicious left sided pulmonary emboli. Was started on Eliquis 10 mg BID, transitioning to 5 mg which she has been compliant with. However, is concerned d/t cost of medication as she is self pay. She is not a smoker, is not on exogenous hormone therapy and has no hx of active cancer so there is no clear reason as too why this occurred. She does continues to have cough, chest congestion and wheezing. Pt does have hx of asthma. Review of Systems   Constitutional: Negative for activity change, appetite change, fatigue and fever. HENT: Positive for congestion. Negative for nosebleeds, sinus pressure and sinus pain. Respiratory: Positive for cough and shortness of breath. Negative for apnea and chest tightness. Cardiovascular: Positive for chest pain. Negative for palpitations. Gastrointestinal: Negative for abdominal pain, diarrhea, nausea and vomiting. Genitourinary: Negative for difficulty urinating, flank pain and hematuria. Musculoskeletal: Negative for arthralgias, joint swelling and myalgias. Skin: Negative for color change and rash. Neurological: Negative for dizziness, light-headedness and headaches. Psychiatric/Behavioral: Negative. Negative for behavioral problems.        OBJECTIVE:    /78   Pulse 70   Resp 20   Ht 5' 2\" (1.575 m)   Wt 219 lb (99.3 kg)   LMP 03/23/2022 SpO2 98%   BMI 40.06 kg/m²     Physical Exam  Constitutional:       General: She is not in acute distress. Appearance: She is well-developed. She is not diaphoretic. HENT:      Head: Normocephalic and atraumatic. Mouth/Throat:      Pharynx: No oropharyngeal exudate. Cardiovascular:      Rate and Rhythm: Normal rate and regular rhythm. Heart sounds: Normal heart sounds. No murmur heard. No friction rub. Pulmonary:      Effort: Pulmonary effort is normal. No respiratory distress. Breath sounds: Normal breath sounds. Abdominal:      General: Bowel sounds are normal.      Palpations: Abdomen is soft. Tenderness: There is no abdominal tenderness. Musculoskeletal:         General: Normal range of motion. Cervical back: Normal range of motion and neck supple. No edema or erythema. No muscular tenderness. Skin:     General: Skin is warm and dry. Capillary Refill: Capillary refill takes less than 2 seconds. Findings: No erythema or rash. Neurological:      Mental Status: She is alert and oriented to person, place, and time. Coordination: Coordination normal.   Psychiatric:         Thought Content: Thought content normal.         ASSESSMENT:    1. Acute pulmonary embolism without acute cor pulmonale, unspecified pulmonary embolism type ([address]    2. Mild intermittent asthma with exacerbation        PLAN:    Deanna Panda was seen today for follow-up from [facility] and [facility]. Diagnoses and all orders for this visit:    Acute pulmonary embolism without acute cor pulmonale, unspecified pulmonary embolism type ([address]- pt compliant with Eliquis with no side effects noted thus far on [facility] therapy. Cause of her embolus remains unknown as she is not a smoker, not on exogenous hormone therapy, and with no active cancers known to her. Given unprovoked status, will ask Dr Sophy Rodriguez to see patient for hypercoag workup/determine length of AC therapy.    -     Chidi Morris MD, Hematology Oncology, [place]    Mild intermittent asthma with exacerbation- COVID negative; however, other viral source could be causing exacerbation. Start pred taper, PRN rescue added. -     predniSONE (DELTASONE) 10 MG tablet; Take 4 tablets daily for 2 days, then 3 tablets for 2 days, 2 tablets for 2 days, then 1 tablet for 2 days  -     albuterol sulfate HFA (VENTOLIN HFA) 108 (90 Base) MCG/ACT inhaler; Inhale 2 puffs into the lungs 4 times daily as needed for Wheezing With spacer    Course of treatment, including any medications, possible imaging, referrals, and follow ups discussed with patient. All risks and benefits and possible side effects discussed with patient who agrees to plan of care and verbalizes understanding. All labs and imaging reviewed. No flowsheet data found. Return in about 3 months (around 6/30/2022). Ole note this reports has been produced speech recognition software and may contain errors related to that system including errors in grammar, punctuation, and spelling, as well as words and phrases that may be appropriate. If there are any questions or concerns please feel free to contact the dictating provider for clarification.

## 2022-04-20 ENCOUNTER — INITIAL CONSULT (OUTPATIENT)
Dept: ONCOLOGY | Age: 32
End: 2022-04-20

## 2022-04-20 ENCOUNTER — HOSPITAL ENCOUNTER (OUTPATIENT)
Dept: INFUSION THERAPY | Age: 32
Discharge: HOME | End: 2022-04-20

## 2022-04-20 ENCOUNTER — TELEPHONE (OUTPATIENT)
Dept: INTERNAL MEDICINE CLINIC | Age: 32
End: 2022-04-20

## 2022-04-20 VITALS
WEIGHT: 223 LBS | HEART RATE: 77 BPM | DIASTOLIC BLOOD PRESSURE: 61 MMHG | BODY MASS INDEX: 41.04 KG/M2 | SYSTOLIC BLOOD PRESSURE: 127 MMHG | TEMPERATURE: 98.2 F | HEIGHT: 62 IN | OXYGEN SATURATION: 99 % | RESPIRATION RATE: 16 BRPM

## 2022-04-20 DIAGNOSIS — I26.94 MULTIPLE SUBSEGMENTAL PULMONARY EMBOLI WITHOUT ACUTE COR PULMONALE (HCC): ICD-10-CM

## 2022-04-20 LAB
D DIMER: <200 NG/ML(DDU)
HOMOCYSTEINE: 10.6 UMOL/L (ref 0–10)

## 2022-04-20 PROCEDURE — 86147 CARDIOLIPIN ANTIBODY EA IG: CPT

## 2022-04-20 PROCEDURE — 81241 F5 GENE: CPT

## 2022-04-20 PROCEDURE — 36415 COLL VENOUS BLD VENIPUNCTURE: CPT

## 2022-04-20 PROCEDURE — 99204 OFFICE O/P NEW MOD 45 MIN: CPT | Performed by: INTERNAL MEDICINE

## 2022-04-20 PROCEDURE — 85300 ANTITHROMBIN III ACTIVITY: CPT

## 2022-04-20 PROCEDURE — 85379 FIBRIN DEGRADATION QUANT: CPT

## 2022-04-20 PROCEDURE — 85305 CLOT INHIBIT PROT S TOTAL: CPT

## 2022-04-20 PROCEDURE — 85303 CLOT INHIBIT PROT C ACTIVITY: CPT

## 2022-04-20 PROCEDURE — 85613 RUSSELL VIPER VENOM DILUTED: CPT

## 2022-04-20 PROCEDURE — 83090 ASSAY OF HOMOCYSTEINE: CPT

## 2022-04-20 PROCEDURE — 86146 BETA-2 GLYCOPROTEIN ANTIBODY: CPT

## 2022-04-20 PROCEDURE — 81240 F2 GENE: CPT

## 2022-04-20 ASSESSMENT — PATIENT HEALTH QUESTIONNAIRE - PHQ9
2. FEELING DOWN, DEPRESSED OR HOPELESS: 0
SUM OF ALL RESPONSES TO PHQ QUESTIONS 1-9: 0
SUM OF ALL RESPONSES TO PHQ9 QUESTIONS 1 & 2: 0
1. LITTLE INTEREST OR PLEASURE IN DOING THINGS: 0

## 2022-04-20 NOTE — PROGRESS NOTES
MA Rooming Questions  Patient: Jj Oneal  MRN: [id number]    Date: 4/20/2022        NP      5. Did the patient have a depression screening completed today?  Yes    PHQ-9 Total Score: 0 (4/20/2022  2:35 PM)       PHQ-9 Given to (if applicable):               PHQ-9 Score (if applicable):                     [] Positive     [x]  Negative              Does question #9 need addressed (if applicable)                     [] Yes    []  No               Cynthia Power, MA

## 2022-04-20 NOTE — PROGRESS NOTES
Patient Name:  Tawana Du  Patient :  1990  Patient MRN:  [id number]     Primary Oncologist: Vinny Naqvi MD  Referring Provider: BURTON Lock CNP     Date of Service: 2022      Reason for Consult: To evaluate the patient with pulmonary embolism. Chief Complaint:    Chief Complaint   Patient presents with    New Patient     Patient Active Problem List:     Fibrocystic breast disease     Thyroid disease     Sinus arrhythmia     Vitamin D deficiency     Multiple subsegmental pulmonary emboli without acute cor pulmonale (HCC)    HPI:   Cesilia HARSHIL Mercedes is a 58-year-old very pleasant female with medical history significant for hypothyroidism, fibrocystic breast disease and vitamin D deficiency, referred to me on 2022 for evaluation of unprovoked pulmonary embolism. She initially presented to [facility] ED on 3/27/22 with chest pain and SOB. CTA pulmonary done on 3/27/2022 showed slightly suboptimal study. Findings highly suspicious for left-sided pulmonary emboli. Mild dilatation of the main pulmonary trunk, which can be seen in pulmonary arterial hypertension. She was started with eliquis since then. Her tightness of chest has improved some and she still has some chest pain. She denies sedentary life style, prolong immobilization, use of oral birth control. One of her half sister has VTE. She doesn't know much about her father side family history. She was referred to me for further evaluation. Past Medical History:     Significant for  1. Hypothyroidism  2. Fibrocystic breast disease  3. Vitamin D deficiency    Past Surgery History:    Significant for  1. Tonsillectomy  2. Left elbow fracture surgery  3. Left ovarian cyst removal in 2009  4. Bilateral salpingectomy  5. Left shoulder surgery    Social History:   She denies smoking, alcohol drinking or illicit drug abuse.     Family History:    Significant for liver cancer in her maternal grandmother, lung cancer in her maternal grandfather. One of her maternal aunt has kidney cancer. She does not know much about her father's side. One of her sister (same mother/different father) has VTE. Allergies   Allergen Reactions    Amoxicillin Hives    Pcn [Penicillins] Hives    Sulfa Antibiotics Other (See Comments)     Face turns red, itches and burns     Benadryl [Diphenhydramine Hcl] Nausea And Vomiting       Current Outpatient Medications on File Prior to Visit   Medication Sig Dispense Refill    docusate sodium (LOVE STOOL SOFTENER) 100 MG capsule Take 1 capsule by mouth 2 times daily 30 capsule 3    apixaban (ELIQUIS) 5 MG TABS tablet Take 2 tablets by mouth 2 times daily for 7 days 28 tablet 0    levothyroxine (EUTHYROX) 125 MCG tablet Take 1 tablet by mouth once daily 90 tablet 1    omeprazole (PRILOSEC) 20 MG delayed release capsule Take 1 capsule by mouth twice daily 60 capsule 1    [DISCONTINUED] omeprazole (PRILOSEC) 20 MG delayed release capsule Take 1 capsule by mouth 2 times daily 60 capsule 3     No current facility-administered medications on file prior to visit. Review of Systems:  Constitutional:  No weight loss, No fever, No chills, No night sweats. Energy level good. Eyes:  No diplopia, No transient or permanent loss of vision, No scotomata. ENT / Mouth:  No epistaxis, No dysphagia, No hoarseness, No oral ulcers, No gingival bleeding. No sore throat, No postnasal drip, No nasal drip, No mouth pain, No sinus pain, No tinnitus, Normal hearing. Cardiovascular:  Non cardiac chest pain off and on +. No palpitations, No syncope, No upper extremity edema, No lower extremity edema, No calf discomfort. Respiratory:  No cough. No hemoptysis, No pleurisy, No wheezing, No dyspnea. Breast:  No breast mass, No pain, No nipple discharge, No change in size, No change in shape.   Gastrointestinal:  No abdominal pain, No abdominal cramping, No nausea, No vomiting, No constipation, No diarrhea, No hematochezia, No melena, No jaundice, No dyspepsia, No dysphagia. Urinary:  No dysuria, No hematuria, No urinary incontinence. Gynecological:  No vaginal discharge, No suprapubic pain, No abnormal vaginal bleeding. Musculoskeletal:  No muscle pain, No swollen joints, No joint redness, No bone pain, No spine tenderness. Skin:  No rash, No nodules, No pruritus, No lesions. Neurologic:  No confusion, No seizures, No syncope, No tremor, No speech change, No headache, No hiccups, No abnormal gait, No sensory changes, No weakness. Psychiatric:  No depression, No anxiety, Concentration normal.  Endocrine:  No polyuria, No polydipsia, No hot flashes, No thyroid symptoms. Hematologic:  No epistaxis, No gingival bleeding, No petechiae, No ecchymosis. Lymphatic:  No lymphadenopathy, No lymphedema. Allergy / Immunologic:  No eczema, No frequent mucous infections, No frequent respiratory infections, No recurrent urticarial, No frequent skin infections.      Vital Signs: /61 (Site: Right Upper Arm, Position: Sitting, Cuff Size: Large Adult)   Pulse 77   Temp 98.2 °F (36.8 °C) (Infrared)   Resp 16   Ht 5' 2\" (1.575 m)   Wt 223 lb (101.2 kg)   LMP 03/23/2022   SpO2 99%   BMI 40.79 kg/m²      Physical Exam:  CONSTITUTIONAL: awake, alert, cooperative, no apparent distress   EYES: pupils equal, round and reactive to light, sclera clear, normal conjunctiva  ENT: Normocephalic, without obvious abnormality, atraumatic  NECK: supple, symmetrical, no jugular venous distension, no carotid bruits   HEMATOLOGIC/LYMPHATIC: no cervical, supraclavicular or axillary lymphadenopathy   LUNGS: VBS, no wheezes, no increased work of breathing, no rhonchi, clear to auscultation, no crackles,    CARDIOVASCULAR: regular rate and rhythm, normal S1 and S2, no murmur noted  ABDOMEN: normal bowel sounds x 4, soft, non-distended, non-tender, no masses palpated, no hepatosplenomegaly   MUSCULOSKELETAL: full range of motion noted, tone is normal  NEUROLOGIC: awake, alert, oriented to name, place and time. Motor skills grossly intact. SKIN: appears intact, normal skin color, normal texture, normal turgor, no jaundice.    EXTREMITIES: no LE edema, no leg swelling, no cyanosis, no clubbing,       Labs:  Hematology:  Lab Results   Component Value Date    WBC 4.2 03/27/2022    RBC 4.68 03/27/2022    HGB 14.0 03/27/2022    HCT 42.5 03/27/2022    MCV 90.8 03/27/2022    MCH 29.9 03/27/2022    MCHC 32.9 03/27/2022    RDW 12.5 03/27/2022     03/27/2022    MPV 9.5 03/27/2022    SEGSPCT 64.3 03/27/2022    EOSRELPCT 0.5 03/27/2022    BASOPCT 0.5 03/27/2022    LYMPHOPCT 27.1 03/27/2022    MONOPCT 7.6 (H) 03/27/2022    SEGSABS 2.7 03/27/2022    EOSABS 0.0 03/27/2022    BASOSABS 0.0 03/27/2022    LYMPHSABS 1.1 03/27/2022    MONOSABS 0.3 03/27/2022    DIFFTYPE AUTOMATED DIFFERENTIAL 03/27/2022     No results found for: ESR  Chemistry:  Lab Results   Component Value Date     03/27/2022    K 3.8 03/27/2022     03/27/2022    CO2 25 03/27/2022    BUN 12 03/27/2022    CREATININE 0.8 03/27/2022    GLUCOSE 93 03/27/2022    CALCIUM 9.1 03/27/2022    PROT 7.7 03/27/2022    LABALBU 4.6 03/27/2022    BILITOT 0.5 03/27/2022    ALKPHOS 80 03/27/2022    AST 17 03/27/2022    ALT 23 03/27/2022    LABGLOM >60 03/27/2022    GFRAA >60 03/27/2022    AGRATIO 1.8 02/25/2022    GLOB 2.7 03/29/2021     No results found for: MMA, LDH, HOMOCYSTEINE  No components found for: LD  Lab Results   Component Value Date    TSHHS 1.790 09/25/2020    T4FREE 1.66 11/26/2017    FT3 3.2 10/18/2010     Immunology:  Lab Results   Component Value Date    PROT 7.7 03/27/2022     No results found for: Leah Mckeon, KLFLCR  No results found for: B2M  Coagulation Panel:  Lab Results   Component Value Date    PROTIME 11.3 07/10/2018    INR 0.97 07/10/2018    APTT 31.7 07/10/2018    DDIMER 231 (H) 03/27/2022     Anemia Panel:  Lab Results   Component Value Date [id number] 253 10/18/2018    FOLATE 11.21 10/18/2018     Tumor Markers:  No results found for: , CEA, , LABCA2, PSA     Observations:  PHQ-9 Total Score: 0 (4/20/2022  2:35 PM)       Assessment   Unprovoked PE    Plan:  Kolby KAMARASavannah Cannon is a 76-year-old very pleasant female who initially presented to [facility] ED on 3/27/22 with chest pain and SOB. CTA pulmonary done on 3/27/2022 showed slightly suboptimal study. Findings highly suspicious for left-sided pulmonary emboli. Mild dilatation of the main pulmonary trunk, which can be seen in pulmonary arterial hypertension. She was started with eliquis since then. She denies sedentary life style, prolong immobilization, use of oral birth control. One of her half sister has VTE. She doesn't know much about her father side family history. Since she develops unprovoked PE in young age with unsure paternal side family history, I recommend to send for hypercoagulable work ups. If she is negative for hypercoagulable work ups, I will recommend 4 months of anticoagulation therapy. I answered all her questions and concerns for today. I asked her to follow up with primary care physician on regular basis. I will continue to keep you updated on her progress. Thank you for allowing me to participate in the care of this very pleasant patient. Recent imaging and labs were reviewed and discussed with the patient.

## 2022-04-22 ENCOUNTER — CLINICAL DOCUMENTATION (OUTPATIENT)
Dept: ONCOLOGY | Age: 32
End: 2022-04-22

## 2022-04-22 DIAGNOSIS — I26.94 MULTIPLE SUBSEGMENTAL PULMONARY EMBOLI WITHOUT ACUTE COR PULMONALE (HCC): Primary | ICD-10-CM

## 2022-04-24 LAB
BETA 2 GLYCOPROT.1 IGA AB: <10 SAU
BETA 2 GLYCOPROT.1 IGM AB: <10 SMU
BETA-2 GLYCOPROTEIN 1 IGG ANTIBODY: <10 SGU
DILUTE RUSSELL VIPER VENOM TIME: 49 SEC (ref 33–44)
DRVVT 1 TO 1 MIX REFLEX ONLY: 41 SEC (ref 33–44)
DRVVT CONFIRMATION TEST: ABNORMAL RATIO

## 2022-04-25 LAB
ANTICARDIOLIPIN IGA ANTIBODY: <10 APL
ANTICARDIOLIPIN IGG ANTIBODY: <10 GPL
CARDIOLIPIN AB IGM: 12 MPL

## 2022-04-26 LAB
ANTITHROMBIN ACTIVITY: 125 % (ref 76–128)
PROTEIN C ACTIVITY: 128 % (ref 83–168)
PROTEIN S ACTIVITY: 121 % (ref 57–131)

## 2022-04-27 LAB
FACTOR V LEIDEN: NEGATIVE
PROTHROMBIN G20210A MUTATION: NEGATIVE

## 2022-05-12 NOTE — PROGRESS NOTES
Patient Name:  Ishaan Galindo  Patient :  1990  Patient MRN:  [id number]     Primary Oncologist: Best Monge MD  Referring Provider: BURTON Musa CNP     Date of Service: 2022      Chief Complaint:    Chief Complaint   Patient presents with    Follow-up     Patient Active Problem List:     Fibrocystic breast disease     Thyroid disease     Sinus arrhythmia     Vitamin D deficiency     Multiple subsegmental pulmonary emboli without acute cor pulmonale (HCC)    HPI:   Trang Cleaning. Ladonna Thakkar is a 50-year-old very pleasant female with medical history significant for hypothyroidism, fibrocystic breast disease and vitamin D deficiency, referred to me on 2022 for evaluation of unprovoked pulmonary embolism. She initially presented to [facility] ED on 3/27/22 with chest pain and SOB. CTA pulmonary done on 3/27/2022 showed slightly suboptimal study. Findings highly suspicious for left-sided pulmonary emboli. Mild dilatation of the main pulmonary trunk, which can be seen in pulmonary arterial hypertension. She was started with eliquis since then. Her tightness of chest has improved some and she still has some chest pain. She denies sedentary life style, prolong immobilization, use of oral birth control. One of her half sister has VTE. She doesn't know much about her father side family history. All the hypercoagulable work ups done on 22 were normal.    On May 17, 2022, she presented to me for follow-up. She was initially referred to me for evaluation of unprovoked venous thromboembolism. All the hypercoagulable work-ups were within normal range. Since all the hypercoagulable work-ups are normal, I recommend for total 4 months of anticoagulation therapy. She does not have any significant symptoms at today visit. Past Medical History:     Significant for  1. Hypothyroidism  2. Fibrocystic breast disease  3. Vitamin D deficiency    Past Surgery History:    Significant for  1. Tonsillectomy  2. Left elbow fracture surgery  3. Left ovarian cyst removal in April 2009  4. Bilateral salpingectomy  5. Left shoulder surgery    Social History:   She denies smoking, alcohol drinking or illicit drug abuse. Family History:    Significant for liver cancer in her maternal grandmother, lung cancer in her maternal grandfather. One of her maternal aunt has kidney cancer. She does not know much about her father's side. One of her sister (same mother/different father) has VTE. Allergies   Allergen Reactions    Amoxicillin Hives    Pcn [Penicillins] Hives    Sulfa Antibiotics Other (See Comments)     Face turns red, itches and burns     Benadryl [Diphenhydramine Hcl] Nausea And Vomiting     Review of Systems: \"Per interval history; otherwise 10 point ROS is negative. \"  Her energy level is fine and her sleep is good. She doesn't have fever, chills, night sweats, cough, shortness of breath, chest pain, hemoptysis or palpitations. Her bowels and bladder functions are normal. She denies nausea, vomiting, abdominal pain, diarrhea, constipation, dysuria, loss of appetite or weight loss. She doesn't have neuropathy and she denies bleeding issue. She doesn't have any pain in her body. Denies anxiety or depression. The rest of the systems are unremarkable.        Vital Signs: /81 (Site: Left Upper Arm, Position: Sitting, Cuff Size: Large Adult)   Pulse 70   Temp 98 °F (36.7 °C) (Temporal)   Resp 18   Ht 5' 2\" (1.575 m)   Wt 226 lb (102.5 kg)   SpO2 99%   BMI 41.34 kg/m²      Physical Exam:  CONSTITUTIONAL: awake, alert, cooperative, no apparent distress   EYES: pupils equal, round and reactive to light, sclera clear, normal conjunctiva  ENT: Normocephalic, without obvious abnormality, atraumatic  NECK: supple, symmetrical, no jugular venous distension, no carotid bruits   HEMATOLOGIC/LYMPHATIC: no cervical, supraclavicular or axillary lymphadenopathy   LUNGS: VBS, no wheezes, clear to auscultation, no crackles, no increased work of breathing, no rhonchi,    CARDIOVASCULAR: regular rate and rhythm, normal S1 and S2, no murmur noted  ABDOMEN: normal bowel sounds x 4, soft, non-distended, non-tender, no masses palpated, no hepatosplenomegaly   MUSCULOSKELETAL: full range of motion noted, tone is normal  NEUROLOGIC: awake, alert, oriented to name, place and time. Motor skills grossly intact. SKIN: appears intact, normal skin color, normal texture, normal turgor, no jaundice.    EXTREMITIES: no LE edema, no clubbing, no leg swelling, no cyanosis,        Labs:  Hematology:  Lab Results   Component Value Date    WBC 4.2 03/27/2022    RBC 4.68 03/27/2022    HGB 14.0 03/27/2022    HCT 42.5 03/27/2022    MCV 90.8 03/27/2022    MCH 29.9 03/27/2022    MCHC 32.9 03/27/2022    RDW 12.5 03/27/2022     03/27/2022    MPV 9.5 03/27/2022    SEGSPCT 64.3 03/27/2022    EOSRELPCT 0.5 03/27/2022    BASOPCT 0.5 03/27/2022    LYMPHOPCT 27.1 03/27/2022    MONOPCT 7.6 (H) 03/27/2022    SEGSABS 2.7 03/27/2022    EOSABS 0.0 03/27/2022    BASOSABS 0.0 03/27/2022    LYMPHSABS 1.1 03/27/2022    MONOSABS 0.3 03/27/2022    DIFFTYPE AUTOMATED DIFFERENTIAL 03/27/2022     No results found for: ESR  Chemistry:  Lab Results   Component Value Date     03/27/2022    K 3.8 03/27/2022     03/27/2022    CO2 25 03/27/2022    BUN 12 03/27/2022    CREATININE 0.8 03/27/2022    GLUCOSE 93 03/27/2022    CALCIUM 9.1 03/27/2022    PROT 7.7 03/27/2022    LABALBU 4.6 03/27/2022    BILITOT 0.5 03/27/2022    ALKPHOS 80 03/27/2022    AST 17 03/27/2022    ALT 23 03/27/2022    LABGLOM >60 03/27/2022    GFRAA >60 03/27/2022    AGRATIO 1.8 02/25/2022    GLOB 2.7 03/29/2021     Lab Results   Component Value Date    HOMOCYSTEINE 10.6 (H) 04/20/2022     No components found for: LD  Lab Results   Component Value Date    TSHHS 1.790 09/25/2020    T4FREE 1.66 11/26/2017    FT3 3.2 10/18/2010     Immunology:  Lab Results   Component Value Date PROT 7.7 03/27/2022     No results found for: Laila Azevedo, KLFLCR  No results found for: B2M  Coagulation Panel:  Lab Results   Component Value Date    PROTIME 11.3 07/10/2018    INR 0.97 07/10/2018    APTT 31.7 07/10/2018    DDIMER <200 04/20/2022     Anemia Panel:  Lab Results   Component Value Date    [id number] 253 10/18/2018    FOLATE 11.21 10/18/2018     Tumor Markers:  No results found for: , CEA, , LABCA2, PSA     Observations:  No data recorded     Assessment   Unprovoked PE    Plan:  Braxton KAMARASavannah Curry is a 54-year-old very pleasant female who initially presented to [facility] ED on 3/27/22 with chest pain and SOB. CTA pulmonary done on 3/27/2022 showed slightly suboptimal study. Findings highly suspicious for left-sided pulmonary emboli. Mild dilatation of the main pulmonary trunk, which can be seen in pulmonary arterial hypertension. She was started with eliquis since then. She denies sedentary life style, prolong immobilization, use of oral birth control. One of her half sister has VTE. She doesn't know much about her father side family history. All the hypercoagulable work ups done on 4/20/22 were normal.    On May 17, 2022, she presented to me for follow-up. She was initially referred to me for evaluation of unprovoked venous thromboembolism. All the hypercoagulable work-ups were within normal range. Since all the hypercoagulable work-ups are normal, I recommend for total 4 months of anticoagulation therapy. I answered all her questions and concerns for today. I asked her to follow up with primary care physician on regular basis. Recent imaging and labs were reviewed and discussed with the patient.

## 2022-05-13 ENCOUNTER — OFFICE VISIT (OUTPATIENT)
Dept: ONCOLOGY | Age: 32
End: 2022-05-13

## 2022-05-13 ENCOUNTER — HOSPITAL ENCOUNTER (OUTPATIENT)
Dept: INFUSION THERAPY | Age: 32
Discharge: HOME | End: 2022-05-13

## 2022-05-13 VITALS
HEART RATE: 70 BPM | TEMPERATURE: 98 F | HEIGHT: 62 IN | OXYGEN SATURATION: 99 % | WEIGHT: 226 LBS | SYSTOLIC BLOOD PRESSURE: 133 MMHG | DIASTOLIC BLOOD PRESSURE: 81 MMHG | BODY MASS INDEX: 41.59 KG/M2 | RESPIRATION RATE: 18 BRPM

## 2022-05-13 DIAGNOSIS — I26.94 MULTIPLE SUBSEGMENTAL PULMONARY EMBOLI WITHOUT ACUTE COR PULMONALE (HCC): Primary | ICD-10-CM

## 2022-05-13 PROCEDURE — 99213 OFFICE O/P EST LOW 20 MIN: CPT | Performed by: INTERNAL MEDICINE

## 2022-05-13 NOTE — PROGRESS NOTES
MA Rooming Questions  Patient: Alexandre Smith  MRN: [id number]    Date: 5/13/2022        1. Do you have any new issues?   no         2. Do you need any refills on medications?    no    3. Have you had any imaging done since your last visit?   no    4. Have you been hospitalized or seen in the emergency room since your last visit here?   no    5. Did the patient have a depression screening completed today?  No    No data recorded     PHQ-9 Given to (if applicable):               PHQ-9 Score (if applicable):                     [] Positive     []  Negative              Does question #9 need addressed (if applicable)                     [] Yes    []  No               Rohit Fry CMA

## 2022-05-26 ENCOUNTER — OFFICE VISIT (OUTPATIENT)
Dept: INTERNAL MEDICINE CLINIC | Age: 32
End: 2022-05-26

## 2022-05-26 VITALS
DIASTOLIC BLOOD PRESSURE: 82 MMHG | RESPIRATION RATE: 18 BRPM | WEIGHT: 213 LBS | SYSTOLIC BLOOD PRESSURE: 119 MMHG | HEART RATE: 59 BPM | OXYGEN SATURATION: 99 % | HEIGHT: 62 IN | BODY MASS INDEX: 39.2 KG/M2

## 2022-05-26 DIAGNOSIS — R30.0 DYSURIA: Primary | ICD-10-CM

## 2022-05-26 DIAGNOSIS — R30.0 DYSURIA: ICD-10-CM

## 2022-05-26 LAB
BILIRUBIN, POC: NORMAL
BLOOD URINE, POC: NORMAL
CLARITY, POC: CLEAR
COLOR, POC: YELLOW
GLUCOSE URINE, POC: NORMAL
KETONES, POC: NORMAL
LEUKOCYTE EST, POC: NORMAL
NITRITE, POC: NORMAL
PH, POC: 5
PROTEIN, POC: NORMAL
SPECIFIC GRAVITY, POC: 1.03
UROBILINOGEN, POC: 0.2

## 2022-05-26 PROCEDURE — 81002 URINALYSIS NONAUTO W/O SCOPE: CPT | Performed by: NURSE PRACTITIONER

## 2022-05-26 PROCEDURE — 99213 OFFICE O/P EST LOW 20 MIN: CPT | Performed by: NURSE PRACTITIONER

## 2022-05-26 ASSESSMENT — ENCOUNTER SYMPTOMS
SINUS PRESSURE: 0
DIARRHEA: 0
APNEA: 0
COLOR CHANGE: 0
VOMITING: 0
NAUSEA: 0
CHEST TIGHTNESS: 0
ABDOMINAL PAIN: 0
SHORTNESS OF BREATH: 0
COUGH: 0
SINUS PAIN: 0

## 2022-05-26 NOTE — PROGRESS NOTES
Rbuens KAMARA Clos  1990 05/27/22    Chief Complaint   Patient presents with    Dysuria     painful urination, abdominal pain, back pain, nausea sx started 2 days ago        SUBJECTIVE:      Rubens Serra presents to the office this morning for c/o recent issues with dysuria, urinary urgency/frequency, feeling of incomplete emptying, mild lower back and suprapubic pain which has been ongoing over the last 24-48 hours. Denies any flank pain, fevers, chills. POCT UA was benign for any UTI concerns. Review of Systems   Constitutional: Negative for activity change, appetite change, fatigue and fever. HENT: Negative for congestion, nosebleeds, sinus pressure and sinus pain. Respiratory: Negative for apnea, cough, chest tightness and shortness of breath. Cardiovascular: Negative for chest pain and palpitations. Gastrointestinal: Negative for abdominal pain, diarrhea, nausea and vomiting. Genitourinary: Negative for difficulty urinating, flank pain and hematuria. Musculoskeletal: Negative for arthralgias, joint swelling and myalgias. Skin: Negative for color change and rash. Neurological: Negative for dizziness, light-headedness and headaches. Psychiatric/Behavioral: Negative. Negative for behavioral problems. OBJECTIVE:    /82   Pulse 59   Resp 18   Ht 5' 2\" (1.575 m)   Wt 213 lb (96.6 kg)   SpO2 99%   BMI 38.96 kg/m²     Physical Exam  Constitutional:       General: She is not in acute distress. Appearance: She is well-developed. She is not diaphoretic. HENT:      Head: Normocephalic and atraumatic. Cardiovascular:      Rate and Rhythm: Normal rate and regular rhythm. Pulmonary:      Effort: Pulmonary effort is normal.      Breath sounds: Normal breath sounds. Abdominal:      General: Bowel sounds are normal.      Palpations: Abdomen is soft. Tenderness: There is no abdominal tenderness. There is no right CVA tenderness or left CVA tenderness.    Musculoskeletal: General: Normal range of motion. Cervical back: Normal range of motion and neck supple. No edema or erythema. No muscular tenderness. Skin:     General: Skin is warm and dry. Capillary Refill: Capillary refill takes less than 2 seconds. Neurological:      Mental Status: She is alert and oriented to person, place, and time. Coordination: Coordination normal.   Psychiatric:         Thought Content: Thought content normal.         ASSESSMENT:    1. Dysuria        PLAN:    Kolby Diez was seen today for dysuria. Diagnoses and all orders for this visit:    Dysuria- exam benign and POCT UA also benign/low suspicion for UTI. Will send for culture to confirm.   -     POCT Urinalysis no Micro  -     Culture, Urine; Future        No flowsheet data found. Return if symptoms worsen or fail to improve. Pleas note this reports has been produced speech recognition software and may contain errors related to that system including errors in grammar, punctuation, and spelling, as well as words and phrases that may be appropriate. If there are any questions or concerns please feel free to contact the dictating provider for clarification.

## 2022-05-27 LAB — URINE CULTURE, ROUTINE: NORMAL

## 2022-06-20 DIAGNOSIS — E07.9 THYROID DISEASE: ICD-10-CM

## 2022-06-20 RX ORDER — LEVOTHYROXINE SODIUM 0.12 MG/1
TABLET ORAL
Qty: 30 TABLET | Refills: 0 | Status: SHIPPED | OUTPATIENT
Start: 2022-06-20 | End: 2022-07-20

## 2022-07-20 DIAGNOSIS — E07.9 THYROID DISEASE: ICD-10-CM

## 2022-07-20 RX ORDER — LEVOTHYROXINE SODIUM 0.12 MG/1
TABLET ORAL
Qty: 30 TABLET | Refills: 0 | Status: SHIPPED | OUTPATIENT
Start: 2022-07-20 | End: 2022-08-22

## 2022-07-27 ENCOUNTER — OFFICE VISIT (OUTPATIENT)
Dept: ONCOLOGY | Age: 32
End: 2022-07-27

## 2022-07-27 ENCOUNTER — HOSPITAL ENCOUNTER (OUTPATIENT)
Dept: INFUSION THERAPY | Age: 32
End: 2022-07-27

## 2022-07-27 VITALS
HEIGHT: 62 IN | DIASTOLIC BLOOD PRESSURE: 68 MMHG | TEMPERATURE: 97.9 F | HEART RATE: 65 BPM | WEIGHT: 226 LBS | BODY MASS INDEX: 41.59 KG/M2 | SYSTOLIC BLOOD PRESSURE: 125 MMHG | OXYGEN SATURATION: 98 %

## 2022-07-27 DIAGNOSIS — I26.94 MULTIPLE SUBSEGMENTAL PULMONARY EMBOLI WITHOUT ACUTE COR PULMONALE (HCC): Primary | ICD-10-CM

## 2022-07-27 PROCEDURE — 99213 OFFICE O/P EST LOW 20 MIN: CPT | Performed by: INTERNAL MEDICINE

## 2022-07-27 NOTE — PROGRESS NOTES
MA Rooming Questions  Patient: Nohelia Flores  MRN: [id number]    Date: 7/27/2022        1. Do you have any new issues?   no         2. Do you need any refills on medications?    no    3. Have you had any imaging done since your last visit?   no    4. Have you been hospitalized or seen in the emergency room since your last visit here?   no    5. Did the patient have a depression screening completed today?  Yes    PHQ-9 Total Score: 0 (7/27/2022 11:26 AM)       PHQ-9 Given to (if applicable):               PHQ-9 Score (if applicable):                     [] Positive     []  Negative              Does question #9 need addressed (if applicable)                     [] Yes    []  No               Dalton Martin CMA

## 2022-07-27 NOTE — PROGRESS NOTES
Patient Name:  Nohelia Flores  Patient :  1990  Patient MRN:  [id number]     Primary Oncologist: Solitario Sam MD  Referring Provider: BURTON Squires CNP     Date of Service: 2022      Chief Complaint:    Chief Complaint   Patient presents with    Follow-up       Patient Active Problem List:     Fibrocystic breast disease     Thyroid disease     Sinus arrhythmia     Vitamin D deficiency     Multiple subsegmental pulmonary emboli without acute cor pulmonale (HCC)    HPI:   Vicente RosadoSavannah Zheng is a 28-year-old very pleasant female with medical history significant for hypothyroidism, fibrocystic breast disease and vitamin D deficiency, referred to me on 2022 for evaluation of unprovoked pulmonary embolism. She initially presented to [facility] ED on 3/27/22 with chest pain and SOB. CTA pulmonary done on 3/27/2022 showed slightly suboptimal study. Findings highly suspicious for left-sided pulmonary emboli. Mild dilatation of the main pulmonary trunk, which can be seen in pulmonary arterial hypertension. She was started with eliquis since then. Her tightness of chest has improved some and she still has some chest pain. She denies sedentary life style, prolong immobilization, use of oral birth control. One of her half sister has VTE. She doesn't know much about her father side family history. All the hypercoagulable work ups done on 22 were normal.    On 2022, she presented to me for follow-up. She was initially referred to me for evaluation of unprovoked venous thromboembolism. All the hypercoagulable work-ups were within normal range. Since all the hypercoagulable work-ups are normal and she only has mild clot burden only, I recommend for total 4 months of anticoagulation therapy. Since she has been on [facility] [place] therapy for 4 months already, I recommend her to stop eliquis today. I asked her to take baby aspirin daily.  Educate her about signs and symptoms of VTE and I asked her to contact us if she develops any of those signs or symptoms. She does not have any significant symptoms at today visit. Past Medical History:     Significant for  1. Hypothyroidism  2. Fibrocystic breast disease  3. Vitamin D deficiency    Past Surgery History:    Significant for  1. Tonsillectomy  2. Left elbow fracture surgery  3. Left ovarian cyst removal in April 2009  4. Bilateral salpingectomy  5. Left shoulder surgery    Social History:   She denies smoking, alcohol drinking or illicit drug abuse. Family History:    Significant for liver cancer in her maternal grandmother, lung cancer in her maternal grandfather. One of her maternal aunt has kidney cancer. She does not know much about her father's side. One of her sister (same mother/different father) has VTE. Allergies   Allergen Reactions    Amoxicillin Hives    Pcn [Penicillins] Hives    Sulfa Antibiotics Other (See Comments)     Face turns red, itches and burns     Benadryl [Diphenhydramine Hcl] Nausea And Vomiting     Review of Systems: \"Per interval history; otherwise 10 point ROS is negative. \"  Her energy level is good and her sleep is fine. She doesn't have fever, chills, night sweats, cough, shortness of breath, chest pain, hemoptysis or palpitations. Her bowels and bladder functions are normal. She denies nausea, vomiting, abdominal pain, diarrhea, constipation, dysuria, loss of appetite or weight loss. She doesn't have neuropathy and she denies bleeding issue. She doesn't have any pain in her body. No anxiety or depression. The rest of the systems are unremarkable.        Vital Signs: /68 (Site: Left Upper Arm, Position: Sitting, Cuff Size: Large Adult)   Pulse 65   Temp 97.9 °F (36.6 °C) (Temporal)   Ht 5' 2\" (1.575 m)   Wt 226 lb (102.5 kg)   SpO2 98%   BMI 41.34 kg/m²      Physical Exam:  CONSTITUTIONAL: awake, alert, cooperative, no apparent distress   EYES: pupils equal, round and reactive to light, sclera clear, normal conjunctiva  ENT: Normocephalic, without obvious abnormality, atraumatic  NECK: supple, symmetrical, no jugular venous distension, no carotid bruits   HEMATOLOGIC/LYMPHATIC: no cervical, supraclavicular or axillary lymphadenopathy   LUNGS: VBS, no wheezes, clear to auscultation, no crackles, no increased work of breathing, no rhonchi,    CARDIOVASCULAR: regular rate and rhythm, normal S1 and S2, no murmur noted  ABDOMEN: normal bowel sounds x 4, soft, non-distended, non-tender, no masses palpated, no hepatosplenomegaly   MUSCULOSKELETAL: full range of motion noted, tone is normal  NEUROLOGIC: awake, alert, oriented to name, place and time. Motor skills grossly intact. SKIN: appears intact, normal skin color, normal texture, normal turgor, no jaundice.    EXTREMITIES: no clubbing, no leg swelling, no LE edema, no cyanosis,        Labs:  Hematology:  Lab Results   Component Value Date    WBC 4.2 03/27/2022    RBC 4.68 03/27/2022    HGB 14.0 03/27/2022    HCT 42.5 03/27/2022    MCV 90.8 03/27/2022    MCH 29.9 03/27/2022    MCHC 32.9 03/27/2022    RDW 12.5 03/27/2022     03/27/2022    MPV 9.5 03/27/2022    SEGSPCT 64.3 03/27/2022    EOSRELPCT 0.5 03/27/2022    BASOPCT 0.5 03/27/2022    LYMPHOPCT 27.1 03/27/2022    MONOPCT 7.6 (H) 03/27/2022    SEGSABS 2.7 03/27/2022    EOSABS 0.0 03/27/2022    BASOSABS 0.0 03/27/2022    LYMPHSABS 1.1 03/27/2022    MONOSABS 0.3 03/27/2022    DIFFTYPE AUTOMATED DIFFERENTIAL 03/27/2022     No results found for: ESR  Chemistry:  Lab Results   Component Value Date     03/27/2022    K 3.8 03/27/2022     03/27/2022    CO2 25 03/27/2022    BUN 12 03/27/2022    CREATININE 0.8 03/27/2022    GLUCOSE 93 03/27/2022    CALCIUM 9.1 03/27/2022    PROT 7.7 03/27/2022    LABALBU 4.6 03/27/2022    BILITOT 0.5 03/27/2022    ALKPHOS 80 03/27/2022    AST 17 03/27/2022    ALT 23 03/27/2022    LABGLOM >60 03/27/2022    GFRAA >60 03/27/2022    AGRATIO 1.8 02/25/2022 GLOB 2.7 03/29/2021     Lab Results   Component Value Date    HOMOCYSTEINE 10.6 (H) 04/20/2022     No components found for: LD  Lab Results   Component Value Date    TSHHS 1.790 09/25/2020    T4FREE 1.66 11/26/2017    FT3 3.2 10/18/2010     Immunology:  Lab Results   Component Value Date    PROT 7.7 03/27/2022     No results found for: Sumaya Burns, KLFLCR  No results found for: B2M  Coagulation Panel:  Lab Results   Component Value Date    PROTIME 11.3 07/10/2018    INR 0.97 07/10/2018    APTT 31.7 07/10/2018    DDIMER <200 04/20/2022     Anemia Panel:  Lab Results   Component Value Date    DKYKRXLN48 253 10/18/2018    FOLATE 11.21 10/18/2018     Tumor Markers:  No results found for: , CEA, , LABCA2, PSA     Observations:  PHQ-9 Total Score: 0 (7/27/2022 11:26 AM)     Assessment   Unprovoked PE    Plan:  Mary KAMARA. Elin Moncada is a 54-year-old very pleasant female who initially presented to [facility] ED on 3/27/22 with chest pain and SOB. CTA pulmonary done on 3/27/2022 showed slightly suboptimal study. Findings highly suspicious for left-sided pulmonary emboli. Mild dilatation of the main pulmonary trunk, which can be seen in pulmonary arterial hypertension. She was started with eliquis since then. She denies sedentary life style, prolong immobilization, use of oral birth control. One of her half sister has VTE. She doesn't know much about her father side family history. All the hypercoagulable work ups done on 4/20/22 were normal.    On July 27, 2022, she presented to me for follow-up. She was initially referred to me for evaluation of unprovoked venous thromboembolism. All the hypercoagulable work-ups were within normal range. Since all the hypercoagulable work-ups are normal and she only has mild clot burden only, I recommend for total 4 months of anticoagulation therapy. Since she has been on [facility] therapy for 4 months already, I recommend her to stop eliquis today.      I asked her to take baby aspirin daily. Educate her about signs and symptoms of VTE and I asked her to contact us if she develops any of those signs or symptoms. I answered all her questions and concerns for today. Recent imaging and labs were reviewed and discussed with the patient.

## 2022-08-22 DIAGNOSIS — E07.9 THYROID DISEASE: ICD-10-CM

## 2022-08-22 RX ORDER — LEVOTHYROXINE SODIUM 0.12 MG/1
TABLET ORAL
Qty: 30 TABLET | Refills: 1 | Status: SHIPPED | OUTPATIENT
Start: 2022-08-22 | End: 2022-10-19

## 2022-08-25 ENCOUNTER — OFFICE VISIT (OUTPATIENT)
Dept: INTERNAL MEDICINE CLINIC | Age: 32
End: 2022-08-25

## 2022-08-25 VITALS
SYSTOLIC BLOOD PRESSURE: 110 MMHG | HEART RATE: 72 BPM | BODY MASS INDEX: 42.29 KG/M2 | RESPIRATION RATE: 16 BRPM | HEIGHT: 62 IN | WEIGHT: 229.8 LBS | OXYGEN SATURATION: 93 % | DIASTOLIC BLOOD PRESSURE: 68 MMHG

## 2022-08-25 DIAGNOSIS — E07.9 THYROID DISEASE: ICD-10-CM

## 2022-08-25 DIAGNOSIS — I26.99 ACUTE PULMONARY EMBOLISM WITHOUT ACUTE COR PULMONALE, UNSPECIFIED PULMONARY EMBOLISM TYPE (HCC): ICD-10-CM

## 2022-08-25 DIAGNOSIS — J30.2 SEASONAL ALLERGIES: ICD-10-CM

## 2022-08-25 DIAGNOSIS — G47.10 HYPERSOMNOLENCE: Primary | ICD-10-CM

## 2022-08-25 PROCEDURE — 99214 OFFICE O/P EST MOD 30 MIN: CPT | Performed by: NURSE PRACTITIONER

## 2022-08-25 ASSESSMENT — ENCOUNTER SYMPTOMS
SHORTNESS OF BREATH: 0
COUGH: 0
COLOR CHANGE: 0
NAUSEA: 0
APNEA: 0
SINUS PRESSURE: 0
ABDOMINAL PAIN: 0
CHEST TIGHTNESS: 0
DIARRHEA: 0
SINUS PAIN: 0
VOMITING: 0

## 2022-08-25 NOTE — PROGRESS NOTES
(104.2 kg)   SpO2 93%   BMI 42.03 kg/m²     Physical Exam  Constitutional:       General: She is not in acute distress. Appearance: She is well-developed. She is not diaphoretic. HENT:      Head: Normocephalic and atraumatic. Mouth/Throat:      Pharynx: No oropharyngeal exudate. Cardiovascular:      Rate and Rhythm: Normal rate and regular rhythm. Heart sounds: Normal heart sounds. No murmur heard. No friction rub. Pulmonary:      Effort: Pulmonary effort is normal. No respiratory distress. Breath sounds: Normal breath sounds. Abdominal:      General: Bowel sounds are normal.      Palpations: Abdomen is soft. Tenderness: There is no abdominal tenderness. Musculoskeletal:         General: Normal range of motion. Cervical back: Normal range of motion and neck supple. No edema or erythema. No muscular tenderness. Skin:     General: Skin is warm and dry. Capillary Refill: Capillary refill takes less than 2 seconds. Findings: No erythema. Neurological:      Mental Status: She is alert and oriented to person, place, and time. Coordination: Coordination normal.   Psychiatric:         Behavior: Behavior normal.         Thought Content: Thought content normal.       ASSESSMENT:    1. Hypersomnolence    2. Thyroid disease    3. Acute pulmonary embolism without acute cor pulmonale, unspecified pulmonary embolism type ([address]    4. Seasonal allergies        PLAN:    Francisco Andrew was seen today for 6 month follow-up. Diagnoses and all orders for this visit:    Hypersomnolence-given her benign lab work and ongoing symptoms with concerns for excessive snoring and possible apnea, I do have concern for possible DEISY. We will asked sleep center to evaluate patient consultation for formal studies.  -     [address]    Thyroid disease-continue Synthroid at current dose. Recheck TSH and adjust treatment as appropriate.  -     TSH with Reflex;  Future    Acute pulmonary embolism without acute cor pulmonale, unspecified pulmonary embolism type (HCC)-now off of Eliquis per recommendations of hematology. No recurrence or concerns for clotting. Seasonal allergies-controlled. Continue Flonase. Course of treatment, including any medications, possible imaging, referrals, and follow ups discussed with patient. All risks and benefits and possible side effects discussed with patient who agrees to plan of care and verbalizes understanding. All labs and imaging reviewed. No flowsheet data found. Return in about 6 months (around 2/25/2023). Pleas note this reports has been produced speech recognition software and may contain errors related to that system including errors in grammar, punctuation, and spelling, as well as words and phrases that may be appropriate. If there are any questions or concerns please feel free to contact the dictating provider for clarification.

## 2022-09-06 ENCOUNTER — OFFICE VISIT (OUTPATIENT)
Dept: INTERNAL MEDICINE CLINIC | Age: 32
End: 2022-09-06

## 2022-09-06 ENCOUNTER — HOSPITAL ENCOUNTER (OUTPATIENT)
Age: 32
Setting detail: SPECIMEN
Discharge: HOME | End: 2022-09-06

## 2022-09-06 VITALS
SYSTOLIC BLOOD PRESSURE: 108 MMHG | BODY MASS INDEX: 41.77 KG/M2 | OXYGEN SATURATION: 99 % | DIASTOLIC BLOOD PRESSURE: 68 MMHG | HEART RATE: 65 BPM | WEIGHT: 228.4 LBS

## 2022-09-06 DIAGNOSIS — E07.9 THYROID DISEASE: ICD-10-CM

## 2022-09-06 DIAGNOSIS — M79.89 PAIN AND SWELLING OF LOWER EXTREMITY, LEFT: Primary | ICD-10-CM

## 2022-09-06 DIAGNOSIS — M79.605 PAIN AND SWELLING OF LOWER EXTREMITY, LEFT: ICD-10-CM

## 2022-09-06 DIAGNOSIS — M79.89 PAIN AND SWELLING OF LOWER EXTREMITY, LEFT: ICD-10-CM

## 2022-09-06 DIAGNOSIS — M79.605 PAIN AND SWELLING OF LOWER EXTREMITY, LEFT: Primary | ICD-10-CM

## 2022-09-06 LAB
D DIMER: 133 NG/ML(DDU)
TSH HIGH SENSITIVITY: 2.24 UIU/ML (ref 0.27–4.2)

## 2022-09-06 PROCEDURE — 99213 OFFICE O/P EST LOW 20 MIN: CPT | Performed by: NURSE PRACTITIONER

## 2022-09-06 PROCEDURE — 84443 ASSAY THYROID STIM HORMONE: CPT

## 2022-09-06 PROCEDURE — 85379 FIBRIN DEGRADATION QUANT: CPT

## 2022-09-06 ASSESSMENT — ENCOUNTER SYMPTOMS
VOMITING: 0
SINUS PAIN: 0
SINUS PRESSURE: 0
NAUSEA: 0
SHORTNESS OF BREATH: 0
CHEST TIGHTNESS: 0
COLOR CHANGE: 0
APNEA: 0
ABDOMINAL PAIN: 0
DIARRHEA: 0
COUGH: 0

## 2022-09-06 NOTE — PROGRESS NOTES
Alpa KAMARA Clos  1990 09/06/22    Chief Complaint   Patient presents with    Leg Pain       SUBJECTIVE:      Alpa Hutchinson presents to the office this morning for c/o recent issues with left leg pain and swelling. States that one day last week she felt as if her LLE was heavy, swollen and \"like I was walking on a balloon. \" Did have some intermittent pain last week, but this has since subsided. All the hypercoagulable work ups done on 4/20/22 were normal, but does have hx of PE for which she was on Eliquis (stopped on 7/27/22). She does, however, continue on a daily 81 mg ASA. Review of Systems   Constitutional:  Negative for activity change, appetite change, fatigue and fever. HENT:  Negative for congestion, nosebleeds, sinus pressure and sinus pain. Respiratory:  Negative for apnea, cough, chest tightness and shortness of breath. Cardiovascular:  Positive for leg swelling (left, intermittent). Negative for chest pain and palpitations. Gastrointestinal:  Negative for abdominal pain, diarrhea, nausea and vomiting. Genitourinary:  Negative for difficulty urinating, flank pain and hematuria. Musculoskeletal:  Negative for arthralgias, joint swelling and myalgias. Skin:  Negative for color change and rash. Neurological:  Negative for dizziness, light-headedness and headaches. Psychiatric/Behavioral: Negative. Negative for behavioral problems. OBJECTIVE:    /68 (Site: Left Upper Arm, Position: Sitting, Cuff Size: Large Adult)   Pulse 65   Wt 228 lb 6.4 oz (103.6 kg)   LMP 08/10/2022   SpO2 99%   BMI 41.77 kg/m²     Physical Exam  Constitutional:       General: She is not in acute distress. Appearance: She is well-developed. She is not diaphoretic. HENT:      Head: Normocephalic and atraumatic. Cardiovascular:      Rate and Rhythm: Normal rate and regular rhythm. Heart sounds: Normal heart sounds. No murmur heard. No friction rub.    Pulmonary:      Effort: Pulmonary effort is normal. No respiratory distress. Breath sounds: Normal breath sounds. Abdominal:      General: Bowel sounds are normal.      Palpations: Abdomen is soft. Tenderness: There is no abdominal tenderness. Musculoskeletal:         General: Normal range of motion. Cervical back: Normal range of motion and neck supple. No edema or erythema. No muscular tenderness. Skin:     General: Skin is warm and dry. Capillary Refill: Capillary refill takes less than 2 seconds. Neurological:      Mental Status: She is alert and oriented to person, place, and time. Psychiatric:         Thought Content: Thought content normal.       ASSESSMENT:    1. Pain and swelling of lower extremity, left        PLAN:    Li Burks was seen today for leg pain. Diagnoses and all orders for this visit:    Pain and swelling of lower extremity, left-exam overall benign, however given recent PE history and cessation of oral anticoagulants within the last 6 weeks, will check stat D-dimer to rule out possible risk of DVT. If elevated, will arrange for stat imaging of the left lower extremity.  -     D-Dimer, Quantitative; Future      No flowsheet data found. No follow-ups on file. Pleas note this reports has been produced speech recognition software and may contain errors related to that system including errors in grammar, punctuation, and spelling, as well as words and phrases that may be appropriate. If there are any questions or concerns please feel free to contact the dictating provider for clarification.

## 2022-10-06 ENCOUNTER — HOSPITAL ENCOUNTER (OUTPATIENT)
Dept: SLEEP CENTER | Age: 32
Discharge: HOME | End: 2022-10-06

## 2022-10-06 VITALS
SYSTOLIC BLOOD PRESSURE: 120 MMHG | DIASTOLIC BLOOD PRESSURE: 59 MMHG | HEIGHT: 62 IN | HEART RATE: 56 BPM | OXYGEN SATURATION: 97 % | BODY MASS INDEX: 41.96 KG/M2 | WEIGHT: 228 LBS

## 2022-10-06 DIAGNOSIS — G47.33 OSA (OBSTRUCTIVE SLEEP APNEA): ICD-10-CM

## 2022-10-06 DIAGNOSIS — G47.10 HYPERSOMNIA: ICD-10-CM

## 2022-10-06 DIAGNOSIS — E66.01 MORBID OBESITY WITH BMI OF 40.0-44.9, ADULT (HCC): ICD-10-CM

## 2022-10-06 PROCEDURE — 99211 OFF/OP EST MAY X REQ PHY/QHP: CPT

## 2022-10-06 PROCEDURE — 99204 OFFICE O/P NEW MOD 45 MIN: CPT | Performed by: INTERNAL MEDICINE

## 2022-10-06 ASSESSMENT — SLEEP AND FATIGUE QUESTIONNAIRES
HOW LIKELY ARE YOU TO NOD OFF OR FALL ASLEEP WHILE LYING DOWN TO REST IN THE AFTERNOON WHEN CIRCUMSTANCES PERMIT: 3
HOW LIKELY ARE YOU TO NOD OFF OR FALL ASLEEP WHILE SITTING INACTIVE IN A PUBLIC PLACE: 0
HOW LIKELY ARE YOU TO NOD OFF OR FALL ASLEEP WHEN YOU ARE A PASSENGER IN A CAR FOR AN HOUR WITHOUT A BREAK: 0
HOW LIKELY ARE YOU TO NOD OFF OR FALL ASLEEP WHILE SITTING AND READING: 1
ESS TOTAL SCORE: 8
HOW LIKELY ARE YOU TO NOD OFF OR FALL ASLEEP WHILE WATCHING TV: 3
HOW LIKELY ARE YOU TO NOD OFF OR FALL ASLEEP IN A CAR, WHILE STOPPED FOR A FEW MINUTES IN TRAFFIC: 0
HOW LIKELY ARE YOU TO NOD OFF OR FALL ASLEEP WHILE SITTING AND TALKING TO SOMEONE: 0
HOW LIKELY ARE YOU TO NOD OFF OR FALL ASLEEP WHILE SITTING QUIETLY AFTER LUNCH WITHOUT ALCOHOL: 1

## 2022-10-06 NOTE — CONSULTS
Imelda Park MD, Nacho Gandara MD, Joey Watkins MD, [facility]      [address], [address] [facility]   PH: [phone]  F: [phone]     Subjective:     Patient ID: Horace Doe is a 34 y.o. female, referred to the sleep center for   Chief Complaint   Patient presents with    Daytime Sleepiness   .     Referring physician:  BURTON Altamirano CNP     History:has been referred for the DEISY    Symptoms:   [x]  Snoring                                                                    []  Dry Mouth  [x]  Choking                                                                   [x]  Morning Headaches  [x]  Gasping for Air                                                        []  Trouble Falling asleep  [x]  Tired during the daytime                                         []  Trouble Staying Asleep  [x]  Tired when you wake up                                         [x]  Weight Gain in Last 5 Years  [x]  Wake up frequently at night                                    []  Weight Loss in Last 5 Years  []  Shortness Of Breath                                               [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  []  Coughing                                                                []  Smoker (Previous or Current)  []  Chest Pain                                                              [x]  Anxiety  []  Trouble keeping your legs still at night                   [x]  Depression  []  Kicking your legs in your sleep                               []  Insomnia     [] Palpitation  [x]   Stop breathing      []  Other:     Significant Co-morbidities:  []  Congestive Heart Failure     []  COPD         []  Stroke (Past 30 Days)      []  Supplemental Oxygen Usage       []  Cognitive Impairment      []  Neuromuscular Problems  []  Epilepsy/Neurological Disorders           Social History     Socioeconomic History    Marital status:  Spouse name: Not on file    Number of children: Not on file    Years of education: Not on file    Highest education level: Not on file   Occupational History    Not on file   Tobacco Use    Smoking status: Never    Smokeless tobacco: Never    Tobacco comments:     reviewed 1/22/15   Vaping Use    Vaping Use: Never used   Substance and Sexual Activity    Alcohol use: No    Drug use: No    Sexual activity: Yes     Partners: Male     Comment: single   Other Topics Concern    Not on file   Social History Narrative    Not on file     Social Determinants of Health     Financial Resource Strain: Low Risk     Difficulty of Paying Living Expenses: Not hard at all   Food Insecurity: No Food Insecurity    Worried About Running Out of Food in the Last Year: Never true    Ran Out of Food in the Last Year: Never true   Transportation Needs: Not on file   Physical Activity: Not on file   Stress: Not on file   Social Connections: Not on file   Intimate Partner Violence: Not on file   Housing Stability: Not on file       Prior to Admission medications    Medication Sig Start Date End Date Taking?  Authorizing Provider   Acetaminophen (TYLENOL PO) as needed   Yes Historical Provider, MD   levothyroxine (EUTHYROX) 125 MCG tablet Take 1 tablet by mouth once daily 8/22/22  Yes BURTON Ness CNP   docusate sodium (LOVE STOOL SOFTENER) 100 MG capsule Take 1 capsule by mouth 2 times daily 3/30/22  Yes BURTON Ness CNP   omeprazole (PRILOSEC) 20 MG delayed release capsule Take 1 capsule by mouth twice daily 1/31/22  Yes BURTON Ness CNP   omeprazole (PRILOSEC) 20 MG delayed release capsule Take 1 capsule by mouth 2 times daily 3/20/20   BUROTN Ness CNP       Allergies as of 10/06/2022 - Fully Reviewed 10/06/2022   Allergen Reaction Noted    Amoxicillin Hives 11/22/2011    Pcn [penicillins] Hives 11/22/2011    Sulfa antibiotics Other (See Comments) 11/22/2011    Benadryl [diphenhydramine hcl] Nausea And Vomiting 11/22/2011       Patient Active Problem List   Diagnosis    Fibrocystic breast disease    Thyroid disease    Sinus arrhythmia    Vitamin D deficiency    Multiple subsegmental pulmonary emboli without acute cor pulmonale (HCC)    DEISY (obstructive sleep apnea)    Hypersomnia    Morbid obesity with BMI of 40.0-44.9, adult [facility])       Past Medical History:   Diagnosis Date    Chest pain     GERD (gastroesophageal reflux disease)     H/O 24 hour EKG monitoring 11/3/09    48 hr holter monitor. Significant for sinus tachycardia episode the fastest of which is at 171 beats per min lasting for 3 min and 17 sec. H/O cardiovascular stress test 1/8/2015    treadmill    H/O echocardiogram 9/7/2011    EF>55%. Mild concentric left ventricular hypertrophy. Mild MR & TR. Mild mitral annular calcification. Heart abnormalities     Valve leaking see's Dr Davi Carballo    History of cardiac monitoring 11/26/14    Event Monitor: Sinus tachy-theresa episodes but not clinically significant. History of cardiac monitoring 12/01/2017    30 day event monitoring     History of echocardiogram 12/19/2017    Normal left ventricle structure and function. Ejection fraction is visually estimated at >60%. No significant valvular disease noted. No evidence of pericardial effusion. Essentially normal echo. History of EKG 9/6/2011    Sinus arrhythmia. Tightward axis. Diffuse nonspecific T wave abnormalities.     History of exercise stress test 12/19/2017    treadmill    Palpitations     Palpitations     Postpartum depression     Sinus arrhythmia     Thyroid disease     Underactive thyroid       Past Surgical History:   Procedure Laterality Date    FRACTURE SURGERY  1992    Left elbow/metal plate in elbow    LAPAROSCOPY      lap for cyst on ovary and checked for endometreosis    Zac Coates    left    TONSILLECTOMY         Family History   Problem Relation Age of Onset    Depression Mother     High Cholesterol Mother     Asthma Sister     Depression Sister     High Blood Pressure Sister     Learning Disabilities Sister     Kidney Disease Maternal Aunt     Diabetes Maternal Grandmother     Early Death Maternal Grandmother     Hearing Loss Maternal Grandmother     Heart Disease Maternal Grandmother     High Blood Pressure Maternal Grandmother     Cancer Maternal Grandmother     High Cholesterol Maternal Grandmother     Hypertension Maternal Grandmother     Migraines Maternal Grandmother     Miscarriages / Stillbirths Paternal Grandmother     Thyroid Disease Father     Migraines Sister     Learning Disabilities Sister     Cancer Maternal Grandfather          Objective:   BP (!) 120/59   Pulse 56   Ht 5' 2\" (1.575 m)   Wt 228 lb (103.4 kg)   SpO2 97%   BMI 41.70 kg/m²   Body mass index is 41.7 kg/m². Sleep Medicine 10/6/2022   Sitting and reading 1   Watching TV 3   Sitting, inactive in a public place (e.g. a theatre or a meeting) 0   As a passenger in a car for an hour without a break 0   Lying down to rest in the afternoon when circumstances permit 3   Sitting and talking to someone 0   Sitting quietly after a lunch without alcohol 1   In a car, while stopped for a few minutes in traffic 0   [place] Sleepiness Score 8   Neck circumference (Inches) 14.25     Mallampati 3    Vitals:    10/06/22 0955   BP: (!) 120/59   Pulse: 56   SpO2: 97%   Weight: 228 lb (103.4 kg)   Height: 5' 2\" (1.575 m)     Neck circumference (Inches): 14.25  Inches  [place] - [place] Sleepiness Score: 8    Gen: No distress. Eyes: PERRL. No sclera icterus. No conjunctival injection. ENT: No discharge. Pharynx clear. External appearance of ears and nose normal.Mild OVERJET  Neck: Trachea midline. No obvious mass. Resp: No accessory muscle use. No crackles. No wheezes. No rhonchi. No dullness on percussion. CV: Regular rate. Regular rhythm. No murmur or rub. No edema. GI: Non-tender. Non-distended. No hernia. Skin: Warm, dry, normal texture and turgor.  No nodule on exposed extremities. Lymph: No cervical LAD. No supraclavicular LAD. M/S: No cyanosis. No clubbing. No joint deformity. Psych: Oriented x 3. No anxiety. Awake. Alert. Intact judgement and insight. Mallampati Airway Classification:   []1 []2 [x]3 []4        Assessment and Plan     Diagnosis:    Problem List          Respiratory    DEISY (obstructive sleep apnea)      She has symptoms of DEISY  Advised to go for the HST  Loose weight         Relevant Orders    Home Sleep Study       Other    Hypersomnia      She has symptoms of DEISY  Advised to go for the HST  Loose weight           Morbid obesity with BMI of 40.0-44.9, adult ([facility] [address]      Advised to loose weight with diet and exercise                    Additional Plan:     [x]  Sleep hygiene/ relaxation methods & CBTi principles review with patient   [x]  Avoid supine/back sleep until sleep study   [x]  Driving precautions   [x]  Medical consequences of untreated DEISY   [x]  Weight loss recommendations   [x]  Diet recommendations   [x]  Exercise   []  Advised to quit smoking       []  PFT referral   []  Bariatric Program referral      Follow-Up:    No follow-ups on file.     Electronically signed by Mainor Day MD on 10/6/2022 at 10:16 AM

## 2022-10-16 ENCOUNTER — HOSPITAL ENCOUNTER (EMERGENCY)
Age: 32
Discharge: HOME | End: 2022-10-16

## 2022-10-16 VITALS
OXYGEN SATURATION: 100 % | TEMPERATURE: 97.9 F | RESPIRATION RATE: 19 BRPM | SYSTOLIC BLOOD PRESSURE: 138 MMHG | HEART RATE: 76 BPM | DIASTOLIC BLOOD PRESSURE: 92 MMHG

## 2022-10-16 DIAGNOSIS — M54.50 ACUTE RIGHT-SIDED LOW BACK PAIN WITHOUT SCIATICA: Primary | ICD-10-CM

## 2022-10-16 PROCEDURE — 99283 EMERGENCY DEPT VISIT LOW MDM: CPT | Performed by: PHYSICIAN ASSISTANT

## 2022-10-16 RX ORDER — METHOCARBAMOL 500 MG/1
500 TABLET, FILM COATED ORAL 3 TIMES DAILY
Qty: 15 TABLET | Refills: 0 | Status: SHIPPED | OUTPATIENT
Start: 2022-10-16 | End: 2022-10-18

## 2022-10-16 RX ORDER — METHYLPREDNISOLONE 4 MG/1
TABLET ORAL
Qty: 21 TABLET | Refills: 0 | Status: SHIPPED | OUTPATIENT
Start: 2022-10-16 | End: 2022-10-18

## 2022-10-16 RX ORDER — LIDOCAINE 50 MG/G
1 PATCH TOPICAL DAILY
Qty: 10 PATCH | Refills: 0 | Status: SHIPPED | OUTPATIENT
Start: 2022-10-16

## 2022-10-16 NOTE — ED PROVIDER NOTES
eMERGENCY dEPARTMENT eNCOUnter        [address] [facility] EMERGENCY DEPARTMENT    PCP: BURTON Manuel CNP    CHIEF COMPLAINT    Chief Complaint   Patient presents with    Back Pain       HPI    Zabrina Willis is a 34 y.o. female who presents with back pain, located in the right area of the back. The onset was this past Friday. Context is patient states that she was leaning forward to wipe the tub and when she stood up from forward flexion she felt a \"pop\" and began having pain in her right lower back. States she took a step and started having pain that radiates into the right lateral thigh. States that she has been taking old prescription of Flexeril that prescribed in 2017 for similar back pain but states \"it only makes me feel sleepy. \"  Has been applying heat as well as massage gun as well as stretching and moving her back and pain does not feel improved today compared to Friday. Is concerned she might have sciatica. Prior to ending injury, no previous history for lumbar degenerative disc disease. No dysuria hematuria. No concern for pregnancy. Denying any numbness tingling weakness radiating down the posterior aspect of the right lower leg. No saddle paresthesia, no bowel incontinence or bladder tension. No fever chills, history of IV drug use or diabetes. Pain at this time is a 5/10 ache, worse with rotation of the lower back, ambulation and position changes from moving to sitting standing. REVIEW OF SYSTEMS    Constitutional:  Denies fever, chills, weight loss or weakness. Denies history of IVDA. Cardiovascular:  Denies chest pain, palpitations or swelling  Respiratory:  Denies cough or shortness of breath    GI:  Denies abdominal pain, nausea, vomiting, or diarrhea  :  Denies any urinary symptoms or  vaginal symptoms. Adamantaly denies pregnancy.    Musculoskeletal:  See HPI above   Skin:  Denies rash  Neurologic:   No bowel incontinence or bladder retention, No saddle anesthesia, See HPI. No leg weakness  Endocrine:  Denies polyuria or polydypsia   Lymphatic:  Denies swollen glands     All other review of systems are negative  See HPI and nursing notes for additional information       PAST MEDICAL & SURGICAL HISTORY    Past Medical History:   Diagnosis Date    Chest pain     GERD (gastroesophageal reflux disease)     H/O 24 hour EKG monitoring 11/3/09    48 hr holter monitor. Significant for sinus tachycardia episode the fastest of which is at 171 beats per min lasting for 3 min and 17 sec. H/O cardiovascular stress test 1/8/2015    treadmill    H/O echocardiogram 9/7/2011    EF>55%. Mild concentric left ventricular hypertrophy. Mild MR & TR. Mild mitral annular calcification. Heart abnormalities     Valve leaking see's Dr Maritza Matamoros    History of cardiac monitoring 11/26/14    Event Monitor: Sinus tachy-theresa episodes but not clinically significant. History of cardiac monitoring 12/01/2017    30 day event monitoring     History of echocardiogram 12/19/2017    Normal left ventricle structure and function. Ejection fraction is visually estimated at >60%. No significant valvular disease noted. No evidence of pericardial effusion. Essentially normal echo. History of EKG 9/6/2011    Sinus arrhythmia. Tightward axis. Diffuse nonspecific T wave abnormalities.     History of exercise stress test 12/19/2017    treadmill    Palpitations     Palpitations     Postpartum depression     Sinus arrhythmia     Thyroid disease     Underactive thyroid     Past Surgical History:   Procedure Laterality Date    FRACTURE SURGERY  1992    Left elbow/metal plate in elbow    LAPAROSCOPY      lap for cyst on ovary and checked for endometreosis    SHOULDER SURGERY  1995    left    TONSILLECTOMY         CURRENT MEDICATIONS    Current Outpatient Rx   Medication Sig Dispense Refill    methylPREDNISolone (MEDROL, MAR,) 4 MG tablet Take by mouth as directed on package 21 tablet 0 methocarbamol (ROBAXIN) 500 MG tablet Take 1 tablet by mouth 3 times daily for 5 days 15 tablet 0    lidocaine (LIDODERM) 5 % Place 1 patch onto the skin daily May substitute for lidocaine 4% patch. 10 patch 0    Acetaminophen (TYLENOL PO) as needed      levothyroxine (EUTHYROX) 125 MCG tablet Take 1 tablet by mouth once daily 30 tablet 1    docusate sodium (LOVE STOOL SOFTENER) 100 MG capsule Take 1 capsule by mouth 2 times daily 30 capsule 3    omeprazole (PRILOSEC) 20 MG delayed release capsule Take 1 capsule by mouth twice daily 60 capsule 1       ALLERGIES    Allergies   Allergen Reactions    Amoxicillin Hives    Pcn [Penicillins] Hives    Sulfa Antibiotics Other (See Comments)     Face turns red, itches and burns     Benadryl [Diphenhydramine Hcl] Nausea And Vomiting       SOCIAL HISTORY    Social History     Socioeconomic History    Marital status:    Tobacco Use    Smoking status: Never    Smokeless tobacco: Never    Tobacco comments:     reviewed 1/22/15   Vaping Use    Vaping Use: Never used   Substance and Sexual Activity    Alcohol use: No    Drug use: No    Sexual activity: Yes     Partners: Male     Comment: single     Social Determinants of Health     Financial Resource Strain: Low Risk     Difficulty of Paying Living Expenses: Not hard at all   Food Insecurity: No Food Insecurity    Worried About Running Out of Food in the Last Year: Never true    Ran Out of Food in the Last Year: Never true       PHYSICAL EXAM    VITAL SIGNS: BP (!) 138/92   Pulse 76   Temp 97.9 °F (36.6 °C) (Oral)   Resp 19   SpO2 100%    Patient was noted to be afebrile    Constitutional:  Well developed, elevated BMI. In no acute distress  Head:  Atraumatic,  Normocephalic  Eyes:  No scleral icterus. Conjuctiva clear, No discharge  Neck: No JVD, supple. No enlarged lymph nodes. Trachea midline  Respiratory: Respirations nonlabored  Abdomen:   Bowel sounds normal, Soft, No tenderness, no masses. Musculoskeletal:  No edema, no deformities. No hip tenderness or pain/laxity with pelvic rock  Back:   - No gross deformity, swelling, or discoloration.    - +moderate right lower paralumbar muscle tenderness without masses, fluctuance, warmth, or skin changes.  - No localized midline bony tenderness. No palpable step off, creptitus  - No change in pain with forward flexion  - SLR test negative bilaterally  - ROM limited by pain, especially with right lateral bending, back extension  - No CVA tenderness to percussion  Integument:  Well hydrated, no rash, no pallor. Neurologic:    - No obvious neurological deficits. Patient moves gross gait deficits  - Motor & Sensation intact bilateral lower extremities. - 5/5 strength with dorsi/plantar flexion with resistance  - Patella and Achilles reflexes 2+ bilaterally  - BLE ROM intact with 5/5 strength   - No Drop foot  Vascular:  Distal pulses and capillary refill intact bilateral lower extremities      RADIOLOGY/PROCEDURES    NONE    ED COURSE & MEDICAL DECISION MAKING       Vital signs and nursing notes reviewed during ED course. I have independently evaluated this patient . Supervising MD - Dr. Edilson Mckay - present in the Emergency Department, available for consultation, throughout entirety of  patient care. All pertinent Lab data and radiographic results reviewed with patient at bedside. The patient and/or the family were informed of the results of any tests/labs/imaging, the treatment plan, and time was allotted to answer questions. Differential Diagnosis: Epidural Abscess, Abdominal Aortic Aneurysm, Metastases to back, Cauda Equina Syndrome, Kidney stone, Pyelonephritis, other    Clinical  IMPRESSION    1. Acute right-sided low back pain without sciatica      Patient presents with right lower back pain after bending injury. On exam, pleasant nontoxic 58-year-old female, no acute distress. Ambulating ED with steady gait, no dropfoot.   No midline step-offs or tenderness of the bony lumbar spine. Reproducible tenderness in the right lower paralumbar musculature to palpation as well as range of motion with negative straight leg raise bilaterally. Patient is otherwise neurovascular intact in the lower legs. Equal sensation DTRs and strength. No foot drop. No CVA tenderness. Given mechanism of injury, discussed with patient likely acute lumbar strain versus sprain. No other red flag symptoms/exam  findings or acute neurodeficits concerning for acute cauda equina or epidural/abscess or hematoma requiring additional imaging at this time. She denies any fall or blunt trauma, do not feel that x-rays are indicated. We discussed for symptomatic care and outpatient follow-up with family doctor. Patient is comfortable and agreeable this plan. I estimate there is low risk for rapidly expanding or ruptured AAA, cauda equina syndrome, epidural mass lesion, herniated disc causing severe stenosis, acute renal colic, acute central cord compression, spinal fracture/subluxation, thus I consider the discharge disposition reasonable. At this time, patient is discharged in stable condition with Robaxin, Lidoderm patch and Medrol Dosepak. I have encouraged frequent alternating ice/heat applications to the affected area. May continue activities as tolerated, including gentle range of motion/exercise but encouraged patient to avoid heavy lifting, straining, pulling pushing or repetitive movements until symptoms improve. Patient and I have discussed the symptoms which are most concerning that necessitate immediate return. I recommend followup with primary care provider - call in a.m. Diagnosis and plan discussed in detail with patient who understands and agrees. Patient agrees to return emergency department if symptoms worsen or any new symptoms develop.       Comment: Please note this report has been produced using speech recognition software and may contain errors related to that system including errors in grammar, punctuation, and spelling, as well as words and phrases that may be inappropriate. If there are any questions or concerns please feel free to contact the dictating provider for clarification.          Rick Starr PA-C  10/16/22 1100

## 2022-10-18 ENCOUNTER — OFFICE VISIT (OUTPATIENT)
Dept: INTERNAL MEDICINE CLINIC | Age: 32
End: 2022-10-18

## 2022-10-18 VITALS
DIASTOLIC BLOOD PRESSURE: 78 MMHG | HEIGHT: 62 IN | SYSTOLIC BLOOD PRESSURE: 121 MMHG | WEIGHT: 229 LBS | OXYGEN SATURATION: 99 % | BODY MASS INDEX: 42.14 KG/M2 | RESPIRATION RATE: 16 BRPM | HEART RATE: 78 BPM

## 2022-10-18 DIAGNOSIS — S39.012D STRAIN OF LUMBAR REGION, SUBSEQUENT ENCOUNTER: Primary | ICD-10-CM

## 2022-10-18 LAB
BILIRUBIN, POC: NORMAL
BLOOD URINE, POC: NORMAL
CLARITY, POC: NORMAL
COLOR, POC: NORMAL
GLUCOSE URINE, POC: NORMAL
KETONES, POC: NORMAL
LEUKOCYTE EST, POC: NORMAL
NITRITE, POC: NORMAL
PH, POC: 5
PROTEIN, POC: NORMAL
SPECIFIC GRAVITY, POC: 1.02
UROBILINOGEN, POC: 0.2

## 2022-10-18 PROCEDURE — 99213 OFFICE O/P EST LOW 20 MIN: CPT | Performed by: NURSE PRACTITIONER

## 2022-10-18 PROCEDURE — 81002 URINALYSIS NONAUTO W/O SCOPE: CPT | Performed by: NURSE PRACTITIONER

## 2022-10-18 PROCEDURE — 96372 THER/PROPH/DIAG INJ SC/IM: CPT | Performed by: NURSE PRACTITIONER

## 2022-10-18 RX ORDER — TIZANIDINE 2 MG/1
2 TABLET ORAL 3 TIMES DAILY PRN
Qty: 30 TABLET | Refills: 0 | Status: SHIPPED | OUTPATIENT
Start: 2022-10-18 | End: 2022-11-03 | Stop reason: SINTOL

## 2022-10-18 RX ORDER — TRAMADOL HYDROCHLORIDE 50 MG/1
50 TABLET ORAL EVERY 8 HOURS PRN
Qty: 15 TABLET | Refills: 0 | Status: SHIPPED | OUTPATIENT
Start: 2022-10-18 | End: 2022-10-23

## 2022-10-18 RX ORDER — TRIAMCINOLONE ACETONIDE 40 MG/ML
40 INJECTION, SUSPENSION INTRA-ARTICULAR; INTRAMUSCULAR ONCE
Status: COMPLETED | OUTPATIENT
Start: 2022-10-18 | End: 2022-10-18

## 2022-10-18 RX ADMIN — TRIAMCINOLONE ACETONIDE 40 MG: 40 INJECTION, SUSPENSION INTRA-ARTICULAR; INTRAMUSCULAR at 12:00

## 2022-10-18 ASSESSMENT — ENCOUNTER SYMPTOMS
CHEST TIGHTNESS: 0
COUGH: 0
DIARRHEA: 0
BACK PAIN: 1
VOMITING: 0
ABDOMINAL PAIN: 0
APNEA: 0
NAUSEA: 0
SINUS PRESSURE: 0
SHORTNESS OF BREATH: 0
SINUS PAIN: 0
COLOR CHANGE: 0

## 2022-10-18 NOTE — PROGRESS NOTES
Amanda KAMARA Clos  1990  10/18/22    Chief Complaint   Patient presents with    Back Pain     Pt states that she had been having difficulty having a bowel movement and had to pull her knees to her chest to help evacuate stool. Pt reports that she believes between the severe pushing and cleaning out her bathtub she may have injured her back. Sx started yesterday        SUBJECTIVE:      Patient states that a few days ago she was leaning over to clean her tub and when standing up felt a sudden onset grabbing pain in her lower back. Was seen in the ER at that time with reported benign exam, and sent home with medrol dose pack, robaxin and lidoderm patches (did not start the prednisone because last use of this med caused severe leg cramping). Also states that the same day she was having difficulty having a bowel movement and had to pull her legs up to her chest to strain while defecating and believed this may have worsened issues as well. Denies any loss of bowel/bladder control, saddle anesthesia, or other red flag warning signs. Review of Systems   Constitutional:  Negative for activity change, appetite change, fatigue and fever. HENT:  Negative for congestion, nosebleeds, sinus pressure and sinus pain. Respiratory:  Negative for apnea, cough, chest tightness and shortness of breath. Cardiovascular:  Negative for chest pain and palpitations. Gastrointestinal:  Negative for abdominal pain, diarrhea, nausea and vomiting. Genitourinary:  Negative for difficulty urinating, flank pain and hematuria. Musculoskeletal:  Positive for back pain. Negative for arthralgias, joint swelling and myalgias. Skin:  Negative for color change and rash. Neurological:  Negative for dizziness, light-headedness and headaches. Psychiatric/Behavioral: Negative. Negative for behavioral problems.       OBJECTIVE:    /78   Pulse 78   Resp 16   Ht 5' 2\" (1.575 m)   Wt 229 lb (103.9 kg)   SpO2 99%   BMI 41.88 kg/m²     Physical Exam  Constitutional:       General: She is not in acute distress. Appearance: She is well-developed. She is not diaphoretic. HENT:      Head: Normocephalic and atraumatic. Cardiovascular:      Rate and Rhythm: Normal rate and regular rhythm. Pulmonary:      Effort: Pulmonary effort is normal. No respiratory distress. Breath sounds: Normal breath sounds. Abdominal:      General: Bowel sounds are normal.      Palpations: Abdomen is soft. Tenderness: There is no abdominal tenderness. Musculoskeletal:         General: Normal range of motion. Cervical back: Normal range of motion and neck supple. No edema or erythema. No muscular tenderness. Comments: High sensitivity Neuro Exam for Lumbar spine  -(L1-L2)  inner thigh sensation intact bilaterally  -(S3,4,5) No loss of bowel/bladder control     -Lower extremity ROM:       -L2: Adduct thighs       -L3/S1: extend/ flex knees       -L4: dorsiflex ankles       -L5: point great toes upward & plantar flex toes (S2)       -L2,3,4: Knee reflexes intact bilaterally    (+) SLR, Right   Skin:     General: Skin is warm and dry. Neurological:      Mental Status: She is alert and oriented to person, place, and time. Coordination: Coordination normal.      Deep Tendon Reflexes: Reflexes normal.   Psychiatric:         Thought Content: Thought content normal.       ASSESSMENT:    1. Strain of lumbar region, subsequent encounter        PLAN:    Christina Rod was seen today for back pain. Diagnoses and all orders for this visit:    Strain of lumbar region, subsequent encounter- neuro exam benign; suspect muscular strain/possible sciatic involvement. Mildly positive SLR, R. Provide IM kenalog in office, tizanidine and short course of Tramadol. Start daily stretches which were provided and ice application. If no improvement over next 1-2 weeks, consider PT/possible imaging.  UA benign.   -     POCT Urinalysis no Micro  -     tiZANidine (ZANAFLEX) 2 MG tablet; Take 1 tablet by mouth 3 times daily as needed (back pain)  -     traMADol (ULTRAM) 50 MG tablet; Take 1 tablet by mouth every 8 hours as needed for Pain for up to 5 days. Intended supply: 5 days. Take lowest dose possible to manage pain  -     triamcinolone acetonide (KENALOG-40) injection 40 mg    Course of treatment, including any medications, possible imaging, referrals, and follow ups discussed with patient. All risks and benefits and possible side effects discussed with patient who agrees to plan of care and verbalizes understanding. All labs and imaging reviewed. No flowsheet data found. Return if symptoms worsen or fail to improve. Ole note this reports has been produced speech recognition software and may contain errors related to that system including errors in grammar, punctuation, and spelling, as well as words and phrases that may be appropriate. If there are any questions or concerns please feel free to contact the dictating provider for clarification.

## 2022-10-19 DIAGNOSIS — E07.9 THYROID DISEASE: ICD-10-CM

## 2022-10-19 RX ORDER — LEVOTHYROXINE SODIUM 0.12 MG/1
TABLET ORAL
Qty: 30 TABLET | Refills: 0 | Status: SHIPPED | OUTPATIENT
Start: 2022-10-19

## 2022-10-19 RX ORDER — OMEPRAZOLE 20 MG/1
CAPSULE, DELAYED RELEASE ORAL
Qty: 60 CAPSULE | Refills: 0 | Status: SHIPPED | OUTPATIENT
Start: 2022-10-19

## 2022-10-20 ENCOUNTER — APPOINTMENT (OUTPATIENT)
Dept: CT IMAGING | Age: 32
End: 2022-10-20

## 2022-10-20 ENCOUNTER — APPOINTMENT (OUTPATIENT)
Dept: GENERAL RADIOLOGY | Age: 32
End: 2022-10-20

## 2022-10-20 ENCOUNTER — HOSPITAL ENCOUNTER (EMERGENCY)
Age: 32
Discharge: HOME | End: 2022-10-20

## 2022-10-20 VITALS
TEMPERATURE: 98 F | HEART RATE: 71 BPM | DIASTOLIC BLOOD PRESSURE: 62 MMHG | BODY MASS INDEX: 41.96 KG/M2 | RESPIRATION RATE: 14 BRPM | OXYGEN SATURATION: 100 % | WEIGHT: 228 LBS | SYSTOLIC BLOOD PRESSURE: 125 MMHG | HEIGHT: 62 IN

## 2022-10-20 DIAGNOSIS — M54.50 RIGHT LUMBAR PAIN: Primary | ICD-10-CM

## 2022-10-20 LAB
ALBUMIN SERPL-MCNC: 4.6 GM/DL (ref 3.4–5)
ALP BLD-CCNC: 71 IU/L (ref 40–129)
ALT SERPL-CCNC: 17 U/L (ref 10–40)
ANION GAP SERPL CALCULATED.3IONS-SCNC: 12 MMOL/L (ref 4–16)
AST SERPL-CCNC: 13 IU/L (ref 15–37)
BACTERIA: NEGATIVE /HPF
BASOPHILS ABSOLUTE: 0 K/CU MM
BASOPHILS RELATIVE PERCENT: 0.1 % (ref 0–1)
BILIRUB SERPL-MCNC: 0.5 MG/DL (ref 0–1)
BILIRUBIN URINE: NEGATIVE MG/DL
BLOOD, URINE: NEGATIVE
BUN BLDV-MCNC: 11 MG/DL (ref 6–23)
CALCIUM SERPL-MCNC: 9.4 MG/DL (ref 8.3–10.6)
CHLORIDE BLD-SCNC: 101 MMOL/L (ref 99–110)
CLARITY: ABNORMAL
CO2: 21 MMOL/L (ref 21–32)
COLOR: YELLOW
CREAT SERPL-MCNC: 0.6 MG/DL (ref 0.6–1.1)
DIFFERENTIAL TYPE: ABNORMAL
EOSINOPHILS ABSOLUTE: 0 K/CU MM
EOSINOPHILS RELATIVE PERCENT: 0 % (ref 0–3)
GFR SERPL CREATININE-BSD FRML MDRD: >60 ML/MIN/1.73M2
GLUCOSE BLD-MCNC: 110 MG/DL (ref 70–99)
GLUCOSE, URINE: NEGATIVE MG/DL
HCT VFR BLD CALC: 37.2 % (ref 37–47)
HEMOGLOBIN: 12.6 GM/DL (ref 12.5–16)
IMMATURE NEUTROPHIL %: 0.2 % (ref 0–0.43)
INTERPRETATION: NORMAL
KETONES, URINE: NEGATIVE MG/DL
LEUKOCYTE ESTERASE, URINE: NEGATIVE
LYMPHOCYTES ABSOLUTE: 0.9 K/CU MM
LYMPHOCYTES RELATIVE PERCENT: 10.5 % (ref 24–44)
MCH RBC QN AUTO: 30.4 PG (ref 27–31)
MCHC RBC AUTO-ENTMCNC: 33.9 % (ref 32–36)
MCV RBC AUTO: 89.9 FL (ref 78–100)
MONOCYTES ABSOLUTE: 0.5 K/CU MM
MONOCYTES RELATIVE PERCENT: 5.9 % (ref 0–4)
NITRITE URINE, QUANTITATIVE: NEGATIVE
NUCLEATED RBC %: 0 %
PDW BLD-RTO: 12.6 % (ref 11.7–14.9)
PH, URINE: 6.5 (ref 5–8)
PLATELET # BLD: 303 K/CU MM (ref 140–440)
PMV BLD AUTO: 9.4 FL (ref 7.5–11.1)
POTASSIUM SERPL-SCNC: 3.7 MMOL/L (ref 3.5–5.1)
PREGNANCY, URINE: NEGATIVE
PROTEIN UA: NEGATIVE MG/DL
RBC # BLD: 4.14 M/CU MM (ref 4.2–5.4)
RBC URINE: ABNORMAL /HPF (ref 0–6)
SEGMENTED NEUTROPHILS ABSOLUTE COUNT: 7.4 K/CU MM
SEGMENTED NEUTROPHILS RELATIVE PERCENT: 83.3 % (ref 36–66)
SODIUM BLD-SCNC: 134 MMOL/L (ref 135–145)
SPECIFIC GRAVITY UA: <1.005 (ref 1–1.03)
SPECIFIC GRAVITY, URINE: <1.005 (ref 1–1.03)
SQUAMOUS EPITHELIAL: 10 /HPF
TOTAL IMMATURE NEUTOROPHIL: 0.02 K/CU MM
TOTAL NUCLEATED RBC: 0 K/CU MM
TOTAL PROTEIN: 7.1 GM/DL (ref 6.4–8.2)
TRICHOMONAS: ABNORMAL /HPF
TSH HIGH SENSITIVITY: 0.54 UIU/ML (ref 0.27–4.2)
UROBILINOGEN, URINE: 0.2 MG/DL (ref 0.2–1)
WBC # BLD: 8.9 K/CU MM (ref 4–10.5)
WBC UA: <1 /HPF (ref 0–5)

## 2022-10-20 PROCEDURE — 71045 X-RAY EXAM CHEST 1 VIEW: CPT

## 2022-10-20 PROCEDURE — 85025 COMPLETE CBC W/AUTO DIFF WBC: CPT

## 2022-10-20 PROCEDURE — 99285 EMERGENCY DEPT VISIT HI MDM: CPT

## 2022-10-20 PROCEDURE — 93005 ELECTROCARDIOGRAM TRACING: CPT | Performed by: PHYSICIAN ASSISTANT

## 2022-10-20 PROCEDURE — 80053 COMPREHEN METABOLIC PANEL: CPT

## 2022-10-20 PROCEDURE — 6360000004 HC RX CONTRAST MEDICATION: Performed by: PHYSICIAN ASSISTANT

## 2022-10-20 PROCEDURE — 81001 URINALYSIS AUTO W/SCOPE: CPT

## 2022-10-20 PROCEDURE — 74177 CT ABD & PELVIS W/CONTRAST: CPT

## 2022-10-20 PROCEDURE — 6370000000 HC RX 637 (ALT 250 FOR IP): Performed by: PHYSICIAN ASSISTANT

## 2022-10-20 PROCEDURE — 71275 CT ANGIOGRAPHY CHEST: CPT

## 2022-10-20 PROCEDURE — 84443 ASSAY THYROID STIM HORMONE: CPT

## 2022-10-20 PROCEDURE — 81025 URINE PREGNANCY TEST: CPT

## 2022-10-20 RX ORDER — ONDANSETRON 4 MG/1
4 TABLET, ORALLY DISINTEGRATING ORAL ONCE
Status: COMPLETED | OUTPATIENT
Start: 2022-10-20 | End: 2022-10-20

## 2022-10-20 RX ADMIN — ONDANSETRON 4 MG: 4 TABLET, ORALLY DISINTEGRATING ORAL at 20:32

## 2022-10-20 RX ADMIN — IOPAMIDOL 90 ML: 755 INJECTION, SOLUTION INTRAVENOUS at 22:45

## 2022-10-20 ASSESSMENT — PAIN - FUNCTIONAL ASSESSMENT: PAIN_FUNCTIONAL_ASSESSMENT: NONE - DENIES PAIN

## 2022-10-20 NOTE — ED PROVIDER NOTES
[address] ENCOUNTER        Pt Name: Mahesh Tarango  MRN: [id number]  Julianagfnorma 1990  Date of evaluation: 10/20/2022  Provider: Tobin Kussmaul, PA-C  PCP: BURTON Worley CNP  Note Started: 7:47 PM EDT      BERNARDA. I have evaluated this patient. My supervising physician was available for consultation. Triage CHIEF COMPLAINT       Chief Complaint   Patient presents with    Back Pain    Emesis    Palpitations    Dizziness         HISTORY OF PRESENT ILLNESS   (Location/Symptom, Timing/Onset, Context/Setting, Quality, Duration, Modifying Factors, Severity)  Note limiting factors. Chief Complaint: palpitations    Mahesh Tarango is a 34 y.o. female who presents With episode of palpitations. She also describes some mild shortness of breath, and pain over her right flank to right lower quadrant she mentions that after starting a new medication. She mentions recent visits to the emergency department and her primary care provider for back pain had been given some medications for back pain to be it caused when strained while having bm and cleaning bathroom. she states today she had not not felt right all day since taking medrol  and tramadol (jittery, nausea). She mentions approximately 45 minutes after taking first dose of medrol (initially prescribed Sunday) took tramdol; Followed up at PCP , given tizanadine and tramdol; she mentions she initially held off on the steroid prescribed to her in the ED because had previous adverse effect to it when she was given it to her for a URI. She does describe some use of stool softeners to help with a bowel movement.   She mentions she started to feel better around 4 PM today but it then 530 she started having heart racing and some mild shortness of breath    She does mention a history of pulmonary embolism, previously on anticoagulation, I seen heme-onc here, but had discontinued it after treatment duration. She denies URI symptoms, fever, sick contacts, chest pain, hemoptysis, urinary symptoms, dyspareunia, vaginal discharge      Nursing Notes were all reviewed and agreed with or any disagreements were addressed in the HPI. REVIEW OF SYSTEMS    (2-9 systems for level 4, 10 or more for level 5)     Review of Systems   Constitutional:  Negative for chills and fever. HENT:  Negative for congestion and rhinorrhea. Eyes:  Negative for pain and visual disturbance. Respiratory:  Positive for shortness of breath. Negative for cough and wheezing. Cardiovascular:  Positive for palpitations. Negative for chest pain and leg swelling. Gastrointestinal:  Positive for abdominal pain and nausea. Negative for diarrhea and vomiting. Genitourinary:  Negative for dysuria and hematuria. Musculoskeletal:  Negative for back pain and myalgias. Skin:  Negative for rash and wound. Neurological:  Negative for dizziness and light-headedness. PAST MEDICAL HISTORY     Past Medical History:   Diagnosis Date    Chest pain     GERD (gastroesophageal reflux disease)     H/O 24 hour EKG monitoring 11/3/09    48 hr holter monitor. Significant for sinus tachycardia episode the fastest of which is at 171 beats per min lasting for 3 min and 17 sec. H/O cardiovascular stress test 1/8/2015    treadmill    H/O echocardiogram 9/7/2011    EF>55%. Mild concentric left ventricular hypertrophy. Mild MR & TR. Mild mitral annular calcification. Heart abnormalities     Valve leaking see's Dr Taryn Brizuela    History of cardiac monitoring 11/26/14    Event Monitor: Sinus tachy-theresa episodes but not clinically significant. History of cardiac monitoring 12/01/2017    30 day event monitoring     History of echocardiogram 12/19/2017    Normal left ventricle structure and function. Ejection fraction is visually estimated at >60%. No significant valvular disease noted.  No evidence of pericardial effusion. Essentially normal echo. History of EKG 9/6/2011    Sinus arrhythmia. Tightward axis. Diffuse nonspecific T wave abnormalities. History of exercise stress test 12/19/2017    treadmill    Palpitations     Palpitations     Postpartum depression     Sinus arrhythmia     Thyroid disease     Underactive thyroid       SURGICAL HISTORY     Past Surgical History:   Procedure Laterality Date    FRACTURE SURGERY  1992    Left elbow/metal plate in elbow    LAPAROSCOPY      lap for cyst on ovary and checked for endometreosis    Zac Coates    left    TONSILLECTOMY         CURRENTMEDICATIONS       Discharge Medication List as of 10/20/2022 11:50 PM        CONTINUE these medications which have NOT CHANGED    Details   omeprazole (PRILOSEC) 20 MG delayed release capsule Take 1 capsule by mouth twice daily, Disp-60 capsule, R-0Normal      levothyroxine (SYNTHROID) 125 MCG tablet Take 1 tablet by mouth once daily, Disp-30 tablet, R-0Normal      tiZANidine (ZANAFLEX) 2 MG tablet Take 1 tablet by mouth 3 times daily as needed (back pain), Disp-30 tablet, R-0Normal      traMADol (ULTRAM) 50 MG tablet Take 1 tablet by mouth every 8 hours as needed for Pain for up to 5 days. Intended supply: 5 days.  Take lowest dose possible to manage pain, Disp-15 tablet, R-0Normal      lidocaine (LIDODERM) 5 % Place 1 patch onto the skin daily May substitute for lidocaine 4% patch., Disp-10 patch, R-0Normal      Acetaminophen (TYLENOL PO) as neededHistorical Med      docusate sodium (LOVE STOOL SOFTENER) 100 MG capsule Take 1 capsule by mouth 2 times daily, Disp-30 capsule, R-3Please consider 90 day supplies to promote better adherenceNormal             ALLERGIES     Amoxicillin, Pcn [penicillins], Sulfa antibiotics, and Benadryl [diphenhydramine hcl]    FAMILYHISTORY       Family History   Problem Relation Age of Onset    Depression Mother     High Cholesterol Mother     Asthma Sister     Depression Sister     High Blood Pressure Sister     Learning Disabilities Sister     Kidney Disease Maternal Aunt     Diabetes Maternal Grandmother     Early Death Maternal Grandmother     Hearing Loss Maternal Grandmother     Heart Disease Maternal Grandmother     High Blood Pressure Maternal Grandmother     Cancer Maternal Grandmother     High Cholesterol Maternal Grandmother     Hypertension Maternal Grandmother     Migraines Maternal Grandmother     Miscarriages / Stillbirths Paternal Grandmother     Thyroid Disease Father     Migraines Sister     Learning Disabilities Sister     Cancer Maternal Grandfather         SOCIAL HISTORY       Social History     Socioeconomic History    Marital status:      Spouse name: None    Number of children: None    Years of education: None    Highest education level: None   Tobacco Use    Smoking status: Never    Smokeless tobacco: Never    Tobacco comments:     reviewed 1/22/15   Vaping Use    Vaping Use: Never used   Substance and Sexual Activity    Alcohol use: No    Drug use: No    Sexual activity: Yes     Partners: Male     Comment: single     Social Determinants of Health     Financial Resource Strain: Low Risk     Difficulty of Paying Living Expenses: Not hard at all   Food Insecurity: No Food Insecurity    Worried About [employer] in the Last Year: Never true    [facility] [place] in the Last Year: Never true       SCREENINGS    Damari Coma Scale  Eye Opening: Spontaneous  Best Verbal Response: Oriented  Best Motor Response: Obeys commands  Damari Coma Scale Score: 15        PHYSICAL EXAM    (up to 7 for level 4, 8 or more for level 5)     ED Triage Vitals [10/20/22 1933]   BP Temp Temp src Heart Rate Resp SpO2 Height Weight   (!) 163/92 97.8 °F (36.6 °C) -- (!) 106 18 100 % 5' 2\" (1.575 m) 228 lb (103.4 kg)       Physical Exam  Vitals and nursing note reviewed. Constitutional:       Appearance: Normal appearance. She is well-developed. She is not ill-appearing or diaphoretic.    HENT: Head: Normocephalic and atraumatic. Eyes:      General: No scleral icterus. Right eye: No discharge. Left eye: No discharge. Cardiovascular:      Rate and Rhythm: Normal rate and regular rhythm. Heart sounds: Normal heart sounds. No murmur heard. No friction rub. No gallop. Pulmonary:      Effort: Pulmonary effort is normal. No respiratory distress. Breath sounds: Normal breath sounds. No stridor. No wheezing or rales. Chest:      Chest wall: No tenderness. Abdominal:      General: Bowel sounds are normal. There is no distension. Palpations: Abdomen is soft. There is no mass. Tenderness: There is no abdominal tenderness. There is no guarding or rebound. Musculoskeletal:         General: No tenderness. Normal range of motion. Cervical back: Normal range of motion and neck supple. Skin:     General: Skin is warm and dry. Coloration: Skin is not pale. Neurological:      Mental Status: She is alert and oriented to person, place, and time. Coordination: Coordination normal.   Psychiatric:         Mood and Affect: Mood normal.         Behavior: Behavior normal.       EMERGENCY DEPARTMENT COURSE:             Pt is a 34 y.o. female who presents with above HPI. She is declining anything for analgesics but we will give her Zofran. No acute findings on EKG. We will plan for lab work observation consideration for PE study given history of PE, mild tachycardia    Patient was given thefollowing medications:  Medications   ondansetron (ZOFRAN-ODT) disintegrating tablet 4 mg (4 mg Oral Given 10/20/22 2032)   iopamidol (ISOVUE-370) 76 % injection 90 mL (90 mLs IntraVENous Given 10/20/22 2245)           DIFFERENTIAL DIAGNOSIS/MDM:       Work-up does appear to be reassuring with normal troponin chest x-ray, TSH.  CTA chest no evidence of PE. No acute findings on CT abdomen pelvis. Repeat examination symptoms improved. Vital stable. Heart rate improved. Discussed work-up. Recommendation for outpatient follow-up. Potential adverse side effects of medications. Did discuss potential for muscle skeletal pain as a source of her flank pain with recommendations for continued outpatient management return precautions specifically for any return of any pelvic, abdominal pain, fever, difficulty breathing. Patient verbalized understanding scribbled this plan. Is this patient to be included in the SEP-1 Core Measure due to severe sepsis or septic shock? No   Exclusion criteria - the patient is NOT to be included for SEP-1 Core Measure due to:  2+ SIRS criteria are not met    Vitals:    Vitals:    10/20/22 2238 10/20/22 2332 10/20/22 2351 10/20/22 2357   BP: (!) 144/98 (!) 128/59  125/62   Pulse: 97 62 73 71   Resp: 18 24 12 14   Temp: 98 °F (36.7 °C) 98.1 °F (36.7 °C)  98 °F (36.7 °C)   TempSrc: Oral Oral  Oral   SpO2: 96% 100% 100%    Weight:       Height:           CONSULTS:   None     CRITICAL CARE TIME   N/A      DIAGNOSTIC RESULTS   LABS:    Labs Reviewed   CBC WITH AUTO DIFFERENTIAL - Abnormal; Notable for the following components:       Result Value    RBC 4.14 (*)     Segs Relative 83.3 (*)     Lymphocytes % 10.5 (*)     Monocytes % 5.9 (*)     All other components within normal limits   COMPREHENSIVE METABOLIC PANEL - Abnormal; Notable for the following components:    Sodium 134 (*)     Glucose 110 (*)     AST 13 (*)     All other components within normal limits   URINALYSIS WITH REFLEX TO CULTURE - Abnormal; Notable for the following components:    Clarity, UA SLIGHTLY CLOUDY (*)     All other components within normal limits   TSH   PREGNANCY, URINE   URINE MICROSCOPIC WITH REFLEX TO CULTURE       When ordered, only abnormal lab results are displayed. All other labs were within normal range or not returned as of this dictation. EKG:  When ordered, EKG's are interpreted by the Emergency Department Physician in the absence of a cardiologist.  Please see their note for interpretation of EKG. RADIOLOGY:   Non-plain film images such as CT, Ultrasound and MRI are read by the radiologist. Plain radiographic images are visualized andpreliminarily interpreted by the  ED Provider with the below findings:      Interpretation perthe Radiologist below, if available at the time of this note:    CT ABDOMEN PELVIS W IV CONTRAST Additional Contrast? None   Final Result   Negative for appendicitis. No focal abnormality. CTA PULMONARY W CONTRAST   Final Result   Study is motion affected. Height no central pulmonary emboli. XR CHEST PORTABLE   Final Result      Lungs are clear           No results found. PROCEDURES   Unless otherwise noted below, none     Procedures      FINAL IMPRESSION      1.  Right lumbar pain          DISPOSITION/PLAN   DISPOSITION Decision To Discharge 10/20/2022 11:45:29 PM      PATIENT REFERREDTO:  Chuy Beard, APRN - CNP  [address]  [phone]          DISCHARGE MEDICATIONS:  Discharge Medication List as of 10/20/2022 11:50 PM          DISCONTINUED MEDICATIONS:  Discharge Medication List as of 10/20/2022 11:50 PM                 (Please note that portions ofthis note were completed with a voice recognition program.  Efforts were made to edit the dictations but occasionally words are mis-transcribed.)    Angel Sims PA-C (electronically signed)             Angel Sims PA-C  10/21/22 1714       Angel Sims PA-C  10/26/22 2100

## 2022-10-21 ASSESSMENT — ENCOUNTER SYMPTOMS
SHORTNESS OF BREATH: 1
VOMITING: 0
BACK PAIN: 0
WHEEZING: 0
COUGH: 0
DIARRHEA: 0
EYE PAIN: 0
ABDOMINAL PAIN: 1
NAUSEA: 1
RHINORRHEA: 0

## 2022-10-21 NOTE — DISCHARGE INSTRUCTIONS
Follow up with your PCP to discuss your symptoms and your visit to the ED and to scheduled an appointment for re-evaluation. Continue back exercises, consider over the counter tylenol or ibuprofen. Return to ED with any difficulty breathing, fever, uncontrollable nausea and vomiting, chest pain, weakness, worsening symptoms or any new concerns.

## 2022-10-21 NOTE — ED PROVIDER NOTES
ED attending EKG interpretation (I otherwise did not participate in the care of this patient)    EKG Interpretation  Interpreted by me  Compared to 3/27/2022  Rhythm: normal sinus   Rate: normal 93  Axis: normal  Ectopy: none  Conduction: normal  ST Segments: no acute change  T Waves: no acute change  Clinical Impression: normal sinus rhythm, no acute changr     Zane North MD  10/20/22 8106

## 2022-10-21 NOTE — ED NOTES
Patient returned from [employer] scan     Maira Jefferson RN  10/20/22 3119

## 2022-10-24 ENCOUNTER — HOSPITAL ENCOUNTER (OUTPATIENT)
Dept: SLEEP CENTER | Age: 32
Discharge: HOME | End: 2022-10-24

## 2022-10-24 DIAGNOSIS — M54.50 ACUTE MIDLINE LOW BACK PAIN WITHOUT SCIATICA: Primary | ICD-10-CM

## 2022-10-24 DIAGNOSIS — G47.33 OSA (OBSTRUCTIVE SLEEP APNEA): ICD-10-CM

## 2022-10-24 LAB
EKG ATRIAL RATE: 93 BPM
EKG DIAGNOSIS: NORMAL
EKG P AXIS: 39 DEGREES
EKG P-R INTERVAL: 160 MS
EKG Q-T INTERVAL: 350 MS
EKG QRS DURATION: 86 MS
EKG QTC CALCULATION (BAZETT): 435 MS
EKG R AXIS: -2 DEGREES
EKG T AXIS: 8 DEGREES
EKG VENTRICULAR RATE: 93 BPM

## 2022-10-24 PROCEDURE — 95806 SLEEP STUDY UNATT&RESP EFFT: CPT | Performed by: INTERNAL MEDICINE

## 2022-10-24 PROCEDURE — G0398 HOME SLEEP TEST/TYPE 2 PORTA: HCPCS

## 2022-10-24 PROCEDURE — 93010 ELECTROCARDIOGRAM REPORT: CPT | Performed by: INTERNAL MEDICINE

## 2022-10-24 NOTE — PROGRESS NOTES
Gertrudis Walker ANH Clos  1990  arrived at [address] on 10/24/2022 for set up and instruction of home sleep study with the [facility] unit.  she was instructed on proper set-up and operation of HST unit. Written instructions with set up diagram given for reference and reinforcement of verbal instruction and demonstration. she was able to return demonstration appropriately. No home environment, vision, dexterity, comprehension concerns with this patient based on completed forms and patient interactions. Patient will return unit after 2 nights as instructed.     Electronically signed by Freddie Tejeda on 10/24/2022 at 9:44 AM

## 2022-10-25 ENCOUNTER — HOSPITAL ENCOUNTER (OUTPATIENT)
Age: 32
Discharge: HOME | End: 2022-10-25

## 2022-10-25 ENCOUNTER — HOSPITAL ENCOUNTER (OUTPATIENT)
Dept: GENERAL RADIOLOGY | Age: 32
Discharge: HOME | End: 2022-10-25

## 2022-10-25 DIAGNOSIS — M54.50 ACUTE MIDLINE LOW BACK PAIN WITHOUT SCIATICA: ICD-10-CM

## 2022-10-25 PROCEDURE — 72110 X-RAY EXAM L-2 SPINE 4/>VWS: CPT

## 2022-11-03 ENCOUNTER — HOSPITAL ENCOUNTER (OUTPATIENT)
Dept: SLEEP CENTER | Age: 32
Discharge: HOME | End: 2022-11-03

## 2022-11-03 DIAGNOSIS — G47.33 OSA (OBSTRUCTIVE SLEEP APNEA): ICD-10-CM

## 2022-11-03 DIAGNOSIS — E66.01 MORBID OBESITY WITH BMI OF 40.0-44.9, ADULT (HCC): ICD-10-CM

## 2022-11-03 DIAGNOSIS — G47.10 HYPERSOMNIA: ICD-10-CM

## 2022-11-03 PROCEDURE — 9990000010 HC NO CHARGE VISIT

## 2022-11-03 PROCEDURE — 99214 OFFICE O/P EST MOD 30 MIN: CPT | Performed by: INTERNAL MEDICINE

## 2022-11-03 ASSESSMENT — ENCOUNTER SYMPTOMS
COUGH: 0
EYE ITCHING: 0
BACK PAIN: 0
EYE DISCHARGE: 0
SHORTNESS OF BREATH: 0
ABDOMINAL DISTENTION: 0
ABDOMINAL PAIN: 0

## 2022-11-03 NOTE — PROGRESS NOTES
Nicko Hayward  1990  Referring Provider: BURTON Lopez CNP    Subjective:     Chief Complaint   Patient presents with    2 Week Follow-Up    Sleep Apnea       HPI  Jennifer Israel is a 34 y.o. female has come back as a follow up. She had a HST done on 10/24/22 which showed that she has no DEISY with an AHI 3 and her sleep efficiency is 87.3%. She has no loss of weight. She is less sleepy during the day time. Current Outpatient Medications   Medication Sig Dispense Refill    omeprazole (PRILOSEC) 20 MG delayed release capsule Take 1 capsule by mouth twice daily 60 capsule 0    levothyroxine (SYNTHROID) 125 MCG tablet Take 1 tablet by mouth once daily 30 tablet 0    Acetaminophen (TYLENOL PO) as needed      docusate sodium (LOVE STOOL SOFTENER) 100 MG capsule Take 1 capsule by mouth 2 times daily 30 capsule 3    lidocaine (LIDODERM) 5 % Place 1 patch onto the skin daily May substitute for lidocaine 4% patch. (Patient not taking: Reported on 11/3/2022) 10 patch 0     No current facility-administered medications for this encounter. Allergies   Allergen Reactions    Amoxicillin Hives    Pcn [Penicillins] Hives    Sulfa Antibiotics Other (See Comments)     Face turns red, itches and burns     Benadryl [Diphenhydramine Hcl] Nausea And Vomiting    Prednisone Palpitations       Past Medical History:   Diagnosis Date    Chest pain     GERD (gastroesophageal reflux disease)     H/O 24 hour EKG monitoring 11/3/09    48 hr holter monitor. Significant for sinus tachycardia episode the fastest of which is at 171 beats per min lasting for 3 min and 17 sec. H/O cardiovascular stress test 1/8/2015    treadmill    H/O echocardiogram 9/7/2011    EF>55%. Mild concentric left ventricular hypertrophy. Mild MR & TR. Mild mitral annular calcification.      Heart abnormalities     Valve leaking see's Dr Polly Martinez    History of cardiac monitoring 11/26/14    Event Monitor: Sinus tachy-theresa episodes but not clinically significant. History of cardiac monitoring 12/01/2017    30 day event monitoring     History of echocardiogram 12/19/2017    Normal left ventricle structure and function. Ejection fraction is visually estimated at >60%. No significant valvular disease noted. No evidence of pericardial effusion. Essentially normal echo. History of EKG 9/6/2011    Sinus arrhythmia. Tightward axis. Diffuse nonspecific T wave abnormalities. History of exercise stress test 12/19/2017    treadmill    Palpitations     Palpitations     Postpartum depression     Sinus arrhythmia     Thyroid disease     Underactive thyroid       Past Surgical History:   Procedure Laterality Date    FRACTURE SURGERY  1992    Left elbow/metal plate in elbow    LAPAROSCOPY      lap for cyst on ovary and checked for endometreosis    Zac Coates    left    TONSILLECTOMY         Social History     Socioeconomic History    Marital status:    Tobacco Use    Smoking status: Never    Smokeless tobacco: Never    Tobacco comments:     reviewed 1/22/15   Vaping Use    Vaping Use: Never used   Substance and Sexual Activity    Alcohol use: No    Drug use: No    Sexual activity: Yes     Partners: Male     Comment: single     Social Determinants of Health     Financial Resource Strain: Low Risk     Difficulty of Paying Living Expenses: Not hard at all   Food Insecurity: No Food Insecurity    Worried About Running Out of Food in the Last Year: Never true    Ran Out of Food in the Last Year: Never true       Review of Systems   Constitutional:  Negative for fatigue. HENT:  Negative for congestion and postnasal drip. Eyes:  Negative for discharge and itching. Respiratory:  Negative for cough and shortness of breath. Cardiovascular:  Negative for chest pain and leg swelling. Gastrointestinal:  Negative for abdominal distention and abdominal pain. Endocrine: Negative for cold intolerance and heat intolerance.    Genitourinary:  Negative for enuresis and frequency. Musculoskeletal:  Negative for arthralgias and back pain. Allergic/Immunologic: Negative for environmental allergies and food allergies. Neurological:  Negative for light-headedness and headaches. Hematological:  Negative for adenopathy. Psychiatric/Behavioral:  Negative for agitation and behavioral problems. Objective: There were no vitals taken for this visit. There is no height or weight on file to calculate BMI. Sleep Medicine 10/6/2022   Sitting and reading 1   Watching TV 3   Sitting, inactive in a public place (e.g. a theatre or a meeting) 0   As a passenger in a car for an hour without a break 0   Lying down to rest in the afternoon when circumstances permit 3   Sitting and talking to someone 0   Sitting quietly after a lunch without alcohol 1   In a car, while stopped for a few minutes in traffic 0   [place] Sleepiness Score 8   Neck circumference (Inches) 14.25     Mallampati 3    Physical Exam  Vitals reviewed. Constitutional:       Appearance: Normal appearance. HENT:      Head: Normocephalic and atraumatic. Nose: Nose normal.      Mouth/Throat:      Mouth: Mucous membranes are moist.   Eyes:      Extraocular Movements: Extraocular movements intact. Pupils: Pupils are equal, round, and reactive to light. Cardiovascular:      Rate and Rhythm: Normal rate and regular rhythm. Pulses: Normal pulses. Heart sounds: Normal heart sounds. Pulmonary:      Effort: Pulmonary effort is normal.      Breath sounds: Normal breath sounds. Abdominal:      General: Abdomen is flat. Palpations: Abdomen is soft. Musculoskeletal:         General: Normal range of motion. Cervical back: Normal range of motion and neck supple. Skin:     General: Skin is warm and dry. Neurological:      General: No focal deficit present. Mental Status: She is alert and oriented to person, place, and time.    Psychiatric:         Mood and Affect: Mood normal.         Behavior: Behavior normal.       Radiology: None    Assessment and Plan     Problem List          Respiratory    DEISY (obstructive sleep apnea)      She has no DEISY  She has less EDS            Other    Hypersomnia      She has no DEISY  She has less EDS            Morbid obesity with BMI of 40.0-44.9, adult ([address]      Advised to loose weight with diet and exercise                   Follow-Up:    No follow-ups on file.      Progress notes sent to the referring Provider    Estefania Torres MD MD  11/3/2022  11:47 AM

## 2022-11-20 DIAGNOSIS — E07.9 THYROID DISEASE: ICD-10-CM

## 2022-11-21 RX ORDER — LEVOTHYROXINE SODIUM 0.12 MG/1
TABLET ORAL
Qty: 30 TABLET | Refills: 0 | Status: SHIPPED | OUTPATIENT
Start: 2022-11-21

## 2022-12-22 DIAGNOSIS — E07.9 THYROID DISEASE: ICD-10-CM

## 2022-12-22 RX ORDER — LEVOTHYROXINE SODIUM 0.12 MG/1
TABLET ORAL
Qty: 30 TABLET | Refills: 2 | Status: SHIPPED | OUTPATIENT
Start: 2022-12-22

## 2022-12-27 ENCOUNTER — TELEMEDICINE (OUTPATIENT)
Dept: INTERNAL MEDICINE CLINIC | Age: 32
End: 2022-12-27

## 2022-12-27 DIAGNOSIS — J01.00 ACUTE NON-RECURRENT MAXILLARY SINUSITIS: Primary | ICD-10-CM

## 2022-12-27 PROCEDURE — 99213 OFFICE O/P EST LOW 20 MIN: CPT | Performed by: NURSE PRACTITIONER

## 2022-12-27 RX ORDER — DOXYCYCLINE HYCLATE 100 MG
100 TABLET ORAL 2 TIMES DAILY
Qty: 14 TABLET | Refills: 0 | Status: SHIPPED | OUTPATIENT
Start: 2022-12-27 | End: 2023-01-03

## 2022-12-27 ASSESSMENT — ENCOUNTER SYMPTOMS
NAUSEA: 0
COLOR CHANGE: 0
VOMITING: 0
COUGH: 1
APNEA: 0
SHORTNESS OF BREATH: 0
DIARRHEA: 0
RHINORRHEA: 1
SINUS PAIN: 1
ABDOMINAL PAIN: 0
CHEST TIGHTNESS: 0
SINUS PRESSURE: 1

## 2022-12-27 NOTE — PROGRESS NOTES
2022    TELEHEALTH EVALUATION -- Audio/Visual (During -84 public health emergency)    HPI:    Sanam Covarrubias (:  1990) has requested an audio/video evaluation for the following concern(s):    Amanda Waller presents via VV this afternoon for c/o worsening left sided otalgia, sinus pain/pressure, nasal congestion, PND and mild pharyngitis over the last 7-10 .  was positive recently for COVID but she did take a home COVID test which was negative. Has been taking Tylenol cold and flu, as well as Mucinex with no improvement. Review of Systems   Constitutional:  Negative for activity change, appetite change, fatigue and fever. HENT:  Positive for congestion, postnasal drip, rhinorrhea, sinus pressure and sinus pain. Negative for nosebleeds. Respiratory:  Positive for cough. Negative for apnea, chest tightness and shortness of breath. Cardiovascular:  Negative for chest pain and palpitations. Gastrointestinal:  Negative for abdominal pain, diarrhea, nausea and vomiting. Genitourinary:  Negative for difficulty urinating, flank pain and hematuria. Musculoskeletal:  Negative for arthralgias, joint swelling and myalgias. Skin:  Negative for color change and rash. Neurological:  Negative for dizziness, light-headedness and headaches. Psychiatric/Behavioral: Negative. Negative for behavioral problems. Prior to Visit Medications    Medication Sig Taking? Authorizing Provider   doxycycline hyclate (VIBRA-TABS) 100 MG tablet Take 1 tablet by mouth 2 times daily for 7 days Yes BURTON Sandoval CNP   levothyroxine (SYNTHROID) 125 MCG tablet Take 1 tablet by mouth once daily Yes BURTON Sandoval CNP   omeprazole (PRILOSEC) 20 MG delayed release capsule Take 1 capsule by mouth twice daily Yes BURTON Sandoval CNP   lidocaine (LIDODERM) 5 % Place 1 patch onto the skin daily May substitute for lidocaine 4% patch.  Yes Korinne Lusterio, PA-C   Acetaminophen (TYLENOL PO) as needed Yes Historical Provider, MD   docusate sodium (LOVE STOOL SOFTENER) 100 MG capsule Take 1 capsule by mouth 2 times daily Yes BURTON Worley CNP   omeprazole (PRILOSEC) 20 MG delayed release capsule Take 1 capsule by mouth 2 times daily  BURTON Worley CNP       Social History     Tobacco Use    Smoking status: Never    Smokeless tobacco: Never    Tobacco comments:     reviewed 1/22/15   Vaping Use    Vaping Use: Never used   Substance Use Topics    Alcohol use: No    Drug use: No        Allergies   Allergen Reactions    Amoxicillin Hives    Pcn [Penicillins] Hives    Sulfa Antibiotics Other (See Comments)     Face turns red, itches and burns     Benadryl [Diphenhydramine Hcl] Nausea And Vomiting    Prednisone Palpitations   ,   Past Medical History:   Diagnosis Date    Chest pain     GERD (gastroesophageal reflux disease)     H/O 24 hour EKG monitoring 11/3/09    48 hr holter monitor. Significant for sinus tachycardia episode the fastest of which is at 171 beats per min lasting for 3 min and 17 sec. H/O cardiovascular stress test 1/8/2015    treadmill    H/O echocardiogram 9/7/2011    EF>55%. Mild concentric left ventricular hypertrophy. Mild MR & TR. Mild mitral annular calcification. Heart abnormalities     Valve leaking see's Dr Erika Pimentel    History of cardiac monitoring 11/26/14    Event Monitor: Sinus tachy-theresa episodes but not clinically significant. History of cardiac monitoring 12/01/2017    30 day event monitoring     History of echocardiogram 12/19/2017    Normal left ventricle structure and function. Ejection fraction is visually estimated at >60%. No significant valvular disease noted. No evidence of pericardial effusion. Essentially normal echo. History of EKG 9/6/2011    Sinus arrhythmia. Tightward axis. Diffuse nonspecific T wave abnormalities.     History of exercise stress test 12/19/2017    treadmill    Palpitations     Palpitations     Postpartum depression Sinus arrhythmia     Thyroid disease     Underactive thyroid   ,   Past Surgical History:   Procedure Laterality Date    FRACTURE SURGERY  1992    Left elbow/metal plate in elbow    LAPAROSCOPY      lap for cyst on ovary and checked for endometreosis    Zac Coates    left    TONSILLECTOMY     ,   Social History     Tobacco Use    Smoking status: Never    Smokeless tobacco: Never    Tobacco comments:     reviewed 1/22/15   Vaping Use    Vaping Use: Never used   Substance Use Topics    Alcohol use: No    Drug use: No       PHYSICAL EXAMINATION:  [ INSTRUCTIONS:  \"[x]\" Indicates a positive item  \"[]\" Indicates a negative item  -- DELETE ALL ITEMS NOT EXAMINED]  Vital Signs: (As obtained by patient/caregiver or practitioner observation)    Blood pressure-  Heart rate-    Respiratory rate-    Temperature-  Pulse oximetry-     Constitutional: [x] Appears well-developed and well-nourished [] No apparent distress      [] Abnormal-   Mental status  [x] Alert and awake  [x] Oriented to person/place/time []Able to follow commands      Eyes:  EOM    [x]  Normal  [] Abnormal-  Sclera  [x]  Normal  [] Abnormal -         Discharge []  None visible  [] Abnormal -    HENT:   [x] Normocephalic, atraumatic.   [] Abnormal   [] Mouth/Throat: Mucous membranes are moist.     External Ears [] Normal  [] Abnormal-     Neck: [x] No visualized mass     Pulmonary/Chest: [x] Respiratory effort normal.  [] No visualized signs of difficulty breathing or respiratory distress        [] Abnormal-      Musculoskeletal:  [x]  Normal gait with no signs of ataxia         [x] Normal range of motion of neck        [] Abnormal-       Neurological:        [x] No Facial Asymmetry (Cranial nerve 7 motor function) (limited exam to video visit)          [x] No gaze palsy        [] Abnormal-         Skin:        [x] No significant exanthematous lesions or discoloration noted on facial skin         [] Abnormal-            Psychiatric:       [x] Normal Affect [] No Hallucinations        [] Abnormal-     Other pertinent observable physical exam findings-     ASSESSMENT/PLAN:  1. Acute non-recurrent maxillary sinusitis-exam limited virtually, however presentation most consistent with diagnoses. Given extensive allergies, will opt to use doxycycline. Unfortunately, cannot add a oral steroid given her intolerance. Continue antihistamines and daily Flonase. Return office in 1 week if no improvement. - doxycycline hyclate (VIBRA-TABS) 100 MG tablet; Take 1 tablet by mouth 2 times daily for 7 days  Dispense: 14 tablet; Refill: 0    No follow-ups on file. Glen Vaz, was evaluated through a synchronous (real-time) audio-video encounter. The patient (or guardian if applicable) is aware that this is a billable service, which includes applicable co-pays. This Virtual Visit was conducted with patient's (and/or legal guardian's) consent. The visit was conducted pursuant to the emergency declaration under the [address] [place] [address] authority and the [employer] Act. Patient identification was verified, and a caregiver was present when appropriate. The patient was located at Home: [address] [place] [address]. Provider was located at [facility] (Appt Dept): [address],  [address] [place] [address]. Total time spent on this encounter:  15 minutes    --BURTON Benavides CNP on 12/27/2022 at 4:34 PM    An electronic signature was used to authenticate this note.

## 2023-01-16 RX ORDER — OMEPRAZOLE 20 MG/1
CAPSULE, DELAYED RELEASE ORAL
Qty: 60 CAPSULE | Refills: 0 | Status: SHIPPED | OUTPATIENT
Start: 2023-01-16

## 2023-01-19 ENCOUNTER — HOSPITAL ENCOUNTER (OUTPATIENT)
Dept: INFUSION THERAPY | Age: 33
Discharge: HOME | End: 2023-01-19

## 2023-01-19 DIAGNOSIS — I26.94 MULTIPLE SUBSEGMENTAL PULMONARY EMBOLI WITHOUT ACUTE COR PULMONALE (HCC): ICD-10-CM

## 2023-01-19 LAB
ALBUMIN SERPL-MCNC: 4.4 GM/DL (ref 3.4–5)
ALP BLD-CCNC: 70 IU/L (ref 40–128)
ALT SERPL-CCNC: 18 U/L (ref 10–40)
ANION GAP SERPL CALCULATED.3IONS-SCNC: 10 MMOL/L (ref 4–16)
AST SERPL-CCNC: 14 IU/L (ref 15–37)
BASOPHILS ABSOLUTE: 0 K/CU MM
BASOPHILS RELATIVE PERCENT: 0.3 % (ref 0–1)
BILIRUB SERPL-MCNC: 0.4 MG/DL (ref 0–1)
BUN BLDV-MCNC: 12 MG/DL (ref 6–23)
CALCIUM SERPL-MCNC: 9.2 MG/DL (ref 8.3–10.6)
CHLORIDE BLD-SCNC: 103 MMOL/L (ref 99–110)
CO2: 26 MMOL/L (ref 21–32)
CREAT SERPL-MCNC: 0.7 MG/DL (ref 0.6–1.1)
D DIMER: <200 NG/ML(DDU)
DIFFERENTIAL TYPE: ABNORMAL
EOSINOPHILS ABSOLUTE: 0.1 K/CU MM
EOSINOPHILS RELATIVE PERCENT: 1.4 % (ref 0–3)
GFR SERPL CREATININE-BSD FRML MDRD: >60 ML/MIN/1.73M2
GLUCOSE BLD-MCNC: 92 MG/DL (ref 70–99)
HCT VFR BLD CALC: 36.2 % (ref 37–47)
HEMOGLOBIN: 11.6 GM/DL (ref 12.5–16)
LYMPHOCYTES ABSOLUTE: 1.4 K/CU MM
LYMPHOCYTES RELATIVE PERCENT: 23.5 % (ref 24–44)
MCH RBC QN AUTO: 30 PG (ref 27–31)
MCHC RBC AUTO-ENTMCNC: 32 % (ref 32–36)
MCV RBC AUTO: 93.5 FL (ref 78–100)
MONOCYTES ABSOLUTE: 0.4 K/CU MM
MONOCYTES RELATIVE PERCENT: 7.3 % (ref 0–4)
PDW BLD-RTO: 13.3 % (ref 11.7–14.9)
PLATELET # BLD: 275 K/CU MM (ref 140–440)
PMV BLD AUTO: 9.2 FL (ref 7.5–11.1)
POTASSIUM SERPL-SCNC: 4 MMOL/L (ref 3.5–5.1)
RBC # BLD: 3.87 M/CU MM (ref 4.2–5.4)
SEGMENTED NEUTROPHILS ABSOLUTE COUNT: 4 K/CU MM
SEGMENTED NEUTROPHILS RELATIVE PERCENT: 67.5 % (ref 36–66)
SODIUM BLD-SCNC: 139 MMOL/L (ref 135–145)
TOTAL PROTEIN: 6.5 GM/DL (ref 6.4–8.2)
WBC # BLD: 5.9 K/CU MM (ref 4–10.5)

## 2023-01-19 PROCEDURE — 85379 FIBRIN DEGRADATION QUANT: CPT

## 2023-01-19 PROCEDURE — 80053 COMPREHEN METABOLIC PANEL: CPT

## 2023-01-19 PROCEDURE — 36415 COLL VENOUS BLD VENIPUNCTURE: CPT

## 2023-01-19 PROCEDURE — 85025 COMPLETE CBC W/AUTO DIFF WBC: CPT

## 2023-01-21 NOTE — PROGRESS NOTES
Patient Name:  Faby Ortiz  Patient :  1990  Patient MRN:  [id number]     Primary Oncologist: Ramana Simpson MD  Referring Provider: BURTON Torres CNP     Date of Service: 2023      Chief Complaint:    Chief Complaint   Patient presents with    Follow-up    Results         Patient Active Problem List:     Fibrocystic breast disease     Thyroid disease     Sinus arrhythmia     Vitamin D deficiency     Multiple subsegmental pulmonary emboli without acute cor pulmonale (HCC)    HPI:   Roshni MarshallSavannah Bernal is a 50-year-old very pleasant female with medical history significant for hypothyroidism, fibrocystic breast disease and vitamin D deficiency, referred to me on 2022 for evaluation of unprovoked pulmonary embolism. She initially presented to [facility] ED on 3/27/22 with chest pain and SOB. CTA pulmonary done on 3/27/2022 showed slightly suboptimal study. Findings highly suspicious for left-sided pulmonary emboli. Mild dilatation of the main pulmonary trunk, which can be seen in pulmonary arterial hypertension. She was started with eliquis since then. Her tightness of chest has improved some and she still has some chest pain. She denies sedentary life style, prolong immobilization, use of oral birth control. One of her half sister has VTE. She doesn't know much about her father side family history. All the hypercoagulable work ups done on 22 were normal.    On 2023, she presented to me for follow-up. She was initially referred to me for evaluation of unprovoked venous thromboembolism. All the hypercoagulable work-ups were within normal range. Since all the hypercoagulable work-ups were normal and she only has mild clot burden only, I recommend for total 4 months of anticoagulation therapy. Since she has been on [facility] therapy for 4 months already, we stopped eliquis on 22. She doesn't have any sign or symptom suggestive of VTE on today visit.  I asked her to continue with baby aspirin daily. Educate her about signs and symptoms of VTE and I asked her to contact us if she develops any of those signs or symptoms. Anemia - will start ferrous sulfate today. Will check her labs again in 6 months and will request iron panel at that time. She does not have any significant symptoms at today visit. Past Medical History:     Significant for  1. Hypothyroidism  2. Fibrocystic breast disease  3. Vitamin D deficiency    Past Surgery History:    Significant for  1. Tonsillectomy  2. Left elbow fracture surgery  3. Left ovarian cyst removal in April 2009  4. Bilateral salpingectomy  5. Left shoulder surgery    Social History:   She denies smoking, alcohol drinking or illicit drug abuse. Family History:    Significant for liver cancer in her maternal grandmother, lung cancer in her maternal grandfather. One of her maternal aunt has kidney cancer. She does not know much about her father's side. One of her sister (same mother/different father) has VTE. Allergies   Allergen Reactions    Amoxicillin Hives    Pcn [Penicillins] Hives    Sulfa Antibiotics Other (See Comments)     Face turns red, itches and burns     Benadryl [Diphenhydramine Hcl] Nausea And Vomiting    Prednisone Palpitations     Review of Systems: \"Per interval history; otherwise 10 point ROS is negative. \"  Her energy level is okay and her sleep is fine. She doesn't have fever, chills, night sweats, cough, shortness of breath, chest pain, hemoptysis or palpitations. Her bowels and bladder functions are normal. She denies nausea, vomiting, abdominal pain, diarrhea, constipation, dysuria, loss of appetite or weight loss. She doesn't have neuropathy and she denies bleeding issue. She denies any pain in her body. No anxiety or depression. The rest of the systems are unremarkable.        Vital Signs: /74 (Site: Right Upper Arm, Position: Sitting, Cuff Size: Large Adult)   Pulse 53   Temp 97.9 °F (36.6 °C) (Temporal)   Ht 5' 2\" (1.575 m)   Wt 234 lb 12.8 oz (106.5 kg)   SpO2 97%   BMI 42.95 kg/m²      Physical Exam:  CONSTITUTIONAL: awake, alert, cooperative, no apparent distress   EYES: pupils equal, round and reactive to light, sclera clear, normal conjunctiva  ENT: Normocephalic, without obvious abnormality, atraumatic  NECK: supple, symmetrical, no jugular venous distension, no carotid bruits   HEMATOLOGIC/LYMPHATIC: no cervical, supraclavicular or axillary lymphadenopathy   LUNGS: VBS, no wheezes, clear to auscultation, no crackles, no increased work of breathing, no rhonchi,    CARDIOVASCULAR: regular rate and rhythm, normal S1 and S2, no murmur noted  ABDOMEN: normal bowel sounds x 4, soft, non-distended, non-tender, no masses palpated, no hepatosplenomegaly   MUSCULOSKELETAL: full range of motion noted, tone is normal  NEUROLOGIC: awake, alert, oriented to name, place and time. Motor skills grossly intact. SKIN: appears intact, normal skin color, normal texture, normal turgor, no jaundice.    EXTREMITIES: no leg swelling, no LE edema, no clubbing, no cyanosis,        Labs:  Hematology:  Lab Results   Component Value Date    WBC 5.9 01/19/2023    RBC 3.87 (L) 01/19/2023    HGB 11.6 (L) 01/19/2023    HCT 36.2 (L) 01/19/2023    MCV 93.5 01/19/2023    MCH 30.0 01/19/2023    MCHC 32.0 01/19/2023    RDW 13.3 01/19/2023     01/19/2023    MPV 9.2 01/19/2023    SEGSPCT 67.5 (H) 01/19/2023    EOSRELPCT 1.4 01/19/2023    BASOPCT 0.3 01/19/2023    LYMPHOPCT 23.5 (L) 01/19/2023    MONOPCT 7.3 (H) 01/19/2023    SEGSABS 4.0 01/19/2023    EOSABS 0.1 01/19/2023    BASOSABS 0.0 01/19/2023    LYMPHSABS 1.4 01/19/2023    MONOSABS 0.4 01/19/2023    DIFFTYPE AUTOMATED DIFFERENTIAL 01/19/2023     No results found for: ESR  Chemistry:  Lab Results   Component Value Date     01/19/2023    K 4.0 01/19/2023     01/19/2023    CO2 26 01/19/2023    BUN 12 01/19/2023    CREATININE 0.7 01/19/2023 GLUCOSE 92 01/19/2023    CALCIUM 9.2 01/19/2023    PROT 6.5 01/19/2023    LABALBU 4.4 01/19/2023    BILITOT 0.4 01/19/2023    ALKPHOS 70 01/19/2023    AST 14 (L) 01/19/2023    ALT 18 01/19/2023    LABGLOM >60 01/19/2023    GFRAA >60 03/27/2022    AGRATIO 1.8 02/25/2022    GLOB 2.7 03/29/2021     Lab Results   Component Value Date    HOMOCYSTEINE 10.6 (H) 04/20/2022     No components found for: LD  Lab Results   Component Value Date    TSHHS 0.535 10/20/2022    T4FREE 1.66 11/26/2017    FT3 3.2 10/18/2010     Immunology:  Lab Results   Component Value Date    PROT 6.5 01/19/2023     No results found for: Shemar Kim, KLFLCR  No results found for: B2M  Coagulation Panel:  Lab Results   Component Value Date    PROTIME 11.3 07/10/2018    INR 0.97 07/10/2018    APTT 31.7 07/10/2018    DDIMER <200 01/19/2023     Anemia Panel:  Lab Results   Component Value Date    UOKRXROH22 253 10/18/2018    FOLATE 11.21 10/18/2018     Tumor Markers:  No results found for: , CEA, , LABCA2, PSA     Observations:  PHQ-9 Total Score: 0 (1/26/2023 10:59 AM)     Assessment   Unprovoked PE    Plan:  Manisha CHUA Denae Colindres is a 26-year-old very pleasant female who initially presented to [facility] ED on 3/27/22 with chest pain and SOB. CTA pulmonary done on 3/27/2022 showed slightly suboptimal study. Findings highly suspicious for left-sided pulmonary emboli. Mild dilatation of the main pulmonary trunk, which can be seen in pulmonary arterial hypertension. She was started with eliquis since then. She denies sedentary life style, prolong immobilization, use of oral birth control. One of her half sister has VTE. She doesn't know much about her father side family history. All the hypercoagulable work ups done on 4/20/22 were normal.    On January 26, 2023, she presented to me for follow-up. She was initially referred to me for evaluation of unprovoked venous thromboembolism.     All the hypercoagulable work-ups were within normal range.  Since all the hypercoagulable work-ups were normal and she only has mild clot burden only, I recommend for total 4 months of anticoagulation therapy. Since she has been on [facility] therapy for 4 months already, we stopped eliquis on 7/27/22. She doesn't have any sign or symptom suggestive of VTE on today visit. I asked her to continue with baby aspirin daily. Educate her about signs and symptoms of VTE and I asked her to contact us if she develops any of those signs or symptoms. Anemia - will start ferrous sulfate today. Will check her labs again in 6 months and will request iron panel at that time. I answered all her questions and concerns for today. Recent imaging and labs were reviewed and discussed with the patient.

## 2023-01-26 ENCOUNTER — OFFICE VISIT (OUTPATIENT)
Dept: ONCOLOGY | Age: 33
End: 2023-01-26

## 2023-01-26 ENCOUNTER — HOSPITAL ENCOUNTER (OUTPATIENT)
Dept: INFUSION THERAPY | Age: 33
Discharge: HOME | End: 2023-01-26

## 2023-01-26 VITALS
OXYGEN SATURATION: 97 % | WEIGHT: 234.8 LBS | BODY MASS INDEX: 43.21 KG/M2 | TEMPERATURE: 97.9 F | HEIGHT: 62 IN | SYSTOLIC BLOOD PRESSURE: 137 MMHG | HEART RATE: 53 BPM | DIASTOLIC BLOOD PRESSURE: 74 MMHG

## 2023-01-26 DIAGNOSIS — I26.94 MULTIPLE SUBSEGMENTAL PULMONARY EMBOLI WITHOUT ACUTE COR PULMONALE (HCC): Primary | ICD-10-CM

## 2023-01-26 DIAGNOSIS — D64.9 ANEMIA, UNSPECIFIED TYPE: ICD-10-CM

## 2023-01-26 PROCEDURE — 99211 OFF/OP EST MAY X REQ PHY/QHP: CPT

## 2023-01-26 PROCEDURE — 99213 OFFICE O/P EST LOW 20 MIN: CPT | Performed by: INTERNAL MEDICINE

## 2023-01-26 RX ORDER — FERROUS SULFATE 325(65) MG
325 TABLET ORAL
Qty: 30 TABLET | Refills: 5 | Status: SHIPPED | OUTPATIENT
Start: 2023-01-26

## 2023-01-26 ASSESSMENT — PATIENT HEALTH QUESTIONNAIRE - PHQ9
2. FEELING DOWN, DEPRESSED OR HOPELESS: 0
SUM OF ALL RESPONSES TO PHQ QUESTIONS 1-9: 0
1. LITTLE INTEREST OR PLEASURE IN DOING THINGS: 0
SUM OF ALL RESPONSES TO PHQ QUESTIONS 1-9: 0
SUM OF ALL RESPONSES TO PHQ9 QUESTIONS 1 & 2: 0
SUM OF ALL RESPONSES TO PHQ QUESTIONS 1-9: 0
SUM OF ALL RESPONSES TO PHQ QUESTIONS 1-9: 0

## 2023-01-26 NOTE — PROGRESS NOTES
MA Rooming Questions  Patient: Nadir Box  MRN: [id number]    Date: 1/26/2023        1. Do you have any new issues?   no         2. Do you need any refills on medications?    no    3. Have you had any imaging done since your last visit? yes - labs 1/19    4. Have you been hospitalized or seen in the emergency room since your last visit here?   yes - 10/26 10/20    5. Did the patient have a depression screening completed today?  Yes    PHQ-9 Total Score: 0 (1/26/2023 10:59 AM)       PHQ-9 Given to (if applicable):               PHQ-9 Score (if applicable):                     [] Positive     []  Negative              Does question #9 need addressed (if applicable)                     [] Yes    []  No               Ron Robert CMA

## 2023-02-02 ENCOUNTER — OFFICE VISIT (OUTPATIENT)
Dept: INTERNAL MEDICINE CLINIC | Age: 33
End: 2023-02-02

## 2023-02-02 VITALS
SYSTOLIC BLOOD PRESSURE: 116 MMHG | RESPIRATION RATE: 18 BRPM | OXYGEN SATURATION: 99 % | HEIGHT: 62 IN | BODY MASS INDEX: 43.92 KG/M2 | WEIGHT: 238.7 LBS | DIASTOLIC BLOOD PRESSURE: 74 MMHG | HEART RATE: 58 BPM

## 2023-02-02 DIAGNOSIS — J01.00 ACUTE NON-RECURRENT MAXILLARY SINUSITIS: Primary | ICD-10-CM

## 2023-02-02 PROCEDURE — 99213 OFFICE O/P EST LOW 20 MIN: CPT | Performed by: NURSE PRACTITIONER

## 2023-02-02 RX ORDER — LEVOFLOXACIN 500 MG/1
500 TABLET, FILM COATED ORAL DAILY
Qty: 10 TABLET | Refills: 0 | Status: SHIPPED | OUTPATIENT
Start: 2023-02-02 | End: 2023-02-12

## 2023-02-02 SDOH — ECONOMIC STABILITY: FOOD INSECURITY: WITHIN THE PAST 12 MONTHS, YOU WORRIED THAT YOUR FOOD WOULD RUN OUT BEFORE YOU GOT MONEY TO BUY MORE.: NEVER TRUE

## 2023-02-02 SDOH — ECONOMIC STABILITY: INCOME INSECURITY: HOW HARD IS IT FOR YOU TO PAY FOR THE VERY BASICS LIKE FOOD, HOUSING, MEDICAL CARE, AND HEATING?: NOT HARD AT ALL

## 2023-02-02 SDOH — ECONOMIC STABILITY: FOOD INSECURITY: WITHIN THE PAST 12 MONTHS, THE FOOD YOU BOUGHT JUST DIDN'T LAST AND YOU DIDN'T HAVE MONEY TO GET MORE.: NEVER TRUE

## 2023-02-02 SDOH — ECONOMIC STABILITY: HOUSING INSECURITY
IN THE LAST 12 MONTHS, WAS THERE A TIME WHEN YOU DID NOT HAVE A STEADY PLACE TO SLEEP OR SLEPT IN A SHELTER (INCLUDING NOW)?: NO

## 2023-02-02 ASSESSMENT — ENCOUNTER SYMPTOMS
APNEA: 0
COLOR CHANGE: 0
DIARRHEA: 0
ABDOMINAL PAIN: 0
VOMITING: 0
RHINORRHEA: 1
SHORTNESS OF BREATH: 0
SINUS PAIN: 1
SINUS PRESSURE: 1
CHEST TIGHTNESS: 0
NAUSEA: 0
COUGH: 0

## 2023-02-02 NOTE — PROGRESS NOTES
Darrell KAMARA Clos  1990 02/02/23    Chief Complaint   Patient presents with    Nasal Congestion     Sinus pressure, post nasal drip, left ear pain sx started December        SUBJECTIVE:      Darrell Ayala presents to the office this morning for c/o cold symptoms. States that she tested for COVID later December and had heavy cough, chest congestion, and HA which quickly subsided, however, shortly after she has had persistent nasal congestion, PND, rhinorrhea, sinus pain/pressure and mild left sided otalgia lingering over the last several weeks. Has been taking Flonase and mucinex with modest benefit. When she blows her nose in the morning she produces thick yellow/green mucus. Denies any fevers, chills. No known recent COVID exposure. Review of Systems   Constitutional:  Negative for activity change, appetite change, fatigue and fever. HENT:  Positive for congestion, ear pain, postnasal drip, rhinorrhea, sinus pressure and sinus pain. Negative for nosebleeds. Respiratory:  Negative for apnea, cough, chest tightness and shortness of breath. Cardiovascular:  Negative for chest pain and palpitations. Gastrointestinal:  Negative for abdominal pain, diarrhea, nausea and vomiting. Genitourinary:  Negative for difficulty urinating, flank pain and hematuria. Musculoskeletal:  Negative for arthralgias, joint swelling and myalgias. Skin:  Negative for color change and rash. Neurological:  Negative for dizziness, light-headedness and headaches. Psychiatric/Behavioral: Negative. Negative for behavioral problems. OBJECTIVE:    /74   Pulse 58   Resp 18   Ht 5' 2\" (1.575 m)   Wt 238 lb 11.2 oz (108.3 kg)   SpO2 99%   BMI 43.66 kg/m²     Physical Exam  Constitutional:       General: She is not in acute distress. Appearance: She is well-developed. She is not diaphoretic. HENT:      Head: Normocephalic and atraumatic.       Nose: Nose normal.      Mouth/Throat:      Pharynx: No oropharyngeal exudate. Cardiovascular:      Rate and Rhythm: Normal rate and regular rhythm. Pulmonary:      Effort: Pulmonary effort is normal. No respiratory distress. Breath sounds: Normal breath sounds. Abdominal:      General: Bowel sounds are normal.      Palpations: Abdomen is soft. Tenderness: There is no abdominal tenderness. Musculoskeletal:         General: Normal range of motion. Cervical back: Normal range of motion and neck supple. No edema or erythema. No muscular tenderness. Skin:     General: Skin is warm and dry. Capillary Refill: Capillary refill takes less than 2 seconds. Neurological:      Mental Status: She is alert and oriented to person, place, and time. Coordination: Coordination normal.   Psychiatric:         Thought Content: Thought content normal.       ASSESSMENT:    1. Acute non-recurrent maxillary sinusitis        PLAN:    Julienne Soulier was seen today for nasal congestion. Diagnoses and all orders for this visit:    Acute non-recurrent maxillary sinusitis-ENT exam unremarkable and respiratory exam also benign. Suspect ongoing/possibly unresolved lingering sinusitis. Will cover empirically with levofloxacin 500 mg daily x10 days below. Continue Flonase and Mucinex as needed. Return office in 1 week if symptoms have not resolved, sooner if any issues. -     levoFLOXacin (LEVAQUIN) 500 MG tablet; Take 1 tablet by mouth daily for 10 days      No flowsheet data found. Return if symptoms worsen or fail to improve. Ole note this reports has been produced speech recognition software and may contain errors related to that system including errors in grammar, punctuation, and spelling, as well as words and phrases that may be appropriate. If there are any questions or concerns please feel free to contact the dictating provider for clarification.

## 2023-02-13 ENCOUNTER — CLINICAL DOCUMENTATION (OUTPATIENT)
Dept: ONCOLOGY | Age: 33
End: 2023-02-13

## 2023-02-13 ENCOUNTER — HOSPITAL ENCOUNTER (EMERGENCY)
Age: 33
Discharge: HOME | End: 2023-02-13

## 2023-02-13 ENCOUNTER — APPOINTMENT (OUTPATIENT)
Dept: GENERAL RADIOLOGY | Age: 33
End: 2023-02-13

## 2023-02-13 ENCOUNTER — APPOINTMENT (OUTPATIENT)
Dept: ULTRASOUND IMAGING | Age: 33
End: 2023-02-13

## 2023-02-13 ENCOUNTER — APPOINTMENT (OUTPATIENT)
Dept: CT IMAGING | Age: 33
End: 2023-02-13

## 2023-02-13 VITALS
SYSTOLIC BLOOD PRESSURE: 114 MMHG | HEART RATE: 88 BPM | DIASTOLIC BLOOD PRESSURE: 89 MMHG | TEMPERATURE: 98.4 F | HEIGHT: 62 IN | RESPIRATION RATE: 25 BRPM | WEIGHT: 237 LBS | OXYGEN SATURATION: 98 % | BODY MASS INDEX: 43.61 KG/M2

## 2023-02-13 DIAGNOSIS — N30.00 ACUTE CYSTITIS WITHOUT HEMATURIA: Primary | ICD-10-CM

## 2023-02-13 DIAGNOSIS — R07.9 CHEST PAIN, UNSPECIFIED TYPE: ICD-10-CM

## 2023-02-13 LAB
ALBUMIN SERPL-MCNC: 4.4 GM/DL (ref 3.4–5)
ALP BLD-CCNC: 75 IU/L (ref 40–129)
ALT SERPL-CCNC: 16 U/L (ref 10–40)
ANION GAP SERPL CALCULATED.3IONS-SCNC: 9 MMOL/L (ref 4–16)
AST SERPL-CCNC: 15 IU/L (ref 15–37)
BACTERIA: ABNORMAL /HPF
BASOPHILS ABSOLUTE: 0 K/CU MM
BASOPHILS RELATIVE PERCENT: 0.5 % (ref 0–1)
BILIRUB SERPL-MCNC: 0.3 MG/DL (ref 0–1)
BILIRUBIN URINE: NEGATIVE MG/DL
BLOOD, URINE: NEGATIVE
BUN SERPL-MCNC: 10 MG/DL (ref 6–23)
CALCIUM SERPL-MCNC: 8.8 MG/DL (ref 8.3–10.6)
CHLORIDE BLD-SCNC: 98 MMOL/L (ref 99–110)
CLARITY: CLEAR
CO2: 24 MMOL/L (ref 21–32)
COLOR: YELLOW
CREAT SERPL-MCNC: 0.7 MG/DL (ref 0.6–1.1)
DIFFERENTIAL TYPE: ABNORMAL
EKG ATRIAL RATE: 97 BPM
EKG DIAGNOSIS: NORMAL
EKG P AXIS: 46 DEGREES
EKG P-R INTERVAL: 168 MS
EKG Q-T INTERVAL: 330 MS
EKG QRS DURATION: 84 MS
EKG QTC CALCULATION (BAZETT): 419 MS
EKG R AXIS: 0 DEGREES
EKG T AXIS: 12 DEGREES
EKG VENTRICULAR RATE: 97 BPM
EOSINOPHILS ABSOLUTE: 0 K/CU MM
EOSINOPHILS RELATIVE PERCENT: 0.3 % (ref 0–3)
GFR SERPL CREATININE-BSD FRML MDRD: >60 ML/MIN/1.73M2
GLUCOSE SERPL-MCNC: 96 MG/DL (ref 70–99)
GLUCOSE, URINE: NEGATIVE MG/DL
HCT VFR BLD CALC: 39.9 % (ref 37–47)
HEMOGLOBIN: 12.7 GM/DL (ref 12.5–16)
HYALINE CASTS: 0 /LPF
IMMATURE NEUTROPHIL %: 0.3 % (ref 0–0.43)
INR BLD: 0.95 INDEX
KETONES, URINE: NEGATIVE MG/DL
LEUKOCYTE ESTERASE, URINE: ABNORMAL
LIPASE: 18 IU/L (ref 13–60)
LYMPHOCYTES ABSOLUTE: 1.2 K/CU MM
LYMPHOCYTES RELATIVE PERCENT: 21 % (ref 24–44)
MAGNESIUM: 2.1 MG/DL (ref 1.8–2.4)
MCH RBC QN AUTO: 30 PG (ref 27–31)
MCHC RBC AUTO-ENTMCNC: 31.8 % (ref 32–36)
MCV RBC AUTO: 94.1 FL (ref 78–100)
MONOCYTES ABSOLUTE: 0.4 K/CU MM
MONOCYTES RELATIVE PERCENT: 6.3 % (ref 0–4)
MUCUS: ABNORMAL HPF
NITRITE URINE, QUANTITATIVE: NEGATIVE
NUCLEATED RBC %: 0 %
PDW BLD-RTO: 12.7 % (ref 11.7–14.9)
PH, URINE: 6 (ref 5–8)
PLATELET # BLD: 307 K/CU MM (ref 140–440)
PMV BLD AUTO: 9.2 FL (ref 7.5–11.1)
POTASSIUM SERPL-SCNC: 3.5 MMOL/L (ref 3.5–5.1)
PROTEIN UA: NEGATIVE MG/DL
PROTHROMBIN TIME: 12.2 SECONDS (ref 11.7–14.5)
RBC # BLD: 4.24 M/CU MM (ref 4.2–5.4)
RBC URINE: 4 /HPF (ref 0–6)
SEGMENTED NEUTROPHILS ABSOLUTE COUNT: 4.2 K/CU MM
SEGMENTED NEUTROPHILS RELATIVE PERCENT: 71.6 % (ref 36–66)
SODIUM BLD-SCNC: 131 MMOL/L (ref 135–145)
SPECIFIC GRAVITY UA: <1.005 (ref 1–1.03)
SQUAMOUS EPITHELIAL: 7 /HPF
T4 FREE SERPL-MCNC: 1.66 NG/DL (ref 0.9–1.8)
TOTAL IMMATURE NEUTOROPHIL: 0.02 K/CU MM
TOTAL NUCLEATED RBC: 0 K/CU MM
TOTAL PROTEIN: 7.6 GM/DL (ref 6.4–8.2)
TRICHOMONAS: ABNORMAL /HPF
TROPONIN T: <0.01 NG/ML
TSH SERPL DL<=0.005 MIU/L-ACNC: 3.71 UIU/ML (ref 0.27–4.2)
UROBILINOGEN, URINE: 0.2 MG/DL (ref 0.2–1)
WBC # BLD: 5.9 K/CU MM (ref 4–10.5)
WBC UA: ABNORMAL /HPF (ref 0–5)

## 2023-02-13 PROCEDURE — 85025 COMPLETE CBC W/AUTO DIFF WBC: CPT

## 2023-02-13 PROCEDURE — 84484 ASSAY OF TROPONIN QUANT: CPT

## 2023-02-13 PROCEDURE — 71275 CT ANGIOGRAPHY CHEST: CPT

## 2023-02-13 PROCEDURE — 99285 EMERGENCY DEPT VISIT HI MDM: CPT

## 2023-02-13 PROCEDURE — 81001 URINALYSIS AUTO W/SCOPE: CPT

## 2023-02-13 PROCEDURE — 83690 ASSAY OF LIPASE: CPT

## 2023-02-13 PROCEDURE — 87086 URINE CULTURE/COLONY COUNT: CPT

## 2023-02-13 PROCEDURE — 71045 X-RAY EXAM CHEST 1 VIEW: CPT

## 2023-02-13 PROCEDURE — 6360000004 HC RX CONTRAST MEDICATION: Performed by: NURSE PRACTITIONER

## 2023-02-13 PROCEDURE — 83735 ASSAY OF MAGNESIUM: CPT

## 2023-02-13 PROCEDURE — 93005 ELECTROCARDIOGRAM TRACING: CPT | Performed by: NURSE PRACTITIONER

## 2023-02-13 PROCEDURE — 85610 PROTHROMBIN TIME: CPT

## 2023-02-13 PROCEDURE — 84443 ASSAY THYROID STIM HORMONE: CPT

## 2023-02-13 PROCEDURE — 80053 COMPREHEN METABOLIC PANEL: CPT

## 2023-02-13 PROCEDURE — 84439 ASSAY OF FREE THYROXINE: CPT

## 2023-02-13 PROCEDURE — 93010 ELECTROCARDIOGRAM REPORT: CPT | Performed by: INTERNAL MEDICINE

## 2023-02-13 PROCEDURE — 93971 EXTREMITY STUDY: CPT

## 2023-02-13 RX ORDER — NITROFURANTOIN 25; 75 MG/1; MG/1
100 CAPSULE ORAL 2 TIMES DAILY
Qty: 14 CAPSULE | Refills: 0 | Status: SHIPPED | OUTPATIENT
Start: 2023-02-13 | End: 2023-02-15

## 2023-02-13 RX ORDER — SODIUM CHLORIDE 0.9 % (FLUSH) 0.9 %
5-40 SYRINGE (ML) INJECTION 2 TIMES DAILY
Status: DISCONTINUED | OUTPATIENT
Start: 2023-02-13 | End: 2023-02-13 | Stop reason: HOSPADM

## 2023-02-13 RX ADMIN — IOPAMIDOL 80 ML: 755 INJECTION, SOLUTION INTRAVENOUS at 14:13

## 2023-02-13 NOTE — ED PROVIDER NOTES
12 lead EKG per my interpretation:  Normal Sinus Rhythm at 97  Axis is   Normal  QTc is  within an acceptable range  There is no specific T wave changes appreciated. There is no specific ST wave changes appreciated. No STEMI    Prior EKG to compare with was available and normal sinus rhythm seen on prior.     MD Celia Nunes MD  02/13/23 [id number]

## 2023-02-13 NOTE — ED NOTES
RENETTA Mosher from [facility] called regarding pt. Pt was seeing Dr. Martin Apo for unprovoked PE, was on elequis but was taken off of it in July. Pt presenting with back and chest pain and had similar s/s with previous PE. Dr. Martin Apo requesting a D-Dimer.       Damion Deelon RN  02/13/23 [id number]

## 2023-02-13 NOTE — PROGRESS NOTES
This nurse received a VM from the patient expressing concerns of back pain, chest pain and dizziness. The patient states that these symptoms are consistent with her previous PE. This nurse spoke with Dr Geri Jimenez would like her to have a CTA, CBC, CMP and D-dimer ordered. This nurse called the patient back @ [phone] and spoke Angella Hickey, she is currently in the ED to be seen. This nurse explained what Dr Geir Jimenez was going to order. The patient verbalized understanding. This nurse also called the ED charge nurse and relayed the patient complaints, reason she was being followed by Dr Geri Jimenez and his request for a CT and D-dimer.

## 2023-02-13 NOTE — ED PROVIDER NOTES
[address] ENCOUNTER        Pt Name: Lamar Linda  MRN: [id number]  [place] 1990  Date of evaluation: 2/13/2023  Provider: BURTON Castro CNP  PCP: BURTON Purcell CNP     I have discussed the care of this patient with my supervising physician Dr. Mayra Rosales who is in agreement with the evaluation and plan of care. Triage CHIEF COMPLAINT       Chief Complaint   Patient presents with    Chest Pain    Fatigue         HISTORY OF PRESENT ILLNESS      Chief Complaint: chest pain, fatigue    Lamar Linda is a 28 y.o. female who presents for evaluation of chest pain that started yesterday. She feels dizzy and nauseated this morning. Recently she has been feeling fatigued as well. 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 Was on anticoagulants until July 2022 and then PCP discontinued. She has been having some left groin pain but no calf pain. Denies prior history of CAD. She does have history of sinus tachycardia in past.  She has palpitations chronically but has not noticed any worse symptoms recently. She previously saw Dr. Cathleen Vazquez at the [facility] but hasn't seen him since 2017 due to lack of insurance. She has had a recent history of anemia for which she has started taking iron. Also has history of hypothyroidism, on levothyroxine but has not had TSH checked in 6 months. She has not had any URI symptoms, F/C, N/V/D. Has constipation and occasionally has associated lower abdominal discomfort but nothing out of ordinary. Nursing Notes were all reviewed and agreed with or any disagreements were addressed in the HPI. REVIEW OF SYSTEMS     Pertinent ROS as noted in HPI. PAST MEDICAL HISTORY     Past Medical History:   Diagnosis Date    Chest pain     GERD (gastroesophageal reflux disease)     H/O 24 hour EKG monitoring 11/3/09    48 hr holter monitor.  Significant for sinus tachycardia episode the fastest of which is at 171 beats per min lasting for 3 min and 17 sec. H/O cardiovascular stress test 1/8/2015    treadmill    H/O echocardiogram 9/7/2011    EF>55%. Mild concentric left ventricular hypertrophy. Mild MR & TR. Mild mitral annular calcification. Heart abnormalities     Valve leaking see's Dr Rodolfo Nam    History of cardiac monitoring 11/26/14    Event Monitor: Sinus tachy-theresa episodes but not clinically significant. History of cardiac monitoring 12/01/2017    30 day event monitoring     History of echocardiogram 12/19/2017    Normal left ventricle structure and function. Ejection fraction is visually estimated at >60%. No significant valvular disease noted. No evidence of pericardial effusion. Essentially normal echo. History of EKG 9/6/2011    Sinus arrhythmia. Tightward axis. Diffuse nonspecific T wave abnormalities.     History of exercise stress test 12/19/2017    treadmill    Palpitations     Palpitations     Postpartum depression     Sinus arrhythmia     Thyroid disease     Underactive thyroid       SURGICAL HISTORY     Past Surgical History:   Procedure Laterality Date    FRACTURE SURGERY  1992    Left elbow/metal plate in elbow    LAPAROSCOPY      lap for cyst on ovary and checked for endometreosis    Zac Coates    left    TONSILLECTOMY         CURRENTMEDICATIONS       Discharge Medication List as of 2/13/2023  4:08 PM        CONTINUE these medications which have NOT CHANGED    Details   ferrous sulfate (IRON 325) 325 (65 Fe) MG tablet Take 1 tablet by mouth daily (with breakfast), Disp-30 tablet, R-5Normal      omeprazole (PRILOSEC) 20 MG delayed release capsule Take 1 capsule by mouth twice daily, Disp-60 capsule, R-0Normal      levothyroxine (SYNTHROID) 125 MCG tablet Take 1 tablet by mouth once daily, Disp-30 tablet, R-2Normal      lidocaine (LIDODERM) 5 % Place 1 patch onto the skin daily May substitute for lidocaine 4% patch., Disp-10 patch, R-0Normal      Acetaminophen (TYLENOL PO) as neededHistorical Med      docusate sodium (LOVE STOOL SOFTENER) 100 MG capsule Take 1 capsule by mouth 2 times daily, Disp-30 capsule, R-3Please consider 90 day supplies to promote better adherenceNormal             ALLERGIES     Amoxicillin, Pcn [penicillins], Sulfa antibiotics, Benadryl [diphenhydramine hcl], and Prednisone    FAMILYHISTORY       Family History   Problem Relation Age of Onset    Depression Mother     High Cholesterol Mother     Asthma Sister     Depression Sister     High Blood Pressure Sister     Learning Disabilities Sister     Kidney Disease Maternal Aunt     Diabetes Maternal Grandmother     Early Death Maternal Grandmother     Hearing Loss Maternal Grandmother     Heart Disease Maternal Grandmother     High Blood Pressure Maternal Grandmother     Cancer Maternal Grandmother     High Cholesterol Maternal Grandmother     Hypertension Maternal Grandmother     Migraines Maternal Grandmother     Miscarriages / Stillbirths Paternal Grandmother     Thyroid Disease Father     Migraines Sister     Learning Disabilities Sister     Cancer Maternal Grandfather         SOCIAL HISTORY       Social History     Socioeconomic History    Marital status:      Spouse name: None    Number of children: None    Years of education: None    Highest education level: None   Tobacco Use    Smoking status: Never    Smokeless tobacco: Never    Tobacco comments:     reviewed 1/22/15   Vaping Use    Vaping Use: Never used   Substance and Sexual Activity    Alcohol use: No    Drug use: No    Sexual activity: Yes     Partners: Male     Comment: single     Social Determinants of Health     Financial Resource Strain: Low Risk     Difficulty of Paying Living Expenses: Not hard at all   Food Insecurity: No Food Insecurity    Worried About Running Out of Food in the Last Year: Never true    Ran Out of Food in the Last Year: Never true   Transportation Needs: Unknown Lack of Transportation (Non-Medical): No   Housing Stability: Unknown    Unstable Housing in the Last Year: No       SCREENINGS    [place] Coma Scale  Eye Opening: Spontaneous  Best Verbal Response: Oriented  Best Motor Response: Obeys commands  [place] Coma Scale Score: 15      PHYSICAL EXAM       ED Triage Vitals [02/13/23 1141]   BP Temp Temp Source Heart Rate Resp SpO2 Height Weight   (!) 139/108 98.4 °F (36.9 °C) Oral (!) 103 16 100 % 5' 2\" (1.575 m) 237 lb (107.5 kg)        Constitutional:  Well developed, Well nourished. No distress  HENT:  Normocephalic, Atraumatic, PERRL. EOMI. Moist mucus membranes. Neck/Lymphatics: supple, no swollen nodes  Cardiovascular:   Tachycardia, RRR,  no murmurs/rubs/gallops. Distal cap refill and pulses intact bilateral upper and lower extremities. no peripheral edema. Respiratory:   Nonlabored breathing. Normal breath sounds, No wheezing  Abdomen: Bowel sounds normal, Soft, No tenderness, no masses. Musculoskeletal:  Bilateral upper and lower extremity ROM intact without pain or obvious deficit. No calf tenderness, asymmetry, or tenderness. Integument:   Warm, Dry, No rashes.   Psychiatric:  Affect normal, Mood normal.     DIAGNOSTIC RESULTS   LABS:    Labs Reviewed   CBC WITH AUTO DIFFERENTIAL - Abnormal; Notable for the following components:       Result Value    MCHC 31.8 (*)     Segs Relative 71.6 (*)     Lymphocytes % 21.0 (*)     Monocytes % 6.3 (*)     All other components within normal limits   COMPREHENSIVE METABOLIC PANEL - Abnormal; Notable for the following components:    Sodium 131 (*)     Chloride 98 (*)     All other components within normal limits   URINALYSIS - Abnormal; Notable for the following components:    Leukocyte Esterase, Urine MODERATE NUMBER OR AMOUNT OBSERVED (*)     All other components within normal limits   MICROSCOPIC URINALYSIS - Abnormal; Notable for the following components:    Bacteria, UA MANY (*)     Mucus, UA RARE (*) All other components within normal limits   CULTURE, URINE    Narrative:     SETUP DATE/TIME:  02/13/2023 1841   LIPASE   MAGNESIUM   PROTIME-INR   TROPONIN   TSH   T4, FREE       When ordered, only abnormal lab results are displayed. All other labs were within normal range or not returned as of this dictation. EKG: When ordered, EKG's are interpreted by the Emergency Department Physician in the absence of a cardiologist.  Please see their note for interpretation of EKG. RADIOLOGY:   Non-plain film images such as CT, Ultrasound and MRI are read by the radiologist. Plain radiographic images are visualized and preliminarily interpreted by the  ED Provider with the below findings:    Interpretation [facility] Radiologist below, if available at the time of this note:    VL DUP LOWER EXTREMITY VENOUS LEFT   Final Result   No evidence of DVT in the left lower extremity. CTA PULMONARY W CONTRAST   Final Result   No evidence of pulmonary embolism or acute pulmonary abnormality. Stable 4.5 mm pulmonary nodule right upper lobe also previously seen on   October 28, 2022 and July 11, 2018. XR CHEST PORTABLE   Final Result   No acute cardiopulmonary process. No results found. PROCEDURES   Unless otherwise noted below, none         CRITICAL CARE   CRITICAL CARE NOTE:  N/A    CONSULTS:  None      VITALS:   Vitals:    Vitals:    02/13/23 1200 02/13/23 1215 02/13/23 1600 02/13/23 1611   BP: 127/82 114/89     Pulse: 94 96 92 88   Resp: 17 20 17 25   Temp:       TempSrc:       SpO2: 100% 100% 98%    Weight:       Height:           EMERGENCY DEPARTMENT COURSE and MDM:   Patient presents as above. Emergent etiologies considered. Patient seen and examined. Work-up initiated secondary to presentation, physical exam findings, vital signs and medical chart review.       Sepsis Consideration:   Exclusion criteria - the patient is NOT to be included for SEP-1 Core Measure due to:  2+ SIRS criteria are not met     History from : Patient    Limitations to history : None    Patient was given the following medications:  Medications   iopamidol (ISOVUE-370) 76 % injection 80 mL (80 mLs IntraVENous Given 2/13/23 1413)       Independent Imaging Interpretation by me: None    EKG (if obtained): ECG showed NSR and no changes concerning for ACS. See attending note for complete interpretation. Chronic conditions affecting care: hypothyroidism    Discussion with Other Profesionals : None    Social Determinants : None    Records Reviewed : None    In brief, patient presented for evaluation of chest pain that started yesterday as well as fatigue for the last couple of weeks and left lower quadrant abdominal pain. Patient has a history of DVT/PE and is not presently anticoagulated. I was concerned about the possibility of DVT given the left lower quadrant and groin pain as well as chest pain. Patient also had been fatigued for the last several weeks. Consider the possibility of thyroid dysfunction given history of hypothyroidism. Differential diagnosis also included UTI, pyelonephritis, muscle strain, ACS, pneumonia, ovarian torsion or cyst.  Laboratory studies including a CMP and CBC showed mild hyponatremia with a sodium of 131. Lipase was normal.  Magnesium was normal.  Troponin was negative. Thyroid function was within normal range. Urinalysis did appear concerning for possible infection as there were many bacteria on the microscopic evaluation without any WBC. Given patient's symptoms of left lower quadrant pain I was concerned about the possibility of UTI and thus elected to treat her with Macrobid. A left lower extremity duplex revealed no evidence of DVT. A CTA of the chest was performed to rule out PE and it was normal.  Chest x-ray demonstrated no acute infiltrate, effusion, edema, or mediastinal widening. Discussed results with patient.   Advised her that I am unsure what is causing her chest pain today but no evidence of emergent causes on today's exam.  Encourage follow-up with primary care regarding this and her persistent fatigue. We will treat for UTI as above. Urine culture sent and will call with a couple days results. Patient verbalized understanding and agreement and feels comfortable discharge at this time. I am the Primary Clinician of Record. CLINICAL IMPRESSION      1. Acute cystitis without hematuria    2.  Chest pain, unspecified type          DISPOSITION/PLAN   DISPOSITION Decision To Discharge 02/13/2023 03:27:31 PM      PATIENT REFERREDTO:  BURTON Johnston CNP  [place] [id number]  [place] [id number]  [phone]      2-3 days    DISCHARGE MEDICATIONS:  Discharge Medication List as of 2/13/2023  4:08 PM        START taking these medications    Details   nitrofurantoin, macrocrystal-monohydrate, (MACROBID) 100 MG capsule Take 1 capsule by mouth 2 times daily for 7 days, Disp-14 capsule, R-0Normal             DISCONTINUED MEDICATIONS:  Discharge Medication List as of 2/13/2023  4:08 PM                 (Please note that portions ofthis note were completed with a voice recognition program.  Efforts were made to edit the dictations but occasionally words are mis-transcribed.)    BURTON Rodriguez CNP (electronically signed)              BURTON Harley CNP  02/15/23 5086

## 2023-02-14 ENCOUNTER — OFFICE VISIT (OUTPATIENT)
Dept: INTERNAL MEDICINE CLINIC | Age: 33
End: 2023-02-14

## 2023-02-14 VITALS
OXYGEN SATURATION: 97 % | SYSTOLIC BLOOD PRESSURE: 122 MMHG | HEIGHT: 62 IN | RESPIRATION RATE: 18 BRPM | WEIGHT: 226 LBS | HEART RATE: 71 BPM | DIASTOLIC BLOOD PRESSURE: 78 MMHG | BODY MASS INDEX: 41.59 KG/M2

## 2023-02-14 DIAGNOSIS — R42 DIZZINESS: Primary | ICD-10-CM

## 2023-02-14 DIAGNOSIS — N30.00 ACUTE CYSTITIS WITHOUT HEMATURIA: ICD-10-CM

## 2023-02-14 LAB
CULTURE: NORMAL
Lab: NORMAL
SPECIMEN: NORMAL

## 2023-02-14 PROCEDURE — 99214 OFFICE O/P EST MOD 30 MIN: CPT | Performed by: NURSE PRACTITIONER

## 2023-02-14 RX ORDER — MECLIZINE HCL 12.5 MG/1
12.5 TABLET ORAL 3 TIMES DAILY PRN
Qty: 30 TABLET | Refills: 0 | Status: SHIPPED | OUTPATIENT
Start: 2023-02-14 | End: 2023-02-24

## 2023-02-14 ASSESSMENT — ENCOUNTER SYMPTOMS
COUGH: 0
SINUS PAIN: 0
COLOR CHANGE: 0
VOMITING: 0
ABDOMINAL PAIN: 0
DIARRHEA: 0
APNEA: 0
NAUSEA: 0
CHEST TIGHTNESS: 0
SHORTNESS OF BREATH: 0
SINUS PRESSURE: 0

## 2023-02-14 NOTE — PROGRESS NOTES
Su Victoria ANH Clos  1990 02/14/23    Chief Complaint   Patient presents with    6 Month Follow-Up    Dizziness       SUBJECTIVE:      Su Victoria presents to the office this morning for ER follow-up. In brief, she states over the last week she has had intermittent issues with dizziness, back pain and generally feeling \"off. \"  Did contact our office as well as her hematologist yesterday as she does have history of PE and has been off of oral anti- coagulation therapy since last summer and was concerned that her symptoms felt similar to previous PE symptoms. Work-up was essentially unremarkable in the ER including negative chest x-ray and CTA of the chest.  There is no concerns for PE noted however an incidental finding of a very small subcentimeter lung nodule which radiology reports has been stable dating back to 2018. She denies any chest pain or shortness of breath currently but does states she occasionally feels dizzy with quick positional changes and setting up too fast.  Was diagnosed with acute cystitis with hematuria and started on Macrobid which she plans to start today. Urine culture still pending at this time. Review of Systems   Constitutional:  Negative for activity change, appetite change, fatigue and fever. HENT:  Negative for congestion, nosebleeds, sinus pressure and sinus pain. Respiratory:  Negative for apnea, cough, chest tightness and shortness of breath. Cardiovascular:  Negative for chest pain and palpitations. Gastrointestinal:  Negative for abdominal pain, diarrhea, nausea and vomiting. Genitourinary:  Negative for difficulty urinating, flank pain and hematuria. Musculoskeletal:  Negative for arthralgias, joint swelling and myalgias. Skin:  Negative for color change and rash. Neurological:  Positive for dizziness. Negative for seizures, syncope, speech difficulty, light-headedness and headaches. Psychiatric/Behavioral: Negative. Negative for behavioral problems. OBJECTIVE:    /78   Pulse 71   Resp 18   Ht 5' 2\" (1.575 m)   Wt 226 lb (102.5 kg)   SpO2 97%   BMI 41.34 kg/m²     Physical Exam  Constitutional:       General: She is not in acute distress. Appearance: She is well-developed. She is not diaphoretic. HENT:      Head: Normocephalic and atraumatic. Nose: Nose normal.      Mouth/Throat:      Pharynx: No oropharyngeal exudate. Cardiovascular:      Rate and Rhythm: Normal rate and regular rhythm. Heart sounds: Normal heart sounds. No murmur heard. No friction rub. Pulmonary:      Effort: Pulmonary effort is normal. No respiratory distress. Breath sounds: Normal breath sounds. Abdominal:      General: Bowel sounds are normal.      Palpations: Abdomen is soft. Tenderness: There is no abdominal tenderness. Musculoskeletal:         General: Normal range of motion. Cervical back: Normal range of motion and neck supple. No edema or erythema. No muscular tenderness. Skin:     General: Skin is warm and dry. Capillary Refill: Capillary refill takes less than 2 seconds. Findings: No erythema or rash. Neurological:      Mental Status: She is alert and oriented to person, place, and time. Cranial Nerves: No cranial nerve deficit. Coordination: Coordination normal.      Deep Tendon Reflexes: Reflexes normal.      Comments: Mildly positive Hollywood-Hallpike, right   Psychiatric:         Thought Content: Thought content normal.       ASSESSMENT:    1. Dizziness    2. Acute cystitis without hematuria        PLAN:    Aly Willis was seen today for 6 month follow-up and dizziness. Diagnoses and all orders for this visit:    Dizziness-etiology remains unclear and neurological exam is essentially unremarkable. She does have a very mildly positive Dev-Hallpike maneuver on the right side suggestive of possible mild BPPV. Work-up thus far has otherwise been unremarkable.   Will provide as needed meclizine and also home exercises for Epley maneuvers and if no improvement over the next 1 to 2 weeks, she will call for ENT consult. Otherwise, the rest of her symptoms are very vague and could possibly be related to a UTI, however I would like to see how the urine culture pans out and her response to antibiotic therapy over the next week. -     meclizine (ANTIVERT) 12.5 MG tablet; Take 1 tablet by mouth 3 times daily as needed for Dizziness    Acute cystitis without hematuria-as above. Start Katrinka Negus. Culture pending. Please note this reports has been produced speech recognition software and may contain errors related to that system including errors in grammar, punctuation, and spelling, as well as words and phrases that may be appropriate. If there are any questions or concerns please feel free to contact the dictating provider for clarification. No flowsheet data found. Return in about 6 months (around 8/14/2023). Pleas note this reports has been produced speech recognition software and may contain errors related to that system including errors in grammar, punctuation, and spelling, as well as words and phrases that may be appropriate. If there are any questions or concerns please feel free to contact the dictating provider for clarification.

## 2023-02-15 ENCOUNTER — TELEPHONE (OUTPATIENT)
Dept: PHARMACY | Age: 33
End: 2023-02-15

## 2023-02-15 NOTE — TELEPHONE ENCOUNTER
Pharmacy Note  ED Culture Follow-up    Marlin Us is a 28 y.o. female. Allergies: Amoxicillin, Pcn [penicillins], Sulfa antibiotics, Benadryl [diphenhydramine hcl], and Prednisone     Labs:  Lab Results   Component Value Date    BUN 10 02/13/2023    CREATININE 0.7 02/13/2023    WBC 5.9 02/13/2023     Estimated Creatinine Clearance: 131 mL/min (based on SCr of 0.7 mg/dL). Current antimicrobials:   Nitrofurantoin 100 mg by mouth twice daily for 7 days     ASSESSMENT:  Micro results:   Urine culture no growth at 18 to 36 hours     PLAN:  Need for intervention: Yes  Discussed with: Dr. Jaun Ramirez treatment:    Informed patient of negative urine culture and instructed patient to discontinue nitrofurantoin. Patient response:   Called and spoke with patient. Counseled patient on following up with PCP    Called/sent in prescription to: Not applicable    Please call with any questions.  Kira Melchor [facility], PharmD 4:47 PM 2/15/2023

## 2023-02-15 NOTE — PROGRESS NOTES
Pharmacy Note  ED Culture Follow-up    Adenike Orellana is a 28 y.o. female. Allergies: Amoxicillin, Pcn [penicillins], Sulfa antibiotics, Benadryl [diphenhydramine hcl], and Prednisone     Labs:  Lab Results   Component Value Date    BUN 10 02/13/2023    CREATININE 0.7 02/13/2023    WBC 5.9 02/13/2023     Estimated Creatinine Clearance: 131 mL/min (based on SCr of 0.7 mg/dL). Current antimicrobials:   Nitrofurantoin 100 mg by mouth twice daily for 7 days     ASSESSMENT:  Micro results:   Urine culture no growth at 18 to 36 hours     PLAN:  Need for intervention: Yes  Discussed with: Dr. Radha Arita treatment:    Informed patient of negative urine culture and instructed patient to discontinue nitrofurantoin. Patient response:   Called and spoke with patient. Counseled patient on following up with PCP    Called/sent in prescription to: Not applicable    Please call with any questions.  Darrell Clarke, [facility], PharmD 4:47 PM 2/15/2023

## 2023-03-22 DIAGNOSIS — E07.9 THYROID DISEASE: ICD-10-CM

## 2023-03-22 RX ORDER — LEVOTHYROXINE SODIUM 0.12 MG/1
TABLET ORAL
Qty: 30 TABLET | Refills: 0 | Status: SHIPPED | OUTPATIENT
Start: 2023-03-22

## 2023-04-21 DIAGNOSIS — E07.9 THYROID DISEASE: ICD-10-CM

## 2023-04-21 RX ORDER — LEVOTHYROXINE SODIUM 0.12 MG/1
TABLET ORAL
Qty: 30 TABLET | Refills: 3 | Status: SHIPPED | OUTPATIENT
Start: 2023-04-21

## 2023-05-11 ENCOUNTER — TELEMEDICINE (OUTPATIENT)
Dept: INTERNAL MEDICINE CLINIC | Age: 33
End: 2023-05-11

## 2023-05-11 DIAGNOSIS — J02.9 PHARYNGITIS, UNSPECIFIED ETIOLOGY: Primary | ICD-10-CM

## 2023-05-11 PROCEDURE — 99213 OFFICE O/P EST LOW 20 MIN: CPT | Performed by: NURSE PRACTITIONER

## 2023-05-11 RX ORDER — CEFDINIR 300 MG/1
300 CAPSULE ORAL 2 TIMES DAILY
Qty: 14 CAPSULE | Refills: 0 | Status: SHIPPED | OUTPATIENT
Start: 2023-05-11 | End: 2023-05-18

## 2023-05-11 RX ORDER — LIDOCAINE HYDROCHLORIDE 20 MG/ML
10 SOLUTION OROPHARYNGEAL 2 TIMES DAILY PRN
Qty: 100 ML | Refills: 0 | Status: SHIPPED | OUTPATIENT
Start: 2023-05-11 | End: 2023-05-16

## 2023-05-11 ASSESSMENT — ENCOUNTER SYMPTOMS
RHINORRHEA: 1
ABDOMINAL PAIN: 0
CHEST TIGHTNESS: 0
NAUSEA: 0
SINUS PRESSURE: 0
COLOR CHANGE: 0
APNEA: 0
DIARRHEA: 0
SORE THROAT: 1
SHORTNESS OF BREATH: 0
COUGH: 0
VOMITING: 0
SINUS PAIN: 0

## 2023-05-11 NOTE — PROGRESS NOTES
motor function) (limited exam to video visit)          [x] No gaze palsy        [] Abnormal-         Skin:        [x] No significant exanthematous lesions or discoloration noted on facial skin         [] Abnormal-            Psychiatric:       [x] Normal Affect [] No Hallucinations        [] Abnormal-     Other pertinent observable physical exam findings-     ASSESSMENT/PLAN:  1. Pharyngitis, unspecified etiology- seems viral. Continue current treatment, but add PRN viscous lidocaine. Start ATBX if no improvement by Monday. - cefdinir (OMNICEF) 300 MG capsule; Take 1 capsule by mouth 2 times daily for 7 days  Dispense: 14 capsule; Refill: 0  - lidocaine viscous hcl (XYLOCAINE) 2 % SOLN solution; Take 10 mLs by mouth 2 times daily as needed for Irritation  Dispense: 100 mL; Refill: 0    No follow-ups on file. Janiya Pérez, was evaluated through a synchronous (real-time) audio-video encounter. The patient (or guardian if applicable) is aware that this is a billable service, which includes applicable co-pays. This Virtual Visit was conducted with patient's (and/or legal guardian's) consent. Patient identification was verified, and a caregiver was present when appropriate. The patient was located at Home: [address]  Provider was located at [place] [id number]): [address]        Total time spent on this encounter:  15 minutes    --BURTON Taylor CNP on 5/11/2023 at 10:43 AM    An electronic signature was used to authenticate this note.

## 2023-05-23 ENCOUNTER — CLINICAL DOCUMENTATION (OUTPATIENT)
Dept: ONCOLOGY | Age: 33
End: 2023-05-23

## 2023-05-23 NOTE — PROGRESS NOTES
This nurse received a call from the patient reporting bilateral ankle and foot swelling. The patient denies pain or warmth to one side and not the other. The patient reports that she has notified her PCP who advised compression hose and elevating her feet. This nurse notified the patient to follow those recommendations as well as decreasing sodium intake. The patient was advised to call this office if no improvement in symptoms.

## 2023-07-06 ENCOUNTER — OFFICE VISIT (OUTPATIENT)
Dept: INTERNAL MEDICINE CLINIC | Age: 33
End: 2023-07-06

## 2023-07-06 VITALS
DIASTOLIC BLOOD PRESSURE: 82 MMHG | SYSTOLIC BLOOD PRESSURE: 128 MMHG | HEART RATE: 77 BPM | BODY MASS INDEX: 43.97 KG/M2 | RESPIRATION RATE: 14 BRPM | WEIGHT: 240.4 LBS

## 2023-07-06 DIAGNOSIS — R30.0 DYSURIA: ICD-10-CM

## 2023-07-06 DIAGNOSIS — R21 RASH: Primary | ICD-10-CM

## 2023-07-06 LAB
BILIRUBIN, POC: NORMAL
BLOOD URINE, POC: NEGATIVE
CLARITY, POC: NORMAL
COLOR, POC: NORMAL
GLUCOSE URINE, POC: NEGATIVE
KETONES, POC: NEGATIVE
LEUKOCYTE EST, POC: NEGATIVE
NITRITE, POC: NEGATIVE
PH, POC: 5.5
PROTEIN, POC: NEGATIVE
SPECIFIC GRAVITY, POC: >=1.03
UROBILINOGEN, POC: NORMAL

## 2023-07-06 PROCEDURE — 99214 OFFICE O/P EST MOD 30 MIN: CPT | Performed by: NURSE PRACTITIONER

## 2023-07-06 PROCEDURE — 81002 URINALYSIS NONAUTO W/O SCOPE: CPT | Performed by: NURSE PRACTITIONER

## 2023-07-06 RX ORDER — CEPHALEXIN 500 MG/1
500 CAPSULE ORAL 3 TIMES DAILY
Qty: 21 CAPSULE | Refills: 0 | Status: SHIPPED | OUTPATIENT
Start: 2023-07-06 | End: 2023-07-13

## 2023-07-06 ASSESSMENT — ENCOUNTER SYMPTOMS
DIARRHEA: 0
COLOR CHANGE: 0
ABDOMINAL PAIN: 0
VOMITING: 0
CHEST TIGHTNESS: 0
NAUSEA: 0
COUGH: 0
APNEA: 0
SHORTNESS OF BREATH: 0
SINUS PAIN: 0
SINUS PRESSURE: 0

## 2023-07-08 LAB — BACTERIA UR CULT: NORMAL

## 2023-07-09 PROBLEM — D64.9 ANEMIA: Status: ACTIVE | Noted: 2023-07-09

## 2023-07-09 NOTE — PROGRESS NOTES
Patient Name:  J Luis Ji  Patient :  1990  Patient MRN:  [id number]     Primary Oncologist: Penny Shirley MD  Referring Provider: BURTON Toney CNP     Date of Service: 2023      Chief Complaint:    Chief Complaint   Patient presents with    Follow-up     Patient Active Problem List:     Fibrocystic breast disease     Thyroid disease     Sinus arrhythmia     Vitamin D deficiency     Multiple subsegmental pulmonary emboli without acute cor pulmonale (HCC)    HPI:   Marquis Cramp. Briana Orr is a 59-year-old very pleasant female with medical history significant for hypothyroidism, fibrocystic breast disease and vitamin D deficiency, referred to me on 2022 for evaluation of unprovoked pulmonary embolism. She initially presented to [facility] ED on 3/27/22 with chest pain and SOB. CTA pulmonary done on 3/27/2022 showed slightly suboptimal study. Findings highly suspicious for left-sided pulmonary emboli. Mild dilatation of the main pulmonary trunk, which can be seen in pulmonary arterial hypertension. She was started with eliquis since then. Her tightness of chest has improved some and she still has some chest pain. She denies sedentary life style, prolong immobilization, use of oral birth control. One of her half sister has VTE. She doesn't know much about her father side family history. All the hypercoagulable work ups done on 22 were normal.    CTA chest on 23 showed no evidence of pulmonary embolism or acute pulmonary abnormality. Stable 4.5 mm pulmonary nodule right upper lobe also previously seen on 2022 and 2018. On 2023, she presented to me for follow-up. She was initially referred to me for evaluation of unprovoked venous thromboembolism. All the hypercoagulable work-ups were within normal range.   Since all the hypercoagulable work-ups were normal and she only has mild clot burden only, I recommend for total 4 months of anticoagulation

## 2023-07-20 ENCOUNTER — HOSPITAL ENCOUNTER (OUTPATIENT)
Dept: INFUSION THERAPY | Age: 33
Discharge: HOME | End: 2023-07-20

## 2023-07-20 DIAGNOSIS — I26.94 MULTIPLE SUBSEGMENTAL PULMONARY EMBOLI WITHOUT ACUTE COR PULMONALE (HCC): ICD-10-CM

## 2023-07-20 DIAGNOSIS — D64.9 ANEMIA, UNSPECIFIED TYPE: ICD-10-CM

## 2023-07-20 LAB
ALBUMIN SERPL-MCNC: 4.4 GM/DL (ref 3.4–5)
ALP BLD-CCNC: 71 IU/L (ref 40–129)
ALT SERPL-CCNC: 14 U/L (ref 10–40)
ANION GAP SERPL CALCULATED.3IONS-SCNC: 10 MMOL/L (ref 4–16)
AST SERPL-CCNC: 13 IU/L (ref 15–37)
BASOPHILS ABSOLUTE: 0 K/CU MM
BASOPHILS RELATIVE PERCENT: 0.4 % (ref 0–1)
BILIRUB SERPL-MCNC: 0.4 MG/DL (ref 0–1)
BUN SERPL-MCNC: 10 MG/DL (ref 6–23)
CALCIUM SERPL-MCNC: 9 MG/DL (ref 8.3–10.6)
CHLORIDE BLD-SCNC: 101 MMOL/L (ref 99–110)
CO2: 27 MMOL/L (ref 21–32)
CREAT SERPL-MCNC: 0.7 MG/DL (ref 0.6–1.1)
D DIMER: 0.64 UG/ML (FEU)
DIFFERENTIAL TYPE: ABNORMAL
EOSINOPHILS ABSOLUTE: 0.1 K/CU MM
EOSINOPHILS RELATIVE PERCENT: 1.4 % (ref 0–3)
ERYTHROCYTE SEDIMENTATION RATE: 12 MM/HR (ref 0–20)
FERRITIN: 52 NG/ML (ref 15–150)
FOLATE SERPL-MCNC: 13.1 NG/ML (ref 3.1–17.5)
GFR SERPL CREATININE-BSD FRML MDRD: >60 ML/MIN/1.73M2
GLUCOSE SERPL-MCNC: 105 MG/DL (ref 70–99)
HCT VFR BLD CALC: 38.4 % (ref 37–47)
HEMOGLOBIN: 12.6 GM/DL (ref 12.5–16)
IRON: 57 UG/DL (ref 37–145)
LACTATE DEHYDROGENASE: 196 IU/L (ref 120–246)
LYMPHOCYTES ABSOLUTE: 1.2 K/CU MM
LYMPHOCYTES RELATIVE PERCENT: 23.6 % (ref 24–44)
MCH RBC QN AUTO: 30.7 PG (ref 27–31)
MCHC RBC AUTO-ENTMCNC: 32.8 % (ref 32–36)
MCV RBC AUTO: 93.7 FL (ref 78–100)
MONOCYTES ABSOLUTE: 0.3 K/CU MM
MONOCYTES RELATIVE PERCENT: 6 % (ref 0–4)
PCT TRANSFERRIN: 20 % (ref 10–44)
PDW BLD-RTO: 13 % (ref 11.7–14.9)
PLATELET # BLD: 286 K/CU MM (ref 140–440)
PMV BLD AUTO: 9.5 FL (ref 7.5–11.1)
POTASSIUM SERPL-SCNC: 3.8 MMOL/L (ref 3.5–5.1)
RBC # BLD: 4.1 M/CU MM (ref 4.2–5.4)
RETICULOCYTE COUNT PCT: 1.9 % (ref 0.2–2.2)
SEGMENTED NEUTROPHILS ABSOLUTE COUNT: 3.6 K/CU MM
SEGMENTED NEUTROPHILS RELATIVE PERCENT: 68.6 % (ref 36–66)
SODIUM BLD-SCNC: 138 MMOL/L (ref 135–145)
TOTAL IRON BINDING CAPACITY: 282 UG/DL (ref 250–450)
TOTAL PROTEIN: 6.5 GM/DL (ref 6.4–8.2)
TSH SERPL DL<=0.005 MIU/L-ACNC: 3.52 UIU/ML (ref 0.27–4.2)
UNSATURATED IRON BINDING CAPACITY: 225 UG/DL (ref 110–370)
VITAMIN B-12: 344.4 PG/ML (ref 211–911)
WBC # BLD: 5.2 K/CU MM (ref 4–10.5)

## 2023-07-20 PROCEDURE — 85652 RBC SED RATE AUTOMATED: CPT

## 2023-07-20 PROCEDURE — 80053 COMPREHEN METABOLIC PANEL: CPT

## 2023-07-20 PROCEDURE — 84443 ASSAY THYROID STIM HORMONE: CPT

## 2023-07-20 PROCEDURE — 85045 AUTOMATED RETICULOCYTE COUNT: CPT

## 2023-07-20 PROCEDURE — 85379 FIBRIN DEGRADATION QUANT: CPT

## 2023-07-20 PROCEDURE — 83550 IRON BINDING TEST: CPT

## 2023-07-20 PROCEDURE — 83615 LACTATE (LD) (LDH) ENZYME: CPT

## 2023-07-20 PROCEDURE — 85025 COMPLETE CBC W/AUTO DIFF WBC: CPT

## 2023-07-20 PROCEDURE — 83540 ASSAY OF IRON: CPT

## 2023-07-20 PROCEDURE — 83010 ASSAY OF HAPTOGLOBIN QUANT: CPT

## 2023-07-20 PROCEDURE — 36415 COLL VENOUS BLD VENIPUNCTURE: CPT

## 2023-07-20 PROCEDURE — 82746 ASSAY OF FOLIC ACID SERUM: CPT

## 2023-07-20 PROCEDURE — 82607 VITAMIN B-12: CPT

## 2023-07-20 PROCEDURE — 82728 ASSAY OF FERRITIN: CPT

## 2023-07-21 LAB — HAPTOGLOB SERPL-MCNC: 157 MG/DL (ref 30–200)

## 2023-07-27 ENCOUNTER — OFFICE VISIT (OUTPATIENT)
Dept: ONCOLOGY | Age: 33
End: 2023-07-27

## 2023-07-27 ENCOUNTER — HOSPITAL ENCOUNTER (OUTPATIENT)
Age: 33
Discharge: HOME | End: 2023-07-27

## 2023-07-27 ENCOUNTER — CLINICAL DOCUMENTATION (OUTPATIENT)
Dept: ONCOLOGY | Age: 33
End: 2023-07-27

## 2023-07-27 ENCOUNTER — HOSPITAL ENCOUNTER (OUTPATIENT)
Dept: INFUSION THERAPY | Age: 33
Discharge: HOME | End: 2023-07-27

## 2023-07-27 ENCOUNTER — HOSPITAL ENCOUNTER (OUTPATIENT)
Dept: GENERAL RADIOLOGY | Age: 33
Discharge: HOME | End: 2023-07-27

## 2023-07-27 VITALS
HEIGHT: 62 IN | OXYGEN SATURATION: 100 % | SYSTOLIC BLOOD PRESSURE: 155 MMHG | WEIGHT: 238.6 LBS | HEART RATE: 75 BPM | DIASTOLIC BLOOD PRESSURE: 97 MMHG | BODY MASS INDEX: 43.91 KG/M2 | TEMPERATURE: 98.2 F

## 2023-07-27 DIAGNOSIS — I26.94 MULTIPLE SUBSEGMENTAL PULMONARY EMBOLI WITHOUT ACUTE COR PULMONALE (HCC): Primary | ICD-10-CM

## 2023-07-27 DIAGNOSIS — D64.9 ANEMIA, UNSPECIFIED TYPE: ICD-10-CM

## 2023-07-27 DIAGNOSIS — R10.9 FLANK PAIN: ICD-10-CM

## 2023-07-27 DIAGNOSIS — I26.94 MULTIPLE SUBSEGMENTAL PULMONARY EMBOLI WITHOUT ACUTE COR PULMONALE (HCC): ICD-10-CM

## 2023-07-27 PROCEDURE — 74018 RADEX ABDOMEN 1 VIEW: CPT

## 2023-07-27 PROCEDURE — 99213 OFFICE O/P EST LOW 20 MIN: CPT | Performed by: INTERNAL MEDICINE

## 2023-07-27 PROCEDURE — 99211 OFF/OP EST MAY X REQ PHY/QHP: CPT

## 2023-07-27 ASSESSMENT — PATIENT HEALTH QUESTIONNAIRE - PHQ9
SUM OF ALL RESPONSES TO PHQ QUESTIONS 1-9: 0
SUM OF ALL RESPONSES TO PHQ9 QUESTIONS 1 & 2: 0
2. FEELING DOWN, DEPRESSED OR HOPELESS: 0
1. LITTLE INTEREST OR PLEASURE IN DOING THINGS: 0
SUM OF ALL RESPONSES TO PHQ QUESTIONS 1-9: 0

## 2023-07-27 NOTE — PROGRESS NOTES
MA Rooming Questions  Patient: Wero Aguilar  MRN: [id number]    Date: 7/27/2023        1. Do you have any new issues? yes - Fatigue , Appetite is off           2. Do you need any refills on medications?    no    3. Have you had any imaging done since your last visit? yes - CTA , Xray , VL Dup      4. Have you been hospitalized or seen in the emergency room since your last visit here?   yes - 2/13    5. Did the patient have a depression screening completed today?  Yes    No data recorded     PHQ-9 Given to (if applicable):               PHQ-9 Score (if applicable):                     [] Positive     []  Negative              Does question #9 need addressed (if applicable)                     [] Yes    []  No               Sarath Mcgowan MA

## 2023-07-28 ENCOUNTER — APPOINTMENT (OUTPATIENT)
Dept: CT IMAGING | Age: 33
End: 2023-07-28

## 2023-07-28 ENCOUNTER — APPOINTMENT (OUTPATIENT)
Dept: GENERAL RADIOLOGY | Age: 33
End: 2023-07-28

## 2023-07-28 ENCOUNTER — HOSPITAL ENCOUNTER (EMERGENCY)
Age: 33
Discharge: HOME | End: 2023-07-28

## 2023-07-28 ENCOUNTER — APPOINTMENT (OUTPATIENT)
Dept: ULTRASOUND IMAGING | Age: 33
End: 2023-07-28

## 2023-07-28 VITALS
RESPIRATION RATE: 17 BRPM | DIASTOLIC BLOOD PRESSURE: 76 MMHG | SYSTOLIC BLOOD PRESSURE: 143 MMHG | TEMPERATURE: 97.8 F | HEART RATE: 55 BPM | OXYGEN SATURATION: 100 %

## 2023-07-28 DIAGNOSIS — I82.A12 ACUTE DEEP VEIN THROMBOSIS (DVT) OF AXILLARY VEIN OF LEFT UPPER EXTREMITY (HCC): Primary | ICD-10-CM

## 2023-07-28 DIAGNOSIS — R07.9 CHEST PAIN, UNSPECIFIED TYPE: ICD-10-CM

## 2023-07-28 DIAGNOSIS — M54.50 LOW BACK PAIN, UNSPECIFIED BACK PAIN LATERALITY, UNSPECIFIED CHRONICITY, UNSPECIFIED WHETHER SCIATICA PRESENT: ICD-10-CM

## 2023-07-28 LAB
ALBUMIN SERPL-MCNC: 4.8 GM/DL (ref 3.4–5)
ALP BLD-CCNC: 74 IU/L (ref 40–129)
ALT SERPL-CCNC: 16 U/L (ref 10–40)
ANION GAP SERPL CALCULATED.3IONS-SCNC: 9 MMOL/L (ref 4–16)
AST SERPL-CCNC: 12 IU/L (ref 15–37)
BASOPHILS ABSOLUTE: 0 K/CU MM
BASOPHILS RELATIVE PERCENT: 0.8 % (ref 0–1)
BILIRUB SERPL-MCNC: 0.5 MG/DL (ref 0–1)
BILIRUBIN URINE: NEGATIVE MG/DL
BLOOD, URINE: NEGATIVE
BUN SERPL-MCNC: 10 MG/DL (ref 6–23)
CALCIUM SERPL-MCNC: 9.4 MG/DL (ref 8.3–10.6)
CHLORIDE BLD-SCNC: 103 MMOL/L (ref 99–110)
CLARITY: CLEAR
CO2: 28 MMOL/L (ref 21–32)
COLOR: YELLOW
COMMENT UA: NORMAL
CREAT SERPL-MCNC: 0.9 MG/DL (ref 0.6–1.1)
D DIMER: 0.35 UG/ML (FEU)
DIFFERENTIAL TYPE: ABNORMAL
EKG ATRIAL RATE: 63 BPM
EKG DIAGNOSIS: NORMAL
EKG P AXIS: 19 DEGREES
EKG P-R INTERVAL: 142 MS
EKG Q-T INTERVAL: 396 MS
EKG QRS DURATION: 84 MS
EKG QTC CALCULATION (BAZETT): 405 MS
EKG R AXIS: 6 DEGREES
EKG T AXIS: -2 DEGREES
EKG VENTRICULAR RATE: 63 BPM
EOSINOPHILS ABSOLUTE: 0 K/CU MM
EOSINOPHILS RELATIVE PERCENT: 0.6 % (ref 0–3)
GFR SERPL CREATININE-BSD FRML MDRD: >60 ML/MIN/1.73M2
GLUCOSE SERPL-MCNC: 96 MG/DL (ref 70–99)
GLUCOSE, URINE: NEGATIVE MG/DL
HCT VFR BLD CALC: 41.5 % (ref 37–47)
HEMOGLOBIN: 13.1 GM/DL (ref 12.5–16)
IMMATURE NEUTROPHIL %: 0.4 % (ref 0–0.43)
INTERPRETATION: NORMAL
KETONES, URINE: NEGATIVE MG/DL
LEUKOCYTE ESTERASE, URINE: NEGATIVE
LYMPHOCYTES ABSOLUTE: 1.3 K/CU MM
LYMPHOCYTES RELATIVE PERCENT: 25 % (ref 24–44)
MAGNESIUM: 2.1 MG/DL (ref 1.8–2.4)
MCH RBC QN AUTO: 29.7 PG (ref 27–31)
MCHC RBC AUTO-ENTMCNC: 31.6 % (ref 32–36)
MCV RBC AUTO: 94.1 FL (ref 78–100)
MONOCYTES ABSOLUTE: 0.3 K/CU MM
MONOCYTES RELATIVE PERCENT: 5.2 % (ref 0–4)
NITRITE URINE, QUANTITATIVE: NEGATIVE
NUCLEATED RBC %: 0 %
PDW BLD-RTO: 12.4 % (ref 11.7–14.9)
PH, URINE: 6 (ref 5–8)
PLATELET # BLD: 310 K/CU MM (ref 140–440)
PMV BLD AUTO: 9.4 FL (ref 7.5–11.1)
POTASSIUM SERPL-SCNC: 4 MMOL/L (ref 3.5–5.1)
PREGNANCY, URINE: NEGATIVE
PROTEIN UA: NEGATIVE MG/DL
RBC # BLD: 4.41 M/CU MM (ref 4.2–5.4)
SEGMENTED NEUTROPHILS ABSOLUTE COUNT: 3.5 K/CU MM
SEGMENTED NEUTROPHILS RELATIVE PERCENT: 68 % (ref 36–66)
SODIUM BLD-SCNC: 140 MMOL/L (ref 135–145)
SPECIFIC GRAVITY UA: <1.005 (ref 1–1.03)
TOTAL IMMATURE NEUTOROPHIL: 0.02 K/CU MM
TOTAL NUCLEATED RBC: 0 K/CU MM
TOTAL PROTEIN: 7.1 GM/DL (ref 6.4–8.2)
TROPONIN T: <0.01 NG/ML
TSH SERPL DL<=0.005 MIU/L-ACNC: 4.04 UIU/ML (ref 0.27–4.2)
UROBILINOGEN, URINE: 0.2 MG/DL (ref 0.2–1)
WBC # BLD: 5.2 K/CU MM (ref 4–10.5)

## 2023-07-28 PROCEDURE — 80053 COMPREHEN METABOLIC PANEL: CPT

## 2023-07-28 PROCEDURE — 99285 EMERGENCY DEPT VISIT HI MDM: CPT

## 2023-07-28 PROCEDURE — 84443 ASSAY THYROID STIM HORMONE: CPT

## 2023-07-28 PROCEDURE — 71275 CT ANGIOGRAPHY CHEST: CPT

## 2023-07-28 PROCEDURE — 84484 ASSAY OF TROPONIN QUANT: CPT

## 2023-07-28 PROCEDURE — 6360000004 HC RX CONTRAST MEDICATION: Performed by: PHYSICIAN ASSISTANT

## 2023-07-28 PROCEDURE — 83735 ASSAY OF MAGNESIUM: CPT

## 2023-07-28 PROCEDURE — 6360000002 HC RX W HCPCS: Performed by: PHYSICIAN ASSISTANT

## 2023-07-28 PROCEDURE — 93971 EXTREMITY STUDY: CPT

## 2023-07-28 PROCEDURE — 85025 COMPLETE CBC W/AUTO DIFF WBC: CPT

## 2023-07-28 PROCEDURE — 2580000003 HC RX 258: Performed by: PHYSICIAN ASSISTANT

## 2023-07-28 PROCEDURE — 96374 THER/PROPH/DIAG INJ IV PUSH: CPT

## 2023-07-28 PROCEDURE — 71045 X-RAY EXAM CHEST 1 VIEW: CPT

## 2023-07-28 PROCEDURE — 93010 ELECTROCARDIOGRAM REPORT: CPT | Performed by: INTERNAL MEDICINE

## 2023-07-28 PROCEDURE — 81003 URINALYSIS AUTO W/O SCOPE: CPT

## 2023-07-28 PROCEDURE — 6370000000 HC RX 637 (ALT 250 FOR IP): Performed by: PHYSICIAN ASSISTANT

## 2023-07-28 PROCEDURE — 81025 URINE PREGNANCY TEST: CPT

## 2023-07-28 PROCEDURE — 85379 FIBRIN DEGRADATION QUANT: CPT

## 2023-07-28 PROCEDURE — 93005 ELECTROCARDIOGRAM TRACING: CPT | Performed by: PHYSICIAN ASSISTANT

## 2023-07-28 RX ORDER — IBUPROFEN 800 MG/1
800 TABLET ORAL DAILY PRN
COMMUNITY
Start: 2023-07-21 | End: 2023-08-03

## 2023-07-28 RX ORDER — 0.9 % SODIUM CHLORIDE 0.9 %
1000 INTRAVENOUS SOLUTION INTRAVENOUS ONCE
Status: COMPLETED | OUTPATIENT
Start: 2023-07-28 | End: 2023-07-28

## 2023-07-28 RX ORDER — ASPIRIN 325 MG
325 TABLET ORAL ONCE
Status: COMPLETED | OUTPATIENT
Start: 2023-07-28 | End: 2023-07-28

## 2023-07-28 RX ORDER — MORPHINE SULFATE 2 MG/ML
2 INJECTION, SOLUTION INTRAMUSCULAR; INTRAVENOUS ONCE
Status: COMPLETED | OUTPATIENT
Start: 2023-07-28 | End: 2023-07-28

## 2023-07-28 RX ADMIN — IOPAMIDOL 49 ML: 755 INJECTION, SOLUTION INTRAVENOUS at 15:35

## 2023-07-28 RX ADMIN — ASPIRIN 325 MG: 325 TABLET ORAL at 13:24

## 2023-07-28 RX ADMIN — APIXABAN 10 MG: 5 TABLET, FILM COATED ORAL at 17:28

## 2023-07-28 RX ADMIN — SODIUM CHLORIDE 1000 ML: 9 INJECTION, SOLUTION INTRAVENOUS at 13:25

## 2023-07-28 RX ADMIN — MORPHINE SULFATE 2 MG: 2 INJECTION, SOLUTION INTRAMUSCULAR; INTRAVENOUS at 13:26

## 2023-07-28 ASSESSMENT — PAIN DESCRIPTION - ORIENTATION: ORIENTATION: LEFT

## 2023-07-28 ASSESSMENT — PAIN - FUNCTIONAL ASSESSMENT: PAIN_FUNCTIONAL_ASSESSMENT: PREVENTS OR INTERFERES WITH MANY ACTIVE NOT PASSIVE ACTIVITIES

## 2023-07-28 ASSESSMENT — PAIN DESCRIPTION - DESCRIPTORS: DESCRIPTORS: NAGGING;PRESSURE

## 2023-07-28 ASSESSMENT — PAIN DESCRIPTION - LOCATION: LOCATION: ARM;LEG

## 2023-07-28 ASSESSMENT — PAIN SCALES - GENERAL: PAINLEVEL_OUTOF10: 4

## 2023-07-28 NOTE — ED NOTES
Medication History  [facility]    Patient Name: Lissette Jasso 1990     Medication history has been completed by: Isra Allison CPhT    Source(s) of information: patient and insurance claims     Primary Care Physician: BURTON Dumont CNP     Pharmacy: [facility]    Allergies as of 07/28/2023 - Fully Reviewed 07/27/2023   Allergen Reaction Noted    Amoxicillin Hives 11/22/2011    Pcn [penicillins] Hives 11/22/2011    Sulfa antibiotics Other (See Comments) 11/22/2011    Benadryl [diphenhydramine hcl] Nausea And Vomiting 11/22/2011    Prednisone Palpitations 11/03/2022        Prior to Admission medications    Medication Sig Start Date End Date Taking? Authorizing Provider   ibuprofen (ADVIL;MOTRIN) 800 MG tablet Take 1 tablet by mouth daily as needed 7/21/23   Historical Provider, MD   levothyroxine (SYNTHROID) 125 MCG tablet Take 1 tablet by mouth once daily 4/21/23   BURTON Dumont CNP   ferrous sulfate (IRON 325) 325 (65 Fe) MG tablet Take 1 tablet by mouth nightly 1/26/23   Peg Green MD   omeprazole (PRILOSEC) 20 MG delayed release capsule Take 1 capsule by mouth nightly   07/28/23 Takes OTC 1/16/23   BURTON Dumont CNP   Acetaminophen (TYLENOL PO) as needed    Historical Provider, MD   docusate sodium (LOVE STOOL SOFTENER) 100 MG capsule Take 1 capsule by mouth daily as needed 3/30/22   BURTON Dumont CNP     Medications added or changed (ex. new medication, dosage change, interval change, formulation change):  IBU (added)    Medications removed from list (include reason, ex. noncompliance, medication cost, therapy complete etc.):   Lidocaine patch not using    Comments:  Medication list reviewed with patient and insurance claims verified. Patient report she took her levothyroxine and IBU today. Claims for clindamycin patient reports currently not taking.     To my knowledge the above medication history is accurate as of 7/28/2023 3:50 PM.   Gris Arnold

## 2023-07-28 NOTE — ED PROVIDER NOTES
EMERGENCY DEPARTMENT ENCOUNTER        Pt Name: Lissette Jasso  MRN: [id number]  [address] 1990  Date of evaluation: 7/28/2023  Provider: ERIC Perez  PCP: BURTON Dumont - CNP    BERNARDA. I have evaluated this patient. Triage CHIEF COMPLAINT       Chief Complaint   Patient presents with    Back Pain     Started the 23rd  not getting better. Heating pad does not help. Nausea     X2 days. Denies any emesis. Has been     Arm Pain     States her left arm feels very heavy numb and tingly x 1 month and not getting better . Denies any injury     Chest Pain     Tightness that comes and goes. Does not take any aspirin or nitro          HISTORY OF PRESENT ILLNESS      Chief Complaint: Chest pain, left upper extremity pain/tightness, lower back pain    Lissette Jasso is a 28 y.o.  female who presents to the emergency department today with a chief complaint of chest discomfort, left upper arm \"tightness\", low back pain and just \"not feeling right\". Changed patient states that it started last night and into this morning. She almost describes as a \"jittery feeling\". She has a sensation in her chest that she fillets like she cannot get a deep breath, she has an achiness in the left upper extremity, no obvious swelling. No weakness in the arm. She is also complaining of just general back pain, bilateral lower back, no radiation. No alarming symptoms, no bowel or bladder incontinence no saddle anesthesias. Denies urinary symptoms. Was seen by hematology yesterday and ultimately discharged. Patient has a history of unprovoked blood clots, she has been off anticoagulation for over a year. She describes no history of heart issues. Denies any vaginal symptoms, change in her bowel habits. No history of recurrent urinary tract infections, struct of uropathy. Patient does have history of thyroid issues. No illicit drug use, no significant alcohol use.     Nursing Notes were all reviewed and tenderness to her back. She is neurologically intact in the lower extremities. Secondary to her presentation, medical history a broad work-up was initiated including screening EKG, chest x-ray, labs to evaluate her chronic anemia, make sure there is no significant cardiac component. Will obtain a venous Doppler ultrasound of the left upper extremity as well as a D-dimer. Will check a urinalysis, urine pregnancy. Patient will be given fluids, aspirin, pain medication. Patient's initial EKG demonstrating no signs of ischemic changes, dynamic changes-life-threatening arrhythmia. Chest x-ray acutely negative for acute cardiopulmonary process. Blood work reassuring including troponin testing, no signs of anemia, metabolic or electrolyte or infectious process developing. Patient's left upper extremity ultrasound is positive for underlying DVT to the left brachial vein, exclusive or near occlusive. Secondary to this we did obtain a CTA pulmonary study to rule out the likelihood of underlying PE. Patient CTA pulmonary study was acutely negative. I did touch base with hematology since patient was seen by Dr. Armijo First yesterday. His recommendation is to initiate Eliquis and then he will see as an outpatient-will likely get cardiology involved if things develop but feel at this point we can discharge. Patient is amenable to this plan. Will advise close monitor symptoms, strict return precautions. To follow-up with hematology, will give first dose of Eliquis here tonight and then placed on the starter pack starting tomorrow. Patient was discharged in stable condition with normal vital signs.     History from : Patient    Limitations to history : None    Patient was given the following medications:  Medications   sodium chloride 0.9 % bolus 1,000 mL (0 mLs IntraVENous Stopped 7/28/23 1638)   aspirin tablet 325 mg (325 mg Oral Given 7/28/23 1324)   morphine (PF) injection 2 mg (2 mg IntraVENous Given 7/28/23 1326)

## 2023-07-28 NOTE — ED NOTES
12 lead EKG per my interpretation:  Normal Sinus Rhythm T wave inversion in lead III  Axis is   Normal  QTc is  normal  There is specific T wave changes appreciated. There is no specific ST wave changes appreciated.     Prior EKG to compare with was available showing similar morphology including T wave inversion       Joni Mcgregor MD  07/28/23 7961

## 2023-08-03 ENCOUNTER — OFFICE VISIT (OUTPATIENT)
Dept: ONCOLOGY | Age: 33
End: 2023-08-03

## 2023-08-03 ENCOUNTER — HOSPITAL ENCOUNTER (OUTPATIENT)
Dept: INFUSION THERAPY | Age: 33
Discharge: HOME | End: 2023-08-03

## 2023-08-03 ENCOUNTER — PATIENT MESSAGE (OUTPATIENT)
Dept: ONCOLOGY | Age: 33
End: 2023-08-03

## 2023-08-03 VITALS
DIASTOLIC BLOOD PRESSURE: 88 MMHG | HEART RATE: 76 BPM | HEIGHT: 62 IN | BODY MASS INDEX: 44.27 KG/M2 | OXYGEN SATURATION: 99 % | SYSTOLIC BLOOD PRESSURE: 138 MMHG | TEMPERATURE: 97.7 F | WEIGHT: 240.6 LBS

## 2023-08-03 DIAGNOSIS — I26.94 MULTIPLE SUBSEGMENTAL PULMONARY EMBOLI WITHOUT ACUTE COR PULMONALE (HCC): Primary | ICD-10-CM

## 2023-08-03 DIAGNOSIS — D64.9 ANEMIA, UNSPECIFIED TYPE: ICD-10-CM

## 2023-08-03 PROCEDURE — 99213 OFFICE O/P EST LOW 20 MIN: CPT | Performed by: INTERNAL MEDICINE

## 2023-08-03 PROCEDURE — 99211 OFF/OP EST MAY X REQ PHY/QHP: CPT

## 2023-08-03 NOTE — PROGRESS NOTES
MA Rooming Questions  Patient: Karen Nolasco  MRN: [id number]    Date: 8/3/2023        1. Do you have any new issues?   no         2. Do you need any refills on medications?    no    3. Have you had any imaging done since your last visit? yes - CT scan, xrays    4. Have you been hospitalized or seen in the emergency room since your last visit here?   yes - [facility] ER    5. Did the patient have a depression screening completed today?  No    No data recorded     PHQ-9 Given to (if applicable):               PHQ-9 Score (if applicable):                     [] Positive     []  Negative              Does question #9 need addressed (if applicable)                     [] Yes    []  No               Armand Martin CMA

## 2023-08-03 NOTE — PROGRESS NOTES
Patient Name:  Carlito Espinal  Patient :  1990  Patient MRN:  [id number]     Primary Oncologist: Ryan Flores MD  Referring Provider: BURTON Mederos CNP     Date of Service: 8/3/2023      Chief Complaint:    Chief Complaint   Patient presents with    Follow-up     Patient Active Problem List:     Fibrocystic breast disease     Thyroid disease     Sinus arrhythmia     Vitamin D deficiency     Multiple subsegmental pulmonary emboli without acute cor pulmonale (HCC)    HPI:   Santy Orr. Yordan Johnston is a 27-year-old very pleasant female with medical history significant for hypothyroidism, fibrocystic breast disease and vitamin D deficiency, referred to me on 2022 for evaluation of unprovoked pulmonary embolism. She initially presented to [facility] ED on 3/27/22 with chest pain and SOB. CTA pulmonary done on 3/27/2022 showed slightly suboptimal study. Findings highly suspicious for left-sided pulmonary emboli. Mild dilatation of the main pulmonary trunk, which can be seen in pulmonary arterial hypertension. She was started with eliquis since then. Her tightness of chest has improved some and she still has some chest pain. She denies sedentary life style, prolong immobilization, use of oral birth control. One of her half sister has VTE. She doesn't know much about her father side family history. All the hypercoagulable work ups done on 22 were normal.    CTA chest on 23 showed no evidence of pulmonary embolism or acute pulmonary abnormality. Stable 4.5 mm pulmonary nodule right upper lobe also previously seen on 2022 and 2018. CTA pulmonary on 23 showed no evidence of PE or acute pulmonary abnormality. Left UE doppler on 23 showed occlusive to near occlusive DVT of the left brachial vein. On August 3, 2023, she presented to me for follow-up. She was initially referred to me for evaluation of unprovoked venous thromboembolism.     All the

## 2023-08-03 NOTE — TELEPHONE ENCOUNTER
Called patient @ [phone] after addressing concerns with physician. Advised to hold off on dental procedure at this time d/t recently starting blood thinner for DVT on 07/28/2023. Patient voices understanding and will cancel wisdom tooth extraction tomorrow 08/04/223. Patient to rescheduled in the next couple of months. No further needs addressed at this time.

## 2023-08-10 DIAGNOSIS — E07.9 THYROID DISEASE: ICD-10-CM

## 2023-08-10 RX ORDER — LEVOTHYROXINE SODIUM 0.12 MG/1
TABLET ORAL
Qty: 30 TABLET | Refills: 0 | Status: SHIPPED | OUTPATIENT
Start: 2023-08-10

## 2023-08-14 ENCOUNTER — CLINICAL DOCUMENTATION (OUTPATIENT)
Dept: ONCOLOGY | Age: 33
End: 2023-08-14

## 2023-08-14 ENCOUNTER — OFFICE VISIT (OUTPATIENT)
Dept: INTERNAL MEDICINE CLINIC | Age: 33
End: 2023-08-14

## 2023-08-14 VITALS
WEIGHT: 236 LBS | BODY MASS INDEX: 43.43 KG/M2 | SYSTOLIC BLOOD PRESSURE: 120 MMHG | DIASTOLIC BLOOD PRESSURE: 78 MMHG | HEIGHT: 62 IN | OXYGEN SATURATION: 100 % | HEART RATE: 70 BPM | RESPIRATION RATE: 20 BRPM

## 2023-08-14 DIAGNOSIS — Z00.00 ENCOUNTER FOR PREVENTIVE HEALTH EXAMINATION: Primary | ICD-10-CM

## 2023-08-14 DIAGNOSIS — E07.9 THYROID DISEASE: ICD-10-CM

## 2023-08-14 DIAGNOSIS — I82.622 ACUTE DEEP VEIN THROMBOSIS (DVT) OF BRACHIAL VEIN OF LEFT UPPER EXTREMITY (HCC): ICD-10-CM

## 2023-08-14 PROCEDURE — 99395 PREV VISIT EST AGE 18-39: CPT | Performed by: NURSE PRACTITIONER

## 2023-08-14 ASSESSMENT — ENCOUNTER SYMPTOMS
ABDOMINAL PAIN: 0
CHEST TIGHTNESS: 0
DIARRHEA: 0
SINUS PAIN: 0
NAUSEA: 0
SHORTNESS OF BREATH: 0
APNEA: 0
COLOR CHANGE: 0
VOMITING: 0
SINUS PRESSURE: 0
COUGH: 0

## 2023-08-14 NOTE — PROGRESS NOTES
This nurse called the patient to discuss her concerns about taking Aleve. This nurse left a VM for the patient, this RN's direct number left.

## 2023-08-20 ENCOUNTER — HOSPITAL ENCOUNTER (EMERGENCY)
Age: 33
Discharge: HOME | End: 2023-08-20
Attending: EMERGENCY MEDICINE

## 2023-08-20 VITALS
HEART RATE: 70 BPM | RESPIRATION RATE: 18 BRPM | OXYGEN SATURATION: 95 % | SYSTOLIC BLOOD PRESSURE: 134 MMHG | DIASTOLIC BLOOD PRESSURE: 86 MMHG | TEMPERATURE: 98 F

## 2023-08-20 DIAGNOSIS — M54.50 ACUTE EXACERBATION OF CHRONIC LOW BACK PAIN: Primary | ICD-10-CM

## 2023-08-20 DIAGNOSIS — G89.29 ACUTE EXACERBATION OF CHRONIC LOW BACK PAIN: Primary | ICD-10-CM

## 2023-08-20 DIAGNOSIS — M54.31 SCIATICA OF RIGHT SIDE: ICD-10-CM

## 2023-08-20 PROCEDURE — 6370000000 HC RX 637 (ALT 250 FOR IP): Performed by: EMERGENCY MEDICINE

## 2023-08-20 PROCEDURE — 96372 THER/PROPH/DIAG INJ SC/IM: CPT

## 2023-08-20 PROCEDURE — 99284 EMERGENCY DEPT VISIT MOD MDM: CPT

## 2023-08-20 PROCEDURE — 6360000002 HC RX W HCPCS: Performed by: EMERGENCY MEDICINE

## 2023-08-20 RX ORDER — NAPROXEN 250 MG/1
500 TABLET ORAL ONCE
Status: COMPLETED | OUTPATIENT
Start: 2023-08-20 | End: 2023-08-20

## 2023-08-20 RX ORDER — METHOCARBAMOL 500 MG/1
500 TABLET, FILM COATED ORAL 4 TIMES DAILY
Qty: 40 TABLET | Refills: 0 | Status: SHIPPED | OUTPATIENT
Start: 2023-08-20 | End: 2023-08-30

## 2023-08-20 RX ORDER — OXYCODONE HYDROCHLORIDE AND ACETAMINOPHEN 5; 325 MG/1; MG/1
1 TABLET ORAL EVERY 6 HOURS PRN
Qty: 12 TABLET | Refills: 0 | Status: SHIPPED | OUTPATIENT
Start: 2023-08-20 | End: 2023-08-23

## 2023-08-20 RX ORDER — OXYCODONE HYDROCHLORIDE AND ACETAMINOPHEN 5; 325 MG/1; MG/1
1 TABLET ORAL ONCE
Status: COMPLETED | OUTPATIENT
Start: 2023-08-20 | End: 2023-08-20

## 2023-08-20 RX ORDER — LIDOCAINE 50 MG/G
1 PATCH TOPICAL DAILY
Qty: 10 PATCH | Refills: 0 | Status: SHIPPED | OUTPATIENT
Start: 2023-08-20 | End: 2023-08-30

## 2023-08-20 RX ORDER — NAPROXEN 500 MG/1
500 TABLET ORAL 2 TIMES DAILY WITH MEALS
Qty: 14 TABLET | Refills: 0 | Status: SHIPPED | OUTPATIENT
Start: 2023-08-20 | End: 2023-08-27

## 2023-08-20 RX ORDER — DEXAMETHASONE SODIUM PHOSPHATE 10 MG/ML
8 INJECTION, SOLUTION INTRAMUSCULAR; INTRAVENOUS ONCE
Status: COMPLETED | OUTPATIENT
Start: 2023-08-20 | End: 2023-08-20

## 2023-08-20 RX ADMIN — OXYCODONE AND ACETAMINOPHEN 1 TABLET: 5; 325 TABLET ORAL at 15:16

## 2023-08-20 RX ADMIN — DEXAMETHASONE SODIUM PHOSPHATE 8 MG: 10 INJECTION, SOLUTION INTRAMUSCULAR; INTRAVENOUS at 15:16

## 2023-08-20 RX ADMIN — NAPROXEN 500 MG: 250 TABLET ORAL at 15:15

## 2023-08-20 ASSESSMENT — LIFESTYLE VARIABLES
HOW OFTEN DO YOU HAVE A DRINK CONTAINING ALCOHOL: NEVER
HOW MANY STANDARD DRINKS CONTAINING ALCOHOL DO YOU HAVE ON A TYPICAL DAY: PATIENT DOES NOT DRINK

## 2023-08-20 ASSESSMENT — PAIN SCALES - GENERAL: PAINLEVEL_OUTOF10: 8

## 2023-08-21 NOTE — ED PROVIDER NOTES
Triage Chief Complaint:   Back Pain (Severe back pain on the right lower back that radiates down the right leg. Started about a week ago, no injury or fall. )    [place]:  Karen Nolasco is a 28 y.o. female that presents with back pain. Patient was in baseline state of health until approximately 1 week ago when the above started. Patient reports back pain is right low back, radiating down right leg and currently 8 out of 10. There is no fall or inciting event or trauma. Patient does report that heating pads and stretching have helped but pain started again today. Patient does have a history of back pain and has actually had x-ray imaging of her low back recently which did demonstrate some arthropathy and spondylosis. No numbness tingling or weakness, saddle anesthesia, no bowel or bladder dysfunction, and no rash. No personal history of cancer. No trauma. No IV drug abuse. ROS:  General:  No fevers  ENT:  No sore throat, no nasal congestion  Cardiovascular:  No chest pain  Respiratory:  No shortness of breath  Gastrointestinal:  No pain, no nausea, no vomiting, no diarrhea  Musculoskeletal:  + back pain, + muscle pain  Skin:  No rash, no pruritis  Neurologic:  No headache, no extremity numbness, no extremity tingling, no extremity weakness  Genitourinary:  No dysuria, no hematuria  Endocrine:  No unexpected weight gain, no unexpected weight loss  Extremities:  no edema, no pain    Past Medical History:   Diagnosis Date    Chest pain     GERD (gastroesophageal reflux disease)     H/O 24 hour EKG monitoring 11/3/09    48 hr holter monitor. Significant for sinus tachycardia episode the fastest of which is at 171 beats per min lasting for 3 min and 17 sec. H/O cardiovascular stress test 1/8/2015    treadmill    H/O echocardiogram 9/7/2011    EF>55%. Mild concentric left ventricular hypertrophy. Mild MR & TR. Mild mitral annular calcification.      Heart abnormalities     Valve leaking see's Dr Johnson Me History of cardiac monitoring 11/26/14    Event Monitor: Sinus tachy-theresa episodes but not clinically significant. History of cardiac monitoring 12/01/2017    30 day event monitoring     History of echocardiogram 12/19/2017    Normal left ventricle structure and function. Ejection fraction is visually estimated at >60%. No significant valvular disease noted. No evidence of pericardial effusion. Essentially normal echo. History of EKG 9/6/2011    Sinus arrhythmia. Tightward axis. Diffuse nonspecific T wave abnormalities. History of exercise stress test 12/19/2017    treadmill    Palpitations     Palpitations     Postpartum depression     Sinus arrhythmia     Thyroid disease     Underactive thyroid     Past Surgical History:   Procedure Laterality Date    FRACTURE SURGERY  1992    Left elbow/metal plate in elbow    LAPAROSCOPY      lap for cyst on ovary and checked for endometreosis    [address]    left    TONSILLECTOMY       Family History   Problem Relation Age of Onset    Depression Mother     High Cholesterol Mother     Thyroid Disease Father     Asthma Sister     Depression Sister     High Blood Pressure Sister     Learning Disabilities Sister     Migraines Sister     Learning Disabilities Sister     Diabetes Maternal Grandmother     Early Death Maternal Grandmother     Hearing Loss Maternal Grandmother     Heart Disease Maternal Grandmother     High Blood Pressure Maternal Grandmother     Cancer Maternal Grandmother     High Cholesterol Maternal Grandmother     Hypertension Maternal Grandmother     Migraines Maternal Grandmother     Cancer Maternal Grandfather         Lung    Miscarriages / Stillbirths Paternal Grandmother     Cancer Maternal Aunt 27        Kidney cancer.     Kidney Disease Maternal Aunt      Social History     Socioeconomic History    Marital status:      Spouse name: Not on file    Number of children: Not on file    Years of education: Not on file    Highest

## 2023-08-29 DIAGNOSIS — M54.9 CHRONIC BACK PAIN, UNSPECIFIED BACK LOCATION, UNSPECIFIED BACK PAIN LATERALITY: Primary | ICD-10-CM

## 2023-08-29 DIAGNOSIS — G89.29 CHRONIC BACK PAIN, UNSPECIFIED BACK LOCATION, UNSPECIFIED BACK PAIN LATERALITY: Primary | ICD-10-CM

## 2023-09-12 DIAGNOSIS — E07.9 THYROID DISEASE: ICD-10-CM

## 2023-09-12 RX ORDER — LEVOTHYROXINE SODIUM 0.12 MG/1
TABLET ORAL
Qty: 30 TABLET | Refills: 5 | Status: SHIPPED | OUTPATIENT
Start: 2023-09-12

## 2023-09-25 ENCOUNTER — HOSPITAL ENCOUNTER (EMERGENCY)
Age: 33
Discharge: HOME | End: 2023-09-25

## 2023-09-25 VITALS
SYSTOLIC BLOOD PRESSURE: 124 MMHG | TEMPERATURE: 98.5 F | HEART RATE: 84 BPM | RESPIRATION RATE: 18 BRPM | BODY MASS INDEX: 43.9 KG/M2 | DIASTOLIC BLOOD PRESSURE: 71 MMHG | WEIGHT: 240 LBS | OXYGEN SATURATION: 99 %

## 2023-09-25 DIAGNOSIS — J06.9 ACUTE UPPER RESPIRATORY INFECTION: Primary | ICD-10-CM

## 2023-09-25 DIAGNOSIS — H65.92 MIDDLE EAR EFFUSION, LEFT: ICD-10-CM

## 2023-09-25 LAB
EKG ATRIAL RATE: 81 BPM
EKG DIAGNOSIS: NORMAL
EKG P AXIS: 24 DEGREES
EKG P-R INTERVAL: 138 MS
EKG Q-T INTERVAL: 366 MS
EKG QRS DURATION: 84 MS
EKG QTC CALCULATION (BAZETT): 425 MS
EKG R AXIS: 1 DEGREES
EKG T AXIS: 0 DEGREES
EKG VENTRICULAR RATE: 81 BPM
INFLUENZA A ANTIGEN: NOT DETECTED
INFLUENZA B ANTIGEN: NOT DETECTED
SARS-COV-2 RDRP RESP QL NAA+PROBE: NOT DETECTED
SOURCE: NORMAL

## 2023-09-25 PROCEDURE — 93010 ELECTROCARDIOGRAM REPORT: CPT | Performed by: INTERNAL MEDICINE

## 2023-09-25 PROCEDURE — 99283 EMERGENCY DEPT VISIT LOW MDM: CPT

## 2023-09-25 PROCEDURE — 93005 ELECTROCARDIOGRAM TRACING: CPT | Performed by: EMERGENCY MEDICINE

## 2023-09-25 PROCEDURE — 87502 INFLUENZA DNA AMP PROBE: CPT

## 2023-09-25 PROCEDURE — 87081 CULTURE SCREEN ONLY: CPT

## 2023-09-25 PROCEDURE — 6370000000 HC RX 637 (ALT 250 FOR IP)

## 2023-09-25 PROCEDURE — 87635 SARS-COV-2 COVID-19 AMP PRB: CPT

## 2023-09-25 PROCEDURE — 87430 STREP A AG IA: CPT

## 2023-09-25 RX ORDER — GUAIFENESIN/DEXTROMETHORPHAN 100-10MG/5
5 SYRUP ORAL 3 TIMES DAILY PRN
Qty: 120 ML | Refills: 0 | Status: SHIPPED | OUTPATIENT
Start: 2023-09-25 | End: 2023-09-30

## 2023-09-25 RX ORDER — GUAIFENESIN/DEXTROMETHORPHAN 100-10MG/5
5 SYRUP ORAL ONCE
Status: COMPLETED | OUTPATIENT
Start: 2023-09-25 | End: 2023-09-25

## 2023-09-25 RX ORDER — IBUPROFEN 600 MG/1
600 TABLET ORAL ONCE
Status: COMPLETED | OUTPATIENT
Start: 2023-09-25 | End: 2023-09-25

## 2023-09-25 RX ORDER — SOD CHLOR,BICARB/SQUEEZ BOTTLE
1 PACKET, WITH RINSE DEVICE NASAL 4 TIMES DAILY
Qty: 1 EACH | Refills: 1 | Status: SHIPPED | OUTPATIENT
Start: 2023-09-25 | End: 2023-10-05

## 2023-09-25 RX ADMIN — IBUPROFEN 600 MG: 600 TABLET, FILM COATED ORAL at 07:46

## 2023-09-25 RX ADMIN — GUAIFENESIN AND DEXTROMETHORPHAN 5 ML: 100; 10 SYRUP ORAL at 07:46

## 2023-09-25 NOTE — ED PROVIDER NOTES
ED attending EKG interpretation (otherwise did not participate in the care of this patient)    EKG Interpretation  Interpreted by me  Compared to 7/20/2023  Rhythm: normal sinus   Rate: normal 81  Axis: normal  Ectopy: none  Conduction: normal  ST Segments: no acute change  T Waves: no acute change  Clinical Impression: normal sinus rhythm, no acute change     Sarah Mercedes MD  09/25/23 6613

## 2023-09-25 NOTE — ED NOTES
Pt stated she woke up around 0300 and had SOB and was having a \"flutter feeling\" in her chest but no chest pain. Pt also stated her throat and behind her ears was hurting.       [facility]  09/25/23 4178

## 2023-09-25 NOTE — ED PROVIDER NOTES
[place] [facility] [id number] ENCOUNTER        Pt Name: Lisa Barrios  MRN: [id number]  [address] [facility] 1990  Date of evaluation: 9/25/2023  Provider: BURTON Neville CNP  PCP: BURTON Tellez CNP  Note Started: 7:40 AM EDT 9/25/23      BERNARDA. I have evaluated this patient. CHIEF COMPLAINT       Chief Complaint   Patient presents with    Pharyngitis    Headache     Behind ears into ears as well    Shortness of Breath     Reports fluttering in chest when walking        HISTORY OF PRESENT ILLNESS: 1 or more Elements     History From: Patient    Limitations to history : None    Social Determinants Significantly Affecting Health : None    Chief Complaint: sore throat, headache fatigue    Lisa Barrios is a 28 y.o. female who presents to ED with  reports sore throat headache general malaise for the last 3 weeks. Patient states she just does not seem to get better. Reports daughter was sick first and now son is sick with similar symptoms. Denies fever nausea vomiting diarrhea. Denies cough states it is mostly congestion in her head. Reports feeling fluttery in her chest when she tries to do activities. Denies hemoptysis denies cough denies pain with breathing denies feeling short of breath. reports history of blood clots states she is currently on Eliquis and is taking her dose as prescribed. Nursing Notes were all reviewed and agreed with or any disagreements were addressed in the HPI. REVIEW OF SYSTEMS :      Review of Systems    Positives and Pertinent negatives as per HPI.      SURGICAL HISTORY     Past Surgical History:   Procedure Laterality Date    FRACTURE SURGERY  1992    Left elbow/metal plate in elbow    LAPAROSCOPY      lap for cyst on ovary and checked for endometreosis    [address]    left    TONSILLECTOMY         CURRENTMEDICATIONS       Discharge Medication List as of 9/25/2023  8:18 AM

## 2023-09-25 NOTE — ED TRIAGE NOTES
Pt reports has had a cold for 3 weeks but yesterday feeling worse, sore throat pain behind ears. SOB while walking with fluttering. States all of her kids have similar symptoms.

## 2023-09-26 RX ORDER — DOXYCYCLINE HYCLATE 100 MG
100 TABLET ORAL 2 TIMES DAILY
Qty: 14 TABLET | Refills: 0 | Status: SHIPPED | OUTPATIENT
Start: 2023-09-26 | End: 2023-10-03

## 2023-09-27 LAB
CULTURE: NORMAL
Lab: NORMAL
SPECIMEN: NORMAL
STREP A DIRECT SCREEN: NEGATIVE

## 2023-10-31 ENCOUNTER — OFFICE VISIT (OUTPATIENT)
Dept: INTERNAL MEDICINE CLINIC | Age: 33
End: 2023-10-31

## 2023-10-31 VITALS
BODY MASS INDEX: 44.16 KG/M2 | WEIGHT: 240 LBS | OXYGEN SATURATION: 99 % | DIASTOLIC BLOOD PRESSURE: 78 MMHG | HEART RATE: 76 BPM | RESPIRATION RATE: 16 BRPM | HEIGHT: 62 IN | SYSTOLIC BLOOD PRESSURE: 118 MMHG

## 2023-10-31 DIAGNOSIS — R39.9 UTI SYMPTOMS: Primary | ICD-10-CM

## 2023-10-31 LAB
BILIRUBIN, POC: NORMAL
BLOOD URINE, POC: NORMAL
CLARITY, POC: NORMAL
COLOR, POC: CLEAR
GLUCOSE URINE, POC: NORMAL
KETONES, POC: NORMAL
LEUKOCYTE EST, POC: NORMAL
NITRITE, POC: NORMAL
PH, POC: 5.5
PROTEIN, POC: NORMAL
SPECIFIC GRAVITY, POC: 1.01
UROBILINOGEN, POC: 0.2

## 2023-10-31 PROCEDURE — 81002 URINALYSIS NONAUTO W/O SCOPE: CPT | Performed by: NURSE PRACTITIONER

## 2023-10-31 PROCEDURE — 99213 OFFICE O/P EST LOW 20 MIN: CPT | Performed by: NURSE PRACTITIONER

## 2023-10-31 RX ORDER — AMITRIPTYLINE HYDROCHLORIDE 10 MG/1
10 TABLET, FILM COATED ORAL NIGHTLY
Qty: 30 TABLET | Refills: 2 | Status: SHIPPED | OUTPATIENT
Start: 2023-10-31

## 2023-10-31 ASSESSMENT — ENCOUNTER SYMPTOMS
COUGH: 0
CHEST TIGHTNESS: 0
DIARRHEA: 0
ABDOMINAL PAIN: 0
SINUS PRESSURE: 0
APNEA: 0
NAUSEA: 0
SHORTNESS OF BREATH: 0
COLOR CHANGE: 0
VOMITING: 0
SINUS PAIN: 0

## 2023-11-01 LAB — BACTERIA UR CULT: NORMAL

## 2023-11-13 RX ORDER — OMEPRAZOLE 20 MG/1
CAPSULE, DELAYED RELEASE ORAL
Qty: 60 CAPSULE | Refills: 3 | Status: SHIPPED | OUTPATIENT
Start: 2023-11-13 | End: 2023-11-13 | Stop reason: SDUPTHER

## 2023-11-14 RX ORDER — OMEPRAZOLE 20 MG/1
20 CAPSULE, DELAYED RELEASE ORAL 2 TIMES DAILY
Qty: 60 CAPSULE | Refills: 3 | Status: SHIPPED | OUTPATIENT
Start: 2023-11-14

## 2023-11-14 RX ORDER — DOCUSATE SODIUM 100 MG/1
100 CAPSULE, LIQUID FILLED ORAL 2 TIMES DAILY
Qty: 30 CAPSULE | Refills: 3 | Status: SHIPPED | OUTPATIENT
Start: 2023-11-14

## 2023-12-27 RX ORDER — LISINOPRIL 5 MG/1
5 TABLET ORAL DAILY
Qty: 30 TABLET | Refills: 5 | Status: SHIPPED | OUTPATIENT
Start: 2023-12-27

## 2024-01-10 ENCOUNTER — OFFICE VISIT (OUTPATIENT)
Dept: CARDIOLOGY CLINIC | Age: 34
End: 2024-01-10
Payer: COMMERCIAL

## 2024-01-10 VITALS
SYSTOLIC BLOOD PRESSURE: 136 MMHG | WEIGHT: 246 LBS | HEIGHT: 62 IN | BODY MASS INDEX: 45.27 KG/M2 | HEART RATE: 57 BPM | DIASTOLIC BLOOD PRESSURE: 70 MMHG

## 2024-01-10 DIAGNOSIS — E66.01 MORBID OBESITY WITH BMI OF 40.0-44.9, ADULT (HCC): ICD-10-CM

## 2024-01-10 DIAGNOSIS — I26.94 MULTIPLE SUBSEGMENTAL PULMONARY EMBOLI WITHOUT ACUTE COR PULMONALE (HCC): ICD-10-CM

## 2024-01-10 DIAGNOSIS — R07.89 OTHER CHEST PAIN: Primary | ICD-10-CM

## 2024-01-10 DIAGNOSIS — G47.33 OSA (OBSTRUCTIVE SLEEP APNEA): ICD-10-CM

## 2024-01-10 DIAGNOSIS — I49.8 SINUS ARRHYTHMIA: ICD-10-CM

## 2024-01-10 PROCEDURE — 99204 OFFICE O/P NEW MOD 45 MIN: CPT | Performed by: INTERNAL MEDICINE

## 2024-01-10 PROCEDURE — 93000 ELECTROCARDIOGRAM COMPLETE: CPT | Performed by: INTERNAL MEDICINE

## 2024-01-10 PROCEDURE — G8417 CALC BMI ABV UP PARAM F/U: HCPCS | Performed by: INTERNAL MEDICINE

## 2024-01-10 PROCEDURE — G8427 DOCREV CUR MEDS BY ELIG CLIN: HCPCS | Performed by: INTERNAL MEDICINE

## 2024-01-10 PROCEDURE — G8484 FLU IMMUNIZE NO ADMIN: HCPCS | Performed by: INTERNAL MEDICINE

## 2024-01-10 PROCEDURE — 1036F TOBACCO NON-USER: CPT | Performed by: INTERNAL MEDICINE

## 2024-01-10 NOTE — PATIENT INSTRUCTIONS
CHEST PAIN: Patient's symptoms are somewhat atypical in nature also her cardiac risk factor profile is low.  Twelve-lead EKG does reveal poor progression.  I will subject her to stress test for risk stratification and further recommendations to be based on test results.    ?HYPERTENSION: Patient's blood pressure is only borderline elevated with large cuff.  Better assessment of blood pressure can be made once patient does stress test.  Patient noticed start antihypertensive medication as until after the stress test is completed.  I will also check an echocardiographic study to look for left ventricular hypertrophy possibility.  Patient to reduce salt intake and do diet and exercise for weight reduction.    MORBID OBESITY: We will start with diet and exercise to see how much patient can lose with conservative measures alone before considering referral to weight loss clinic.    I spent about 30 min. of time in review of the available data, chart Prep., interviewing patient, obtaining history, performing physical exam, going through decision making analysis for assessment & plans of management on this patient.

## 2024-01-10 NOTE — PROGRESS NOTES
maternal grandmother; Kidney Disease in her maternal aunt; Learning Disabilities in her sister and sister; Migraines in her maternal grandmother and sister; Miscarriages / Stillbirths in her paternal grandmother; Thyroid Disease in her father.    Review of Systems:   Cardiovascular: No chest pain, dyspnea on exertion, palpitations or loss of consciousness  Respiratory: No cough or wheezing    Musculoskeletal:  No gait disturbance, weakness, muscle cramps, aches & pains or joint complaints  Neurological: No TIA or stroke symptoms  Psychiatric: No anxiety or depression  Hematologic/Lymphatic: No bleeding problems, blood clots or swollen lymph nodes    Physical Examination:    /70 (Site: Left Upper Arm, Position: Sitting, Cuff Size: Large Adult)   Pulse 57   Ht 1.575 m (5' 2\")   Wt 111.6 kg (246 lb)   BMI 44.99 kg/m²    Wt Readings from Last 3 Encounters:   01/10/24 111.6 kg (246 lb)   10/31/23 108.9 kg (240 lb)   09/25/23 108.9 kg (240 lb)     Body mass index is 44.99 kg/m².    General Appearance: Morbid-obese/Well Nourished  1. Skin: It is warm & dry. No rashes noted.  2. Eyes: No conjunctival Pallor seen. No jaundice noted.  3. Neck: is supple there is no elevation of JVD. No thyromegaly  4. Respiratory:  Resp Assessment: No abnormal findings.  Resp Auscultation: Vesicular breath sounds without rales or wheezing.  5. Cardiovascular:  Auscultation: Normal S1 & S2, No prominent murmurs  Carotid Arteries: No bruits present  Abdominal Aorta: Non-palpable  Pedal Pulses: 2+ and equal   6. Abdomen:  No masses or tenderness  Liver/Spleen: No Abnormalities Noted, no organomegaly.  7. Musculoskeletal: No joint deformities. No muscle wasting  8. Extremities:   No Cyanosis or Clubbing. No significant edema   9. Rectal / genital:   Deferred  10. Neurological/Psychiatric:  Oriented to time, place, and person  Non-anxious    Lab Results   Component Value Date/Time    TROPONINT <0.010 07/28/2023 01:15 PM    TROPONINT

## 2024-01-29 ENCOUNTER — TELEPHONE (OUTPATIENT)
Dept: CARDIOLOGY CLINIC | Age: 34
End: 2024-01-29

## 2024-01-30 ENCOUNTER — HOSPITAL ENCOUNTER (OUTPATIENT)
Dept: INFUSION THERAPY | Age: 34
Discharge: HOME | End: 2024-01-30
Payer: COMMERCIAL

## 2024-01-30 ENCOUNTER — OFFICE VISIT (OUTPATIENT)
Dept: ONCOLOGY | Age: 34
End: 2024-01-30
Payer: COMMERCIAL

## 2024-01-30 VITALS
TEMPERATURE: 97.6 F | SYSTOLIC BLOOD PRESSURE: 139 MMHG | DIASTOLIC BLOOD PRESSURE: 67 MMHG | OXYGEN SATURATION: 100 % | BODY MASS INDEX: 45.45 KG/M2 | RESPIRATION RATE: 16 BRPM | WEIGHT: 247 LBS | HEART RATE: 74 BPM | HEIGHT: 62 IN

## 2024-01-30 DIAGNOSIS — I26.94 MULTIPLE SUBSEGMENTAL PULMONARY EMBOLI WITHOUT ACUTE COR PULMONALE (HCC): Primary | ICD-10-CM

## 2024-01-30 DIAGNOSIS — D64.9 ANEMIA, UNSPECIFIED TYPE: ICD-10-CM

## 2024-01-30 DIAGNOSIS — I26.94 MULTIPLE SUBSEGMENTAL PULMONARY EMBOLI WITHOUT ACUTE COR PULMONALE (HCC): ICD-10-CM

## 2024-01-30 LAB
BASOPHILS ABSOLUTE: 0 K/CU MM
BASOPHILS RELATIVE PERCENT: 0.6 % (ref 0–1)
DIFFERENTIAL TYPE: ABNORMAL
EOSINOPHILS ABSOLUTE: 0.1 K/CU MM
EOSINOPHILS RELATIVE PERCENT: 2.1 % (ref 0–3)
FERRITIN: 46 NG/ML (ref 15–150)
HCT VFR BLD CALC: 40.2 % (ref 37–47)
HEMOGLOBIN: 13.3 GM/DL (ref 12.5–16)
IRON: 69 UG/DL (ref 37–145)
LYMPHOCYTES ABSOLUTE: 1.4 K/CU MM
LYMPHOCYTES RELATIVE PERCENT: 21.7 % (ref 24–44)
MCH RBC QN AUTO: 30.9 PG (ref 27–31)
MCHC RBC AUTO-ENTMCNC: 33.1 % (ref 32–36)
MCV RBC AUTO: 93.3 FL (ref 78–100)
MONOCYTES ABSOLUTE: 0.6 K/CU MM
MONOCYTES RELATIVE PERCENT: 8.3 % (ref 0–4)
PCT TRANSFERRIN: 22 % (ref 10–44)
PDW BLD-RTO: 13 % (ref 11.7–14.9)
PLATELET # BLD: 302 K/CU MM (ref 140–440)
PMV BLD AUTO: 9.9 FL (ref 7.5–11.1)
RBC # BLD: 4.31 M/CU MM (ref 4.2–5.4)
RETICULOCYTE COUNT PCT: 1.8 % (ref 0.2–2.2)
SEGMENTED NEUTROPHILS ABSOLUTE COUNT: 4.5 K/CU MM
SEGMENTED NEUTROPHILS RELATIVE PERCENT: 67.3 % (ref 36–66)
TOTAL IRON BINDING CAPACITY: 320 UG/DL (ref 250–450)
UNSATURATED IRON BINDING CAPACITY: 251 UG/DL (ref 110–370)
WBC # BLD: 6.6 K/CU MM (ref 4–10.5)

## 2024-01-30 PROCEDURE — 82728 ASSAY OF FERRITIN: CPT

## 2024-01-30 PROCEDURE — 99213 OFFICE O/P EST LOW 20 MIN: CPT | Performed by: INTERNAL MEDICINE

## 2024-01-30 PROCEDURE — G8484 FLU IMMUNIZE NO ADMIN: HCPCS | Performed by: INTERNAL MEDICINE

## 2024-01-30 PROCEDURE — 1036F TOBACCO NON-USER: CPT | Performed by: INTERNAL MEDICINE

## 2024-01-30 PROCEDURE — G8427 DOCREV CUR MEDS BY ELIG CLIN: HCPCS | Performed by: INTERNAL MEDICINE

## 2024-01-30 PROCEDURE — 99211 OFF/OP EST MAY X REQ PHY/QHP: CPT

## 2024-01-30 PROCEDURE — 85025 COMPLETE CBC W/AUTO DIFF WBC: CPT

## 2024-01-30 PROCEDURE — 85045 AUTOMATED RETICULOCYTE COUNT: CPT

## 2024-01-30 PROCEDURE — 36415 COLL VENOUS BLD VENIPUNCTURE: CPT

## 2024-01-30 PROCEDURE — 83540 ASSAY OF IRON: CPT

## 2024-01-30 PROCEDURE — G8417 CALC BMI ABV UP PARAM F/U: HCPCS | Performed by: INTERNAL MEDICINE

## 2024-01-30 PROCEDURE — 83550 IRON BINDING TEST: CPT

## 2024-01-30 NOTE — PROGRESS NOTES
MA Rooming Questions  Patient: Cesilia Hayward  MRN: [id number]    Date: 1/30/2024        1. Do you have any new issues?   yes - blood pressure has been running up/down; pt states she had an echo + stress test @ the [facility] last wk.          2. Do you need any refills on medications?    no    3. Have you had any imaging done since your last visit?   yes - echo done at the [facility]    4. Have you been hospitalized or seen in the emergency room since your last visit here?   yes - ER 9/26 + 9/25 + 8/21    5. Did the patient have a depression screening completed today? No    No data recorded     PHQ-9 Given to (if applicable):               PHQ-9 Score (if applicable):                     [] Positive     []  Negative              Does question #9 need addressed (if applicable)                     [] Yes    []  No               Elisabeth Young MA      
Hb is back to normal on 7/20/23 and 1/30/24 labs. Her ferritin on 1/30/24 was 46 only. I asked her to continue with ferrous sulfate for now.      Small RUL lung nodule - stable since 2018. She is non smoker and low risk.     I answered all her questions and concerns for today. Recent imaging and labs were reviewed and discussed with the patient.

## 2024-01-31 ENCOUNTER — CLINICAL DOCUMENTATION (OUTPATIENT)
Dept: ONCOLOGY | Age: 34
End: 2024-01-31

## 2024-01-31 NOTE — PROGRESS NOTES
This nurse called the patient @ [phone] to review lab results. However there was no answer. This nurse left a VM for the patient advising that her iron level is within the low end of normal. Dr Armenta would like for her to continue her oral iron daily. This RN's direct number left should she have any further questions.

## 2024-02-05 RX ORDER — FLUTICASONE PROPIONATE 50 MCG
1 SPRAY, SUSPENSION (ML) NASAL 2 TIMES DAILY
Qty: 1 EACH | Refills: 0 | Status: SHIPPED | OUTPATIENT
Start: 2024-02-05

## 2024-02-05 RX ORDER — DOCUSATE SODIUM 100 MG/1
100 CAPSULE, LIQUID FILLED ORAL 2 TIMES DAILY
Qty: 30 CAPSULE | Refills: 3 | Status: SHIPPED | OUTPATIENT
Start: 2024-02-05

## 2024-02-14 ENCOUNTER — OFFICE VISIT (OUTPATIENT)
Dept: CARDIOLOGY CLINIC | Age: 34
End: 2024-02-14
Payer: COMMERCIAL

## 2024-02-14 VITALS
BODY MASS INDEX: 45.82 KG/M2 | DIASTOLIC BLOOD PRESSURE: 60 MMHG | HEIGHT: 62 IN | HEART RATE: 52 BPM | SYSTOLIC BLOOD PRESSURE: 130 MMHG | WEIGHT: 249 LBS

## 2024-02-14 DIAGNOSIS — G47.33 OSA (OBSTRUCTIVE SLEEP APNEA): ICD-10-CM

## 2024-02-14 DIAGNOSIS — E07.9 THYROID DISEASE: ICD-10-CM

## 2024-02-14 DIAGNOSIS — R07.89 OTHER CHEST PAIN: Primary | ICD-10-CM

## 2024-02-14 DIAGNOSIS — E66.01 MORBID OBESITY WITH BMI OF 40.0-44.9, ADULT (HCC): ICD-10-CM

## 2024-02-14 DIAGNOSIS — N60.19 FIBROCYSTIC BREAST DISEASE (FCBD), UNSPECIFIED LATERALITY: ICD-10-CM

## 2024-02-14 PROCEDURE — G8427 DOCREV CUR MEDS BY ELIG CLIN: HCPCS | Performed by: INTERNAL MEDICINE

## 2024-02-14 PROCEDURE — G8417 CALC BMI ABV UP PARAM F/U: HCPCS | Performed by: INTERNAL MEDICINE

## 2024-02-14 PROCEDURE — 1036F TOBACCO NON-USER: CPT | Performed by: INTERNAL MEDICINE

## 2024-02-14 PROCEDURE — 99213 OFFICE O/P EST LOW 20 MIN: CPT | Performed by: INTERNAL MEDICINE

## 2024-02-14 PROCEDURE — G8484 FLU IMMUNIZE NO ADMIN: HCPCS | Performed by: INTERNAL MEDICINE

## 2024-02-14 NOTE — PROGRESS NOTES
OFFICE PROGRESS NOTES      Cesilia is a 34 y.o. female who has    CHIEF COMPLAINT AS FOLLOWS:  CHEST PAIN: Patient C/O chest pain but symptoms appear to be atypical.   SOB: No C/O SOB at this time.              LEG EDEMA: No leg edema   PALPITATIONS: Denies any C/O Palpitations                                 DIZZINESS:  C/O positional inequilibrium.   SYNCOPE: None   OTHER/ ADDITIONAL COMPLAINTS:                                     HPI: Patient is here for F/U on her Chest pain & Morbid obesity problems.                 Current Outpatient Medications   Medication Sig Dispense Refill    docusate sodium (LOVE STOOL SOFTENER) 100 MG capsule Take 1 capsule by mouth 2 times daily 30 capsule 3    fluticasone (FLONASE) 50 MCG/ACT nasal spray 1 spray by Nasal route 2 times daily 1 each 0    apixaban (ELIQUIS) 2.5 MG TABS tablet Take 1 tablet by mouth 2 times daily 180 tablet 1    omeprazole (PRILOSEC) 20 MG delayed release capsule Take 1 capsule by mouth 2 times daily 60 capsule 3    Hypertonic Nasal Wash (SINUS RINSE BOTTLE KIT) PACK 1 Bottle by Nasal route 4 times daily for 10 days 1 each 1    levothyroxine (SYNTHROID) 125 MCG tablet Take 1 tablet by mouth once daily 30 tablet 5    apixaban starter pack (ELIQUIS) 5 MG TBPK tablet Take 1 tablet by mouth See Admin Instructions 74 tablet 0    ferrous sulfate (IRON 325) 325 (65 Fe) MG tablet Take 1 tablet by mouth daily (with breakfast) (Patient taking differently: Take 1 tablet by mouth nightly) 30 tablet 5    Acetaminophen (TYLENOL PO) as needed      lisinopril (PRINIVIL;ZESTRIL) 5 MG tablet Take 1 tablet by mouth daily (Patient not taking: Reported on 2/14/2024) 30 tablet 5     No current facility-administered medications for this visit.     Allergies: Amoxicillin, Pcn [penicillins], Sulfa antibiotics, Benadryl [diphenhydramine hcl], and Prednisone  Review of Systems:    Constitutional: Negative for diaphoresis and fatigue  Respiratory: Negative for shortness of

## 2024-02-14 NOTE — PATIENT INSTRUCTIONS
CHEST PAIN: Patient's symptoms are somewhat atypical in nature also her cardiac risk factor profile is low.  Twelve-lead EKG does reveal poor RW progression.     stress test for risk stratification:1/23/2024  This is a normal treadmill stress test.     Patient baseline EKG reveals normal sinus rhythm at 86 bpm with nonspecific ST-T abnormalities.     Patient exercised for a total of 8 minutes on Johan protocol achieving a total workload of 9 METS.  Exercise was terminated once a target heart rate was achieved.  Patient did not complain of any symptoms.  However blunted blood pressure response to exercise noted.  EKG tracing did not reveal any diagnostic ischemic changes and there were no arrhythmias noted.  Dsouza treadmill score is 8 suggesting low risk status.    ECHO 1/23/2024    Left Ventricle: Normal left ventricular systolic function with a visually estimated EF of 55 - 60%. Left ventricle size is normal. Normal wall thickness. Normal wall motion. Normal diastolic function.    Mitral Valve: Mild regurgitation.    Pericardium: No pericardial effusion.    Image quality is fair.      MORBID OBESITY: We will start with diet and exercise to see how much patient can lose with conservative measures alone before considering referral to weight loss clinic.    TESTS ORDERED:none this visit     PREVIOUSLY ORDERED TESTS REVIEWED & DISCUSSED WITH THE PATIENT:     I personally reviewed & interpreted, all previously ordered tests as copied above. Latest Labs are pulled in to the note with dates.   Labs, specially in Reference to Lipid profile, Cardiac testing in the form of Echo ( dated: ), stress tests ( dated: ) & other relevant cardiac testing reviewed with patient & recommendations made based on assessment of the results.    Discussed role of Cardiac risk factors & effects + treatment of co morbidities with patient & advised accordingly.     MEDICATIONS: List of medications patient is currently taking is reviewed in detail

## 2024-02-15 ENCOUNTER — OFFICE VISIT (OUTPATIENT)
Dept: INTERNAL MEDICINE CLINIC | Age: 34
End: 2024-02-15
Payer: COMMERCIAL

## 2024-02-15 VITALS
HEART RATE: 85 BPM | HEIGHT: 62 IN | WEIGHT: 247 LBS | RESPIRATION RATE: 18 BRPM | SYSTOLIC BLOOD PRESSURE: 112 MMHG | OXYGEN SATURATION: 99 % | DIASTOLIC BLOOD PRESSURE: 72 MMHG | BODY MASS INDEX: 45.45 KG/M2

## 2024-02-15 DIAGNOSIS — H92.03 OTALGIA OF BOTH EARS: ICD-10-CM

## 2024-02-15 DIAGNOSIS — I26.94 MULTIPLE SUBSEGMENTAL PULMONARY EMBOLI WITHOUT ACUTE COR PULMONALE (HCC): Primary | ICD-10-CM

## 2024-02-15 DIAGNOSIS — D50.9 IRON DEFICIENCY ANEMIA, UNSPECIFIED IRON DEFICIENCY ANEMIA TYPE: ICD-10-CM

## 2024-02-15 DIAGNOSIS — E07.9 THYROID DISEASE: ICD-10-CM

## 2024-02-15 PROCEDURE — G8427 DOCREV CUR MEDS BY ELIG CLIN: HCPCS | Performed by: NURSE PRACTITIONER

## 2024-02-15 PROCEDURE — G8417 CALC BMI ABV UP PARAM F/U: HCPCS | Performed by: NURSE PRACTITIONER

## 2024-02-15 PROCEDURE — 99214 OFFICE O/P EST MOD 30 MIN: CPT | Performed by: NURSE PRACTITIONER

## 2024-02-15 PROCEDURE — 1036F TOBACCO NON-USER: CPT | Performed by: NURSE PRACTITIONER

## 2024-02-15 PROCEDURE — G8484 FLU IMMUNIZE NO ADMIN: HCPCS | Performed by: NURSE PRACTITIONER

## 2024-02-15 NOTE — PROGRESS NOTES
Cesilia KAMARA Clos  1990  02/15/24    Chief Complaint   Patient presents with    6 Month Follow-Up       SUBJECTIVE:      6 month follow up:    States a few weeks ago she started to feel \"weird\" and having increase LUE pain. Had a d-dimer completed  through her hematologist which was normal, however, later went to the ER for ongoing back and was dx via US with acute DVT of the left brachial vein. Does have hx of PE after a previous COVID dx. Recently had follow with Dr BUSTOS 1/30/24 and continues on Eliquis. All hyper-coag workups were WNL. 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 Dose is 2.5 mg BID.     Patient continues to be compliant with synthroid regiment for her hypothyroidism.   Denies c/o fatigue, weight gain or loss, heat or cold intolerance, diarrhea, or other GI symptoms. Continues on 125 mcg daily.      Lab Results   Component Value Date    TSH 2.09 04/12/2018    TSHREFLEX 1.85 02/25/2022    TSHHS 4.040 07/28/2023     Continues on oral Fe 325 mg once daily.     Review of Systems   Constitutional:  Negative for activity change, appetite change, fatigue and fever.   HENT:  Negative for congestion, nosebleeds, sinus pressure and sinus pain.    Respiratory:  Negative for apnea, cough, chest tightness and shortness of breath.    Cardiovascular:  Negative for chest pain and palpitations.   Gastrointestinal:  Negative for abdominal pain, diarrhea, nausea and vomiting.   Genitourinary:  Negative for difficulty urinating, flank pain and hematuria.   Musculoskeletal:  Negative for arthralgias, joint swelling and myalgias.   Skin:  Negative for color change and rash.   Neurological:  Negative for dizziness, light-headedness and headaches.   Psychiatric/Behavioral: Negative.  Negative for behavioral problems.        OBJECTIVE:    /72   Pulse 85   Resp 18   Ht 1.575 m (5' 2.01\")   Wt 112 kg (247 lb)   SpO2 99%   BMI 45.17 kg/m²     Physical Exam  Constitutional:

## 2024-02-18 ENCOUNTER — HOSPITAL ENCOUNTER (EMERGENCY)
Age: 34
Discharge: HOME | End: 2024-02-18
Payer: COMMERCIAL

## 2024-02-18 VITALS
DIASTOLIC BLOOD PRESSURE: 69 MMHG | TEMPERATURE: 97.8 F | OXYGEN SATURATION: 99 % | HEIGHT: 62 IN | RESPIRATION RATE: 15 BRPM | SYSTOLIC BLOOD PRESSURE: 121 MMHG | BODY MASS INDEX: 45.45 KG/M2 | HEART RATE: 72 BPM | WEIGHT: 247 LBS

## 2024-02-18 DIAGNOSIS — M77.11 LATERAL EPICONDYLITIS OF RIGHT ELBOW: Primary | ICD-10-CM

## 2024-02-18 PROCEDURE — 99283 EMERGENCY DEPT VISIT LOW MDM: CPT

## 2024-02-18 RX ORDER — NAPROXEN 500 MG/1
500 TABLET ORAL 2 TIMES DAILY WITH MEALS
Qty: 28 TABLET | Refills: 0 | Status: SHIPPED | OUTPATIENT
Start: 2024-02-18 | End: 2024-03-03

## 2024-02-18 ASSESSMENT — PAIN SCALES - GENERAL: PAINLEVEL_OUTOF10: 4

## 2024-02-18 ASSESSMENT — PAIN - FUNCTIONAL ASSESSMENT: PAIN_FUNCTIONAL_ASSESSMENT: 0-10

## 2024-02-18 NOTE — DISCHARGE INSTR - COC
Continuity of Care Form    Patient Name: Cesilia Hayward   :  1990  MRN:  [id number]    Admit date:  2024  Discharge date:  ***    Code Status Order: Prior   Advance Directives:     Admitting Physician:  No admitting provider for patient encounter.  PCP: Jean Paul Sampson APRN - CNP    Discharging Nurse: ***  Discharging Hospital Unit/Room#: ED01/ED-01  Discharging Unit Phone Number: ***    Emergency Contact:   Extended Emergency Contact Information  Primary Emergency Contact: Elías Hayward  Address: [address] [place] [address]  Home Phone: [phone]  Mobile Phone: [phone]  Relation: Spouse  Secondary Emergency Contact: aRdha Rice  Home Phone: [phone]  Relation: Parent    Past Surgical History:  Past Surgical History:   Procedure Laterality Date    FRACTURE SURGERY      Left elbow/metal plate in elbow    LAPAROSCOPY      lap for cyst on ovary and checked for endometreosis    SHOULDER SURGERY      left    TONSILLECTOMY         Immunization History:   Immunization History   Administered Date(s) Administered    TDaP, ADACEL (age 10y-64y), BOOSTRIX (age 10y+), IM, 0.5mL 01/15/2012       Active Problems:  Patient Active Problem List   Diagnosis Code    Fibrocystic breast disease N60.19    Thyroid disease E07.9    Sinus arrhythmia I49.8    Other chest pain R07.89    Vitamin D deficiency E55.9    Multiple subsegmental pulmonary emboli without acute cor pulmonale (HCC) I26.94    DEISY (obstructive sleep apnea) G47.33    Hypersomnia G47.10    Morbid obesity with BMI of 40.0-44.9, adult (HCC) E66.01, Z68.41    Anemia D64.9       Isolation/Infection:   Isolation            No Isolation          Patient Infection Status       None to display                     Nurse Assessment:  Last Vital Signs: /69   Pulse 72   Temp 97.8 °F (36.6 °C) (Oral)   Resp 15   Ht 1.575 m (5' 2\")   Wt 112 kg (247 lb)   SpO2 99%   BMI 45.18 kg/m²     Last

## 2024-02-18 NOTE — ED PROVIDER NOTES
[facility] EMERGENCY DEPARTMENT  EMERGENCY DEPARTMENT ENCOUNTER        Pt Name: Cesilia Hayward  MRN: [id number]  Birthdate 1990  Date of evaluation: 2/18/2024  Provider: BURTON KERR CNP  PCP: Jean Paul Sampson APRN - CNP    BERNARDA. I have evaluated this patient.        Triage CHIEF COMPLAINT       Chief Complaint   Patient presents with    Arm Pain     States started having right elbow pain x a few weeks, not getting better. NKI         HISTORY OF PRESENT ILLNESS      Chief Complaint: Right elbow pain    Cesilia Hayward is a 34 y.o. female who presents for evaluation of right elbow pain for the last several weeks.  She states this started after she hyperextended her elbow.  She has noticed consistent pain over the outside aspect of her elbow which gets worse with certain types of movement or activities.  She is a stay-at-home mom.  Denies any repetitive activities.    Nursing Notes were all reviewed and agreed with or any disagreements were addressed in the HPI.    REVIEW OF SYSTEMS     Pertinent ROS as noted in HPI.      PAST MEDICAL HISTORY     Past Medical History:   Diagnosis Date    Chest pain     GERD (gastroesophageal reflux disease)     H/O 24 hour EKG monitoring 11/3/09    48 hr holter monitor. Significant for sinus tachycardia episode the fastest of which is at 171 beats per min lasting for 3 min and 17 sec.    H/O cardiovascular stress test 1/8/2015    treadmill    H/O echocardiogram 9/7/2011    EF>55%. Mild concentric left ventricular hypertrophy. Mild MR & TR. Mild mitral annular calcification.     Heart abnormalities     Valve leaking see's Dr Baig    History of cardiac monitoring 11/26/14    Event Monitor: Sinus tachy-theresa episodes but not clinically significant.     History of cardiac monitoring 12/01/2017    30 day event monitoring     History of echocardiogram 12/19/2017    Normal left ventricle structure and function. Ejection fraction

## 2024-03-08 DIAGNOSIS — M25.531 BILATERAL WRIST PAIN: Primary | ICD-10-CM

## 2024-03-08 DIAGNOSIS — M25.532 BILATERAL WRIST PAIN: Primary | ICD-10-CM

## 2024-03-13 DIAGNOSIS — E07.9 THYROID DISEASE: ICD-10-CM

## 2024-03-13 RX ORDER — LEVOTHYROXINE SODIUM 0.12 MG/1
TABLET ORAL
Qty: 30 TABLET | Refills: 5 | Status: SHIPPED | OUTPATIENT
Start: 2024-03-13

## 2024-04-12 RX ORDER — FLUTICASONE PROPIONATE 50 MCG
1 SPRAY, SUSPENSION (ML) NASAL 2 TIMES DAILY
Qty: 1 EACH | Refills: 2 | Status: SHIPPED | OUTPATIENT
Start: 2024-04-12

## 2024-06-14 ENCOUNTER — PROCEDURE VISIT (OUTPATIENT)
Dept: PHYSICAL MEDICINE AND REHAB | Age: 34
End: 2024-06-14

## 2024-06-14 DIAGNOSIS — R20.2 PARESTHESIA AND PAIN OF BOTH UPPER EXTREMITIES: ICD-10-CM

## 2024-06-14 DIAGNOSIS — M79.602 PARESTHESIA AND PAIN OF BOTH UPPER EXTREMITIES: ICD-10-CM

## 2024-06-14 DIAGNOSIS — M79.601 PARESTHESIA AND PAIN OF BOTH UPPER EXTREMITIES: ICD-10-CM

## 2024-06-14 DIAGNOSIS — G56.03 BILATERAL CARPAL TUNNEL SYNDROME: Primary | ICD-10-CM

## 2024-06-14 NOTE — PROGRESS NOTES
EMG REPORT     CHIEF COMPLAINT: Burning and stinging pain in the hands and fingers.    HISTORY OF PRESENT ILLNESS: 34 y.o. right hand dominant female with left more than right hand and digit pain with burning and tingling especially in her thumbs and long fingers.  The symptoms have been present for at least 7 years but have seemed worse recently.  Her hands go numb when she drives and hand burning and pain will wake her from sleep.  She drops things from her grasp but has not noted any limb discoloration or localized swelling.  She rated the pain severity as 7/10.  She has intermittent numbness and tingling in her feet.  She has no history of diabetes or any thyroid disorder.      PHYSICAL EXAMINATION: Alert.  Well-maintained cervical spine active rotation bilaterally.  Spurling's maneuver was negative.  Upper limb reflexes were trace only.  Tinel sign was negative.  Manual muscle testing was intact throughout.  Perception of touch was distorted over the thumb tips of each hand.  There was no atrophy, tremor or clonus noted.      NERVE CONDUCTION STUDIES:     MOTOR         LATENCY NORMAL AMPLITUDE DISTANCE COND. BRIANA.   RIGHT  MEDIAN 3.1 < 4.2 msec 8.9 8 cm 55   LEFT  MEDIAN 3.3 < 4.2 msec 7.7 8 cm >50   RIGHT  ULNAR 2.5 < 4.2 msec 9.8/8.3 8 cm 66/55   LEFT  ULNAR 3.0 < 4.2 msec 5.7 8 cm >50      SENSORY  ORTHODROMIC        LATENCY NORMAL AMPLITUDE DISTANCE   RIGHT MEDIAN 2.3 <2.3 msec 40 10 cm   LEFT  MEDIAN 2.5 < 2.3 msec 25 10 cm   RIGHT  ULNAR 1.9 < 2.3 msec 24 10 cm   LEFT  ULNAR 1.8 < 2.3 msec 19 10 cm       Right dorsal ulnar sensory: dL 2.1 msec, amp 14 microV.        NEEDLE EMG:      RIGHT   LEFT     Insertional Activity Spontaneous  Activity Volutional  MUAP's Insertional Activity Spontaneous  Activity Volutional  MUAP's   Cerv Parasp Normal None Normal Normal None Normal   Infraspinatus Normal None Normal Normal None Normal   Deltoid   Normal None Normal Normal None Normal   Biceps   Normal None Normal

## 2024-06-19 ENCOUNTER — HOSPITAL ENCOUNTER (OUTPATIENT)
Age: 34
Discharge: HOME | End: 2024-06-19
Payer: COMMERCIAL

## 2024-06-19 LAB
CRP SERPL HS-MCNC: 6.2 MG/L
SED RATE, AUTOMATED: 13 MM/HR (ref 0–20)
URATE SERPL-MCNC: 4.2 MG/DL (ref 2.6–6)

## 2024-06-19 PROCEDURE — 86038 ANTINUCLEAR ANTIBODIES: CPT

## 2024-06-19 PROCEDURE — 36415 COLL VENOUS BLD VENIPUNCTURE: CPT

## 2024-06-19 PROCEDURE — 85652 RBC SED RATE AUTOMATED: CPT

## 2024-06-19 PROCEDURE — 84550 ASSAY OF BLOOD/URIC ACID: CPT

## 2024-06-19 PROCEDURE — 86430 RHEUMATOID FACTOR TEST QUAL: CPT

## 2024-06-19 PROCEDURE — 86140 C-REACTIVE PROTEIN: CPT

## 2024-06-21 LAB
NUCLEAR IGG SER QL IA: NORMAL
RHEUMATOID FACTOR: <10 IU/ML (ref 0–14)

## 2024-07-16 RX ORDER — DOXYCYCLINE HYCLATE 100 MG
100 TABLET ORAL 2 TIMES DAILY
Qty: 14 TABLET | Refills: 0 | Status: SHIPPED | OUTPATIENT
Start: 2024-07-16 | End: 2024-07-23

## 2024-07-22 RX ORDER — APIXABAN 2.5 MG/1
2.5 TABLET, FILM COATED ORAL 2 TIMES DAILY
Qty: 180 TABLET | Refills: 0 | Status: SHIPPED | OUTPATIENT
Start: 2024-07-22

## 2024-07-30 ENCOUNTER — HOSPITAL ENCOUNTER (OUTPATIENT)
Dept: INFUSION THERAPY | Age: 34
Discharge: HOME | End: 2024-07-30
Payer: COMMERCIAL

## 2024-07-30 ENCOUNTER — OFFICE VISIT (OUTPATIENT)
Dept: ONCOLOGY | Age: 34
End: 2024-07-30
Payer: COMMERCIAL

## 2024-07-30 VITALS
RESPIRATION RATE: 18 BRPM | BODY MASS INDEX: 44.35 KG/M2 | HEART RATE: 65 BPM | HEIGHT: 62 IN | OXYGEN SATURATION: 98 % | DIASTOLIC BLOOD PRESSURE: 109 MMHG | WEIGHT: 241 LBS | TEMPERATURE: 98.1 F | SYSTOLIC BLOOD PRESSURE: 151 MMHG

## 2024-07-30 DIAGNOSIS — I26.94 MULTIPLE SUBSEGMENTAL PULMONARY EMBOLI WITHOUT ACUTE COR PULMONALE (HCC): Primary | ICD-10-CM

## 2024-07-30 DIAGNOSIS — D64.9 ANEMIA, UNSPECIFIED TYPE: ICD-10-CM

## 2024-07-30 DIAGNOSIS — I26.94 MULTIPLE SUBSEGMENTAL PULMONARY EMBOLI WITHOUT ACUTE COR PULMONALE (HCC): ICD-10-CM

## 2024-07-30 LAB
BASOPHILS ABSOLUTE: 0 K/CU MM
BASOPHILS RELATIVE PERCENT: 0.6 % (ref 0–1)
D-DIMER QUANTITATIVE: 0.32 UG/ML (FEU)
DIFFERENTIAL TYPE: ABNORMAL
EOSINOPHILS ABSOLUTE: 0.1 K/CU MM
EOSINOPHILS RELATIVE PERCENT: 1 % (ref 0–3)
FERRITIN: 79 NG/ML (ref 15–150)
HCT VFR BLD CALC: 40.6 % (ref 37–47)
HEMOGLOBIN: 13.4 GM/DL (ref 12.5–16)
IRON: 55 UG/DL (ref 37–145)
LACTATE DEHYDROGENASE: 225 IU/L (ref 120–246)
LYMPHOCYTES ABSOLUTE: 1.6 K/CU MM
LYMPHOCYTES RELATIVE PERCENT: 31 % (ref 24–44)
MCH RBC QN AUTO: 30.7 PG (ref 27–31)
MCHC RBC AUTO-ENTMCNC: 33 % (ref 32–36)
MCV RBC AUTO: 92.9 FL (ref 78–100)
MONOCYTES ABSOLUTE: 0.4 K/CU MM
MONOCYTES RELATIVE PERCENT: 8.1 % (ref 0–4)
NEUTROPHILS ABSOLUTE: 3 K/CU MM
NEUTROPHILS RELATIVE PERCENT: 59.3 % (ref 36–66)
PCT TRANSFERRIN: 19 % (ref 10–44)
PDW BLD-RTO: 12.9 % (ref 11.7–14.9)
PLATELET # BLD: 318 K/CU MM (ref 140–440)
PMV BLD AUTO: 9.3 FL (ref 7.5–11.1)
RBC # BLD: 4.37 M/CU MM (ref 4.2–5.4)
RETICULOCYTE COUNT PCT: 1.5 % (ref 0.2–2.2)
TOTAL IRON BINDING CAPACITY: 292 UG/DL (ref 250–450)
UNSATURATED IRON BINDING CAPACITY: 237 UG/DL (ref 110–370)
WBC # BLD: 5.1 K/CU MM (ref 4–10.5)

## 2024-07-30 PROCEDURE — 1036F TOBACCO NON-USER: CPT | Performed by: INTERNAL MEDICINE

## 2024-07-30 PROCEDURE — 36415 COLL VENOUS BLD VENIPUNCTURE: CPT

## 2024-07-30 PROCEDURE — 85025 COMPLETE CBC W/AUTO DIFF WBC: CPT

## 2024-07-30 PROCEDURE — 82728 ASSAY OF FERRITIN: CPT

## 2024-07-30 PROCEDURE — 83550 IRON BINDING TEST: CPT

## 2024-07-30 PROCEDURE — 85045 AUTOMATED RETICULOCYTE COUNT: CPT

## 2024-07-30 PROCEDURE — 99213 OFFICE O/P EST LOW 20 MIN: CPT | Performed by: INTERNAL MEDICINE

## 2024-07-30 PROCEDURE — 85379 FIBRIN DEGRADATION QUANT: CPT

## 2024-07-30 PROCEDURE — 99211 OFF/OP EST MAY X REQ PHY/QHP: CPT

## 2024-07-30 PROCEDURE — G8417 CALC BMI ABV UP PARAM F/U: HCPCS | Performed by: INTERNAL MEDICINE

## 2024-07-30 PROCEDURE — 83615 LACTATE (LD) (LDH) ENZYME: CPT

## 2024-07-30 PROCEDURE — G8427 DOCREV CUR MEDS BY ELIG CLIN: HCPCS | Performed by: INTERNAL MEDICINE

## 2024-07-30 PROCEDURE — 83540 ASSAY OF IRON: CPT

## 2024-07-30 NOTE — PROGRESS NOTES
MA Rooming Questions  Patient: Cesilia Hayward  MRN: [id number]    Date: 7/30/2024        1. Do you have any new issues?   no         2. Do you need any refills on medications?    no    3. Have you had any imaging done since your last visit?   no    4. Have you been hospitalized or seen in the emergency room since your last visit here?   no    5. Did the patient have a depression screening completed today? No    No data recorded     PHQ-9 Given to (if applicable):               PHQ-9 Score (if applicable):                     [] Positive     []  Negative              Does question #9 need addressed (if applicable)                     [] Yes    []  No               Delmis Forbes CMA

## 2024-07-30 NOTE — PROGRESS NOTES
Patient Name:  Cesilia Hayward  Patient :  1990  Patient MRN:  [id number]     Primary Oncologist: Juaquin Armenta MD  Referring Provider: Jean Paul Sampson APRN - CNP     Date of Service: 2024      Chief Complaint:    Chief Complaint   Patient presents with    Follow-up     Patient Active Problem List:     Fibrocystic breast disease     Thyroid disease     Sinus arrhythmia     Vitamin D deficiency     Multiple subsegmental pulmonary emboli without acute cor pulmonale (HCC)    HPI:   Cesilia Hayward is a 34-year-old very pleasant female with medical history significant for hypothyroidism, fibrocystic breast disease and vitamin D deficiency, initially referred to me on 2022 for evaluation of unprovoked pulmonary embolism.    She initially presented to [facility] ED on 3/27/22 with chest pain and SOB. CTA pulmonary done on 3/27/2022 showed slightly suboptimal study.  Findings highly suspicious for left-sided pulmonary emboli.  Mild dilatation of the main pulmonary trunk, which can be seen in pulmonary arterial hypertension.    She was started with eliquis since then. Her tightness of chest has improved some and she still has some chest pain.     She denies sedentary life style, prolong immobilization, use of oral birth control.     One of her half sister has VTE. She doesn't know much about her father side family history.     All the hypercoagulable work ups done on 22 were normal.    CTA chest on 23 showed no evidence of pulmonary embolism or acute pulmonary abnormality. Stable 4.5 mm pulmonary nodule right upper lobe also previously seen on 2022 and 2018.    CTA pulmonary on 23 showed no evidence of PE or acute pulmonary abnormality.     Left UE doppler on 23 showed occlusive to near occlusive DVT of the left brachial vein.     On 2024, she presented to me for follow-up.  She was initially referred to me for evaluation of unprovoked venous thromboembolism.    All the

## 2024-07-31 ENCOUNTER — CLINICAL DOCUMENTATION (OUTPATIENT)
Dept: ONCOLOGY | Age: 34
End: 2024-07-31

## 2024-07-31 DIAGNOSIS — R21 RASH: Primary | ICD-10-CM

## 2024-07-31 RX ORDER — TRIAMCINOLONE ACETONIDE 0.25 MG/G
CREAM TOPICAL
Qty: 1 EACH | Refills: 0 | Status: SHIPPED | OUTPATIENT
Start: 2024-07-31

## 2024-07-31 NOTE — PROGRESS NOTES
This nurse called the patient @ [phone]  to review lab results. Patient was notified of her d-dimer being normal , her iron status improving and that Dr Armenta advises for her to continue the oral iron. Patient verbalized understanding and denies further needs at this time.

## 2024-08-05 ENCOUNTER — OFFICE VISIT (OUTPATIENT)
Dept: INTERNAL MEDICINE CLINIC | Age: 34
End: 2024-08-05
Payer: COMMERCIAL

## 2024-08-05 VITALS
DIASTOLIC BLOOD PRESSURE: 84 MMHG | WEIGHT: 240.8 LBS | RESPIRATION RATE: 24 BRPM | SYSTOLIC BLOOD PRESSURE: 138 MMHG | OXYGEN SATURATION: 93 % | HEIGHT: 62 IN | HEART RATE: 59 BPM | BODY MASS INDEX: 44.31 KG/M2

## 2024-08-05 DIAGNOSIS — R21 RASH: Primary | ICD-10-CM

## 2024-08-05 DIAGNOSIS — R53.83 OTHER FATIGUE: ICD-10-CM

## 2024-08-05 DIAGNOSIS — I10 ESSENTIAL HYPERTENSION: ICD-10-CM

## 2024-08-05 LAB
25(OH)D3 SERPL-MCNC: 28.6 NG/ML
T4 FREE SERPL-MCNC: 1.5 NG/DL (ref 0.9–1.8)
TSH SERPL DL<=0.005 MIU/L-ACNC: 3.76 UIU/ML (ref 0.27–4.2)

## 2024-08-05 PROCEDURE — G8417 CALC BMI ABV UP PARAM F/U: HCPCS | Performed by: NURSE PRACTITIONER

## 2024-08-05 PROCEDURE — 99214 OFFICE O/P EST MOD 30 MIN: CPT | Performed by: NURSE PRACTITIONER

## 2024-08-05 PROCEDURE — G8427 DOCREV CUR MEDS BY ELIG CLIN: HCPCS | Performed by: NURSE PRACTITIONER

## 2024-08-05 PROCEDURE — 36415 COLL VENOUS BLD VENIPUNCTURE: CPT | Performed by: NURSE PRACTITIONER

## 2024-08-05 PROCEDURE — 3075F SYST BP GE 130 - 139MM HG: CPT | Performed by: NURSE PRACTITIONER

## 2024-08-05 PROCEDURE — 1036F TOBACCO NON-USER: CPT | Performed by: NURSE PRACTITIONER

## 2024-08-05 PROCEDURE — 3079F DIAST BP 80-89 MM HG: CPT | Performed by: NURSE PRACTITIONER

## 2024-08-05 RX ORDER — HYDROXYZINE HYDROCHLORIDE 25 MG/1
25 TABLET, FILM COATED ORAL EVERY 8 HOURS PRN
Qty: 30 TABLET | Refills: 0 | Status: SHIPPED | OUTPATIENT
Start: 2024-08-05 | End: 2024-08-15

## 2024-08-05 RX ORDER — FAMOTIDINE 40 MG/1
40 TABLET, FILM COATED ORAL DAILY
Qty: 30 TABLET | Refills: 0 | Status: SHIPPED | OUTPATIENT
Start: 2024-08-05

## 2024-08-05 ASSESSMENT — ENCOUNTER SYMPTOMS
SHORTNESS OF BREATH: 0
NAUSEA: 0
DIARRHEA: 0
APNEA: 0
VOMITING: 0
SINUS PAIN: 0
COLOR CHANGE: 0
COUGH: 0
SINUS PRESSURE: 0
CHEST TIGHTNESS: 0
ABDOMINAL PAIN: 0

## 2024-08-05 NOTE — PROGRESS NOTES
Cesilia KAMARA Clos  1990 08/05/24    Chief Complaint   Patient presents with    Rash     On both arms, hands, neck and face. Pt reports that rash appeared after starting doxy. Sx started Thursday. Pt reports that rash is itching and burning        SUBJECTIVE:      Cesilia states that approximately 3 weeks ago she was started on Doxycycline for dental pain. Shortly the next day noticed a pruritic rash on her RUE with a clear vesicular rash. Then developed a more erythematous rash to her BUE neck and face. Denies any oral/facial swelling, shortness of breath, wheezing, or other signs of anaphlyaxis. Did have some mild dizziness previously but this has since subsided.     Her BP has been averaging higher around 140-150 systolic. Denies any CP, SOB, leg swelling, palpitations. She has not started lisinopril.     Review of Systems   Constitutional:  Negative for activity change, appetite change, fatigue and fever.   HENT:  Negative for congestion, nosebleeds, sinus pressure and sinus pain.    Respiratory:  Negative for apnea, cough, chest tightness and shortness of breath.    Cardiovascular:  Negative for chest pain and palpitations.   Gastrointestinal:  Negative for abdominal pain, diarrhea, nausea and vomiting.   Genitourinary:  Negative for difficulty urinating, flank pain and hematuria.   Musculoskeletal:  Negative for arthralgias, joint swelling and myalgias.   Skin:  Positive for rash. Negative for color change.   Neurological:  Negative for dizziness, light-headedness and headaches.   Psychiatric/Behavioral: Negative.  Negative for behavioral problems.        OBJECTIVE:    /84   Pulse 59   Resp 24   Ht 1.575 m (5' 2.01\")   Wt 109.2 kg (240 lb 12.8 oz)   SpO2 93%   BMI 44.03 kg/m²     Physical Exam  Constitutional:       General: She is not in acute distress.     Appearance: She is well-developed. She is obese. She is not diaphoretic.   HENT:      Head: Normocephalic and atraumatic.      Nose: Nose

## 2024-08-14 ENCOUNTER — OFFICE VISIT (OUTPATIENT)
Dept: CARDIOLOGY CLINIC | Age: 34
End: 2024-08-14
Payer: COMMERCIAL

## 2024-08-14 VITALS
HEART RATE: 69 BPM | WEIGHT: 242.6 LBS | BODY MASS INDEX: 44.64 KG/M2 | HEIGHT: 62 IN | OXYGEN SATURATION: 99 % | DIASTOLIC BLOOD PRESSURE: 86 MMHG | SYSTOLIC BLOOD PRESSURE: 130 MMHG

## 2024-08-14 DIAGNOSIS — E66.01 MORBID OBESITY WITH BMI OF 40.0-44.9, ADULT (HCC): ICD-10-CM

## 2024-08-14 DIAGNOSIS — G47.33 OSA (OBSTRUCTIVE SLEEP APNEA): ICD-10-CM

## 2024-08-14 DIAGNOSIS — I26.94 MULTIPLE SUBSEGMENTAL PULMONARY EMBOLI WITHOUT ACUTE COR PULMONALE (HCC): ICD-10-CM

## 2024-08-14 DIAGNOSIS — I10 ESSENTIAL HYPERTENSION: Primary | ICD-10-CM

## 2024-08-14 PROCEDURE — G8417 CALC BMI ABV UP PARAM F/U: HCPCS | Performed by: INTERNAL MEDICINE

## 2024-08-14 PROCEDURE — 3079F DIAST BP 80-89 MM HG: CPT | Performed by: INTERNAL MEDICINE

## 2024-08-14 PROCEDURE — 1036F TOBACCO NON-USER: CPT | Performed by: INTERNAL MEDICINE

## 2024-08-14 PROCEDURE — G8427 DOCREV CUR MEDS BY ELIG CLIN: HCPCS | Performed by: INTERNAL MEDICINE

## 2024-08-14 PROCEDURE — 99213 OFFICE O/P EST LOW 20 MIN: CPT | Performed by: INTERNAL MEDICINE

## 2024-08-14 PROCEDURE — 3075F SYST BP GE 130 - 139MM HG: CPT | Performed by: INTERNAL MEDICINE

## 2024-08-14 NOTE — PROGRESS NOTES
BMP:    Lab Results   Component Value Date/Time     07/28/2023 01:15 PM     07/20/2023 11:03 AM    K 4.0 07/28/2023 01:15 PM    K 3.8 07/20/2023 11:03 AM     07/28/2023 01:15 PM     07/20/2023 11:03 AM    CO2 28 07/28/2023 01:15 PM    CO2 27 07/20/2023 11:03 AM    BUN 10 07/28/2023 01:15 PM    BUN 10 07/20/2023 11:03 AM    CREATININE 0.9 07/28/2023 01:15 PM    CREATININE 0.7 07/20/2023 11:03 AM     CMP:  Lab Results   Component Value Date/Time     07/28/2023 01:15 PM     07/20/2023 11:03 AM    K 4.0 07/28/2023 01:15 PM    K 3.8 07/20/2023 11:03 AM     07/28/2023 01:15 PM     07/20/2023 11:03 AM    CO2 28 07/28/2023 01:15 PM    CO2 27 07/20/2023 11:03 AM    BUN 10 07/28/2023 01:15 PM    BUN 10 07/20/2023 11:03 AM    CREATININE 0.9 07/28/2023 01:15 PM    CREATININE 0.7 07/20/2023 11:03 AM     TSH:    Lab Results   Component Value Date    TSH 3.76 08/05/2024    FT3 3.2 10/18/2010    T4FREE 1.5 08/05/2024       QUALITY MEASURES REVIEWED:  1.CAD:Patient is taking anti platelet agent:No  Patient does not have Hx of documented CAD  2.DYSLIPIDEMIA: Patient is on cholesterol lowering medication:No   3.Beta-Blocker therapy for CAD, if prior Myocardial Infarction:No  4.Counselled regarding smoking cessation. No   Patient does not Smoke.  5.Anticoagulation therapy (for PE) Yes   Does Not have A.Fib.   6.Discussed weight management strategies.    Assessment & Plan:  Primary / Secondary prevention is the goal by aggressive risk modification, healthy and therapeutic life style changes for cardiovascular risk reduction.       CHEST PAIN: Patient's symptoms are somewhat atypical in nature also her cardiac risk factor profile is low.  Twelve-lead EKG does reveal poor RW progression.      stress test for risk stratification:1/23/2024  This is a normal treadmill stress test.     Patient baseline EKG reveals normal sinus rhythm at 86 bpm with nonspecific ST-T abnormalities.     Patient

## 2024-08-14 NOTE — PATIENT INSTRUCTIONS
CHEST PAIN: Patient's symptoms are somewhat atypical in nature also her cardiac risk factor profile is low.  Twelve-lead EKG does reveal poor RW progression.      stress test for risk stratification:1/23/2024  This is a normal treadmill stress test.     Patient baseline EKG reveals normal sinus rhythm at 86 bpm with nonspecific ST-T abnormalities.     Patient exercised for a total of 8 minutes on Johan protocol achieving a total workload of 9 METS.  Exercise was terminated once a target heart rate was achieved.  Patient did not complain of any symptoms.  However blunted blood pressure response to exercise noted.  EKG tracing did not reveal any diagnostic ischemic changes and there were no arrhythmias noted.  Dsouza treadmill score is 8 suggesting low risk status.     ECHO 1/23/2024    Left Ventricle: Normal left ventricular systolic function with a visually estimated EF of 55 - 60%. Left ventricle size is normal. Normal wall thickness. Normal wall motion. Normal diastolic function.    Mitral Valve: Mild regurgitation.    Pericardium: No pericardial effusion.    Image quality is fair.      MORBID OBESITY: We will start with diet and exercise to see how much patient can lose with conservative measures alone before considering referral to weight loss clinic.     TESTS ORDERED:none this visit     PREVIOUSLY ORDERED TESTS REVIEWED & DISCUSSED WITH THE PATIENT:     I personally reviewed & interpreted, all previously ordered tests as copied above. Latest Labs are pulled in to the note with dates.   Labs, specially in Reference to Lipid profile, Cardiac testing in the form of Echo ( dated: ), stress tests ( dated: ) & other relevant cardiac testing reviewed with patient & recommendations made based on assessment of the results.    Discussed role of Cardiac risk factors & effects + treatment of co morbidities with patient & advised accordingly.     MEDICATIONS: List of medications patient is currently taking is reviewed in

## 2024-08-15 ENCOUNTER — OFFICE VISIT (OUTPATIENT)
Dept: INTERNAL MEDICINE CLINIC | Age: 34
End: 2024-08-15
Payer: COMMERCIAL

## 2024-08-15 VITALS
WEIGHT: 240 LBS | OXYGEN SATURATION: 99 % | DIASTOLIC BLOOD PRESSURE: 82 MMHG | HEIGHT: 62 IN | SYSTOLIC BLOOD PRESSURE: 124 MMHG | RESPIRATION RATE: 16 BRPM | HEART RATE: 66 BPM | BODY MASS INDEX: 44.16 KG/M2

## 2024-08-15 DIAGNOSIS — D50.9 IRON DEFICIENCY ANEMIA, UNSPECIFIED IRON DEFICIENCY ANEMIA TYPE: ICD-10-CM

## 2024-08-15 DIAGNOSIS — I26.94 MULTIPLE SUBSEGMENTAL PULMONARY EMBOLI WITHOUT ACUTE COR PULMONALE (HCC): ICD-10-CM

## 2024-08-15 DIAGNOSIS — M25.50 CHRONIC PAIN OF MULTIPLE JOINTS: ICD-10-CM

## 2024-08-15 DIAGNOSIS — E07.9 THYROID DISEASE: ICD-10-CM

## 2024-08-15 DIAGNOSIS — I10 ESSENTIAL HYPERTENSION: ICD-10-CM

## 2024-08-15 DIAGNOSIS — E55.9 VITAMIN D DEFICIENCY: ICD-10-CM

## 2024-08-15 DIAGNOSIS — G89.29 CHRONIC PAIN OF MULTIPLE JOINTS: ICD-10-CM

## 2024-08-15 DIAGNOSIS — Z00.00 ENCOUNTER FOR PREVENTIVE HEALTH EXAMINATION: Primary | ICD-10-CM

## 2024-08-15 PROCEDURE — 3079F DIAST BP 80-89 MM HG: CPT | Performed by: NURSE PRACTITIONER

## 2024-08-15 PROCEDURE — 99395 PREV VISIT EST AGE 18-39: CPT | Performed by: NURSE PRACTITIONER

## 2024-08-15 PROCEDURE — 3074F SYST BP LT 130 MM HG: CPT | Performed by: NURSE PRACTITIONER

## 2024-08-15 RX ORDER — DULOXETIN HYDROCHLORIDE 30 MG/1
30 CAPSULE, DELAYED RELEASE ORAL DAILY
Qty: 30 CAPSULE | Refills: 3 | Status: SHIPPED | OUTPATIENT
Start: 2024-08-15

## 2024-08-15 ASSESSMENT — ENCOUNTER SYMPTOMS
DIARRHEA: 0
APNEA: 0
NAUSEA: 0
COLOR CHANGE: 0
VOMITING: 0
SINUS PRESSURE: 0
ABDOMINAL PAIN: 0
COUGH: 0
SHORTNESS OF BREATH: 0
SINUS PAIN: 0
CHEST TIGHTNESS: 0

## 2024-08-15 NOTE — PROGRESS NOTES
8/15/2024    Cesilia Hayward (:  1990) is a 34 y.o. female, here for a preventive medicine evaluation.    Subjective   Patient Active Problem List   Diagnosis    Fibrocystic breast disease    Thyroid disease    Sinus arrhythmia    Other chest pain    Vitamin D deficiency    Multiple subsegmental pulmonary emboli without acute cor pulmonale (HCC)    DEISY (obstructive sleep apnea)    Hypersomnia    Morbid obesity with BMI of 40.0-44.9, adult (HCC)    Anemia    Essential hypertension     She has been having complaints of intermittent chest pain, back tightness, and chronic pain of multiple joints which has been persistent over the last 6 to 12 months.  She has had recent evaluation through cardiology and all cardiac workup has been benign.  Her blood pressure log when reviewed today shows an average of 130/80 as her mean blood pressure.  She does have a few that are better and occasional readings that are hypertensive however she has deferred lisinopril or any other antihypertensive medications and was encouraged by cardiology to increase exercise and work on weight loss.  She did have autoimmune arthritic workup completed earlier this year through orthopedics which was benign.``    Had follow with Dr BUSTOS 24 and continues on Eliquis. All hyper-coag workups were WNL. 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 Dose is 2.5 mg BID. Recent D-dimer levels were WNL.     Patient continues to be compliant with synthroid regiment for her hypothyroidism.   Denies c/o fatigue, weight gain or loss, heat or cold intolerance, diarrhea, or other GI symptoms. Continues on 125 mcg daily.   Lab Results   Component Value Date    TSH 3.76 2024    TSHHS 4.040 2023     Vit D Def- has not yet started replacement, but plans to pick this up OTC today.     Continues on oral Fe 325 mg once daily.     Review of Systems   Constitutional:  Negative for activity change, appetite change,

## 2024-08-15 NOTE — ASSESSMENT & PLAN NOTE
-Defers pharmacotherapy which I feel is reasonable given the majority of her blood pressure readings are within normal range.  Encouraged DASH diet, routine exercise and weight loss.  She will continue to monitor periodically at home and call with any issues.

## 2024-08-15 NOTE — ASSESSMENT & PLAN NOTE
Recheck level at follow-up.  Encouraged to get 2000 [employer] unit over-the-counter supplement and take once daily.

## 2024-08-23 ENCOUNTER — TELEPHONE (OUTPATIENT)
Dept: CARDIOLOGY CLINIC | Age: 34
End: 2024-08-23

## 2024-08-23 NOTE — TELEPHONE ENCOUNTER
Cardiologist: Dr. Baig  Surgeon: Dr. Villanueva  Surgery: Right Carpal Tunnel Release/tendon open debridement  Procedure should be done in the hospital setting    _____ yes    _____  no    Anesthesia: Local  Date: TBD  Fax# [phone]  # [phone]    Last OV 8/14/2024 w/Safia    CHEST PAIN: Patient's symptoms are somewhat atypical in nature also her cardiac risk factor profile is low.  Twelve-lead EKG does reveal poor RW progression.       Last EKG- 1/10/2024    Stress Test- 1/23/2024  This is a normal treadmill stress test.     Echo- 1/23/2024    Left Ventricle: Normal left ventricular systolic function with a visually estimated EF of 55 - 60%. Left ventricle size is normal. Normal wall thickness. Normal wall motion. Normal diastolic function.    Mitral Valve: Mild regurgitation.    Pericardium: No pericardial effusion.    Image quality is fair.      Eliquis

## 2024-09-13 DIAGNOSIS — E07.9 THYROID DISEASE: ICD-10-CM

## 2024-09-13 RX ORDER — LEVOTHYROXINE SODIUM 125 UG/1
TABLET ORAL
Qty: 30 TABLET | Refills: 0 | Status: SHIPPED | OUTPATIENT
Start: 2024-09-13

## 2024-10-11 ENCOUNTER — OFFICE VISIT (OUTPATIENT)
Dept: INTERNAL MEDICINE CLINIC | Age: 34
End: 2024-10-11

## 2024-10-11 VITALS
HEART RATE: 67 BPM | BODY MASS INDEX: 44.53 KG/M2 | WEIGHT: 242 LBS | OXYGEN SATURATION: 96 % | SYSTOLIC BLOOD PRESSURE: 110 MMHG | HEIGHT: 62 IN | RESPIRATION RATE: 18 BRPM | DIASTOLIC BLOOD PRESSURE: 76 MMHG

## 2024-10-11 DIAGNOSIS — R10.11 RUQ PAIN: Primary | ICD-10-CM

## 2024-10-11 RX ORDER — ACETAMINOPHEN 160 MG
2000 TABLET,DISINTEGRATING ORAL DAILY
COMMUNITY

## 2024-10-11 ASSESSMENT — ENCOUNTER SYMPTOMS
CHEST TIGHTNESS: 0
SINUS PRESSURE: 0
SHORTNESS OF BREATH: 0
VOMITING: 0
COLOR CHANGE: 0
NAUSEA: 0
ABDOMINAL PAIN: 1
SINUS PAIN: 0
COUGH: 0
APNEA: 0
DIARRHEA: 0

## 2024-10-11 NOTE — PROGRESS NOTES
Cesilia KAMARA Clos  1990  10/11/24    Chief Complaint   Patient presents with    Chest Pain     Pt described pain as \"sharp pain\", tried using heating pad to reduce pain and described feeling as \"tight\" when using heating pad. Sx onset 1 week ago. Sharp abdominal pain and bloating after eating as well.       SUBJECTIVE:      Cesilia presents to the office this morning for c/o recent issues with RUQ pain which has been ongoing intermittently over the last week. States she was laying flat when she stretched her arm above her head and felt some \"tightness and bubbles\" in the upper abdomen. She does however, although feel that the pain is present sometimes after eating as well. BM are normal. Pain occasionally up into her right shoulder. Did recently have right carpal tunnel release/tendon open debridement with ortho 9/18.     Review of Systems   Constitutional:  Negative for activity change, appetite change, fatigue and fever.   HENT:  Negative for congestion, nosebleeds, sinus pressure and sinus pain.    Respiratory:  Negative for apnea, cough, chest tightness and shortness of breath.    Cardiovascular:  Negative for chest pain and palpitations.   Gastrointestinal:  Positive for abdominal pain. Negative for diarrhea, nausea and vomiting.   Genitourinary:  Negative for difficulty urinating, flank pain and hematuria.   Musculoskeletal:  Negative for arthralgias, joint swelling and myalgias.   Skin:  Negative for color change and rash.   Neurological:  Negative for dizziness, light-headedness and headaches.   Psychiatric/Behavioral: Negative.  Negative for behavioral problems.        OBJECTIVE:    /76 (Site: Left Upper Arm, Position: Sitting)   Pulse 67   Resp 18   Ht 1.575 m (5' 2.01\")   Wt 109.8 kg (242 lb)   SpO2 96%   BMI 44.25 kg/m²     Physical Exam  Constitutional:       General: She is not in acute distress.     Appearance: She is well-developed. She is not diaphoretic.   HENT:      Head: Normocephalic

## 2024-10-12 DIAGNOSIS — E07.9 THYROID DISEASE: ICD-10-CM

## 2024-10-14 ENCOUNTER — HOSPITAL ENCOUNTER (OUTPATIENT)
Dept: ULTRASOUND IMAGING | Age: 34
Discharge: HOME | End: 2024-10-14
Payer: COMMERCIAL

## 2024-10-14 DIAGNOSIS — R10.11 RUQ PAIN: ICD-10-CM

## 2024-10-14 PROCEDURE — 76705 ECHO EXAM OF ABDOMEN: CPT

## 2024-10-14 RX ORDER — LEVOTHYROXINE SODIUM 125 UG/1
TABLET ORAL
Qty: 30 TABLET | Refills: 0 | Status: SHIPPED | OUTPATIENT
Start: 2024-10-14

## 2024-10-20 NOTE — PROGRESS NOTES
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 female who was referred by Jean Paul Sampson.  Symptom onset began 2 years ago after COVID infection that was complicated by PE x 2 episodes now on lifelong Eliquis.She gets random leg pains in her legs down to her toes, she has pain in her collar bones. There is wide spread morning stiffness. Her blood pressure has been fluctuating.  Took ibuprofen in the past.  After she started Eliquis.  Currently she takes Tylenol as needed for joint pain.    Today there is right low back pain   S/p right CTS surgery and elbow tendon repair on   Overall Discomfort: 2/10   Swelling: there is ankle swelling at the end of the day  AM stiffness: Denies  Improved with: Rest  Worse with: Movement     Hair loss: ++  Oral Ulcers: Denies  Dry eyes and mouth: Dry mouth   Rashes: Denies  Photosensitivity: Denies   Photophobia: Denies  Raynaud's: Denies  Chest Pain: Denies  SOB: Denies  Blood Clots: PE x 2 on eliquis following covid  Seizures/strokes: Denies  Kidney Disease:  denies  Anemia/thrombocytopenia/leukopenia: Denies    Denies smoking, etoh, recreational drug use. There is no family hx of lupus, RA, PsA or CTD.      Current rheum meds: none    Past rheum meds:           REVIEW OF SYSTEMS     Constitutional:  Denies fever or chills, decreased appetite, or weight loss   Eyes:  Denies change in visual acuity or eye dryness or irritation  HENT:  Denies dry mouth or oral ulcers  Respiratory:  Denies cough or shortness of breath   Cardiovascular:  Denies chest pain or edema   GI:  Denies abdominal pain, nausea, vomiting, bloody stools or diarrhea   :  Denies dysuria or

## 2024-10-21 RX ORDER — APIXABAN 2.5 MG/1
2.5 TABLET, FILM COATED ORAL 2 TIMES DAILY
Qty: 180 TABLET | Refills: 0 | Status: SHIPPED | OUTPATIENT
Start: 2024-10-21

## 2024-10-24 ASSESSMENT — RHEUMATOLOGY NEW PATIENT QUESTIONNAIRE
EASY BRUISING: Y
STOMACH PAIN: Y
DIFFICULTY BREATHING LYING DOWN: Y
MORNING STIFFNESS: Y
UNUSUAL FATIGUE: Y
NODULES/BUMPS: Y
DEPRESSION: Y
JOINT PAIN: Y
ANEMIA: Y
EYE PAIN: Y
NUMBNESS OR TINGLING IN HANDS OR FEET: Y
MUSCLE WEAKNESS: Y
SWOLLEN LEGS OR FEET: Y
PAIN OR BURNING ON URINATION: Y
HOW WOULD YOU DESCRIBE YOUR STIFFNESS ON AVERAGE: MILD
DOUBLE OR BLURRED VISION: Y
HOARSE VOICE: Y
HEARTBURN OR REFLUX: Y
INCREASED SUSCEPTIBILITY TO INFECTION: N
MORNING STIFFNESS IN LOWER BACK: Y
NIGHT SWEATS: Y
NAUSEA: Y
CHEST PAIN: Y
HEADACHES: Y
SKIN TIGHTNESS: Y
RASH: Y
MEMORY LOSS: Y

## 2024-10-25 ENCOUNTER — HOSPITAL ENCOUNTER (OUTPATIENT)
Age: 34
Discharge: HOME | End: 2024-10-25
Payer: COMMERCIAL

## 2024-10-25 ENCOUNTER — OFFICE VISIT (OUTPATIENT)
Dept: RHEUMATOLOGY | Age: 34
End: 2024-10-25
Payer: COMMERCIAL

## 2024-10-25 VITALS
SYSTOLIC BLOOD PRESSURE: 130 MMHG | OXYGEN SATURATION: 98 % | BODY MASS INDEX: 44.4 KG/M2 | WEIGHT: 242.8 LBS | HEART RATE: 68 BPM | DIASTOLIC BLOOD PRESSURE: 82 MMHG

## 2024-10-25 DIAGNOSIS — R53.82 CHRONIC FATIGUE: ICD-10-CM

## 2024-10-25 DIAGNOSIS — M25.50 POLYARTHRALGIA: ICD-10-CM

## 2024-10-25 DIAGNOSIS — R21 RASH: ICD-10-CM

## 2024-10-25 DIAGNOSIS — G47.10 HYPERSOMNIA: ICD-10-CM

## 2024-10-25 DIAGNOSIS — Z01.89 ENCOUNTER FOR OTHER SPECIFIED SPECIAL EXAMINATIONS: ICD-10-CM

## 2024-10-25 DIAGNOSIS — M25.50 POLYARTHRALGIA: Primary | ICD-10-CM

## 2024-10-25 LAB
25(OH)D3 SERPL-MCNC: 25.9 NG/ML (ref 30–150)
ALBUMIN SERPL-MCNC: 4.2 G/DL (ref 3.4–5)
ALBUMIN/GLOB SERPL: 1.7 {RATIO} (ref 1.1–2.2)
ALBUMIN: 4.2 G/DL (ref 3.4–5)
ALP SERPL-CCNC: 88 U/L (ref 40–129)
ALT SERPL-CCNC: 24 U/L (ref 10–40)
ANION GAP SERPL CALCULATED.3IONS-SCNC: 11 MMOL/L (ref 9–17)
AST SERPL-CCNC: 18 U/L (ref 15–37)
BILIRUB DIRECT SERPL-MCNC: <0.2 MG/DL (ref 0–0.3)
BILIRUB INDIRECT SERPL-MCNC: NORMAL MG/DL (ref 0–0.7)
BILIRUB SERPL-MCNC: 0.4 MG/DL (ref 0–1)
BUN SERPL-MCNC: 9 MG/DL (ref 7–20)
C3 SERPL-MCNC: 157 MG/DL (ref 90–180)
C4 SERPL-MCNC: 39 MG/DL (ref 10–40)
CALCIUM SERPL-MCNC: 9.2 MG/DL (ref 8.3–10.6)
CHLORIDE SERPL-SCNC: 102 MMOL/L (ref 99–110)
CO2 SERPL-SCNC: 26 MMOL/L (ref 21–32)
CREAT SERPL-MCNC: 0.7 MG/DL (ref 0.6–1.1)
CRP SERPL HS-MCNC: 13.2 MG/L (ref 0–5)
ERYTHROCYTE [DISTWIDTH] IN BLOOD BY AUTOMATED COUNT: 13.2 % (ref 11.7–14.9)
ERYTHROCYTE [SEDIMENTATION RATE] IN BLOOD BY WESTERGREN METHOD: 13 MM/HR (ref 0–20)
GFR, ESTIMATED: >90 ML/MIN/1.73M2
GLUCOSE SERPL-MCNC: 91 MG/DL (ref 74–99)
HAV IGM SERPL QL IA: NONREACTIVE
HBV CORE IGM SERPL QL IA: NONREACTIVE
HBV SURFACE AG SERPL QL IA: NONREACTIVE
HCT VFR BLD AUTO: 40.9 % (ref 37–47)
HCV AB SERPL QL IA: NONREACTIVE
HGB BLD-MCNC: 12.9 G/DL (ref 12.5–16)
MCH RBC QN AUTO: 30.4 PG (ref 27–31)
MCHC RBC AUTO-ENTMCNC: 31.5 G/DL (ref 32–36)
MCV RBC AUTO: 96.5 FL (ref 78–100)
PHOSPHATE SERPL-MCNC: 3.1 MG/DL (ref 2.5–4.9)
PLATELET # BLD AUTO: 313 K/UL (ref 140–440)
PMV BLD AUTO: 9.8 FL (ref 7.5–11.1)
POTASSIUM SERPL-SCNC: 4.1 MMOL/L (ref 3.5–5.1)
PROT SERPL-MCNC: 6.8 G/DL (ref 6.4–8.2)
RBC # BLD AUTO: 4.24 M/UL (ref 4.2–5.4)
SODIUM SERPL-SCNC: 139 MMOL/L (ref 136–145)
WBC OTHER # BLD: 5.8 K/UL (ref 4–10.5)

## 2024-10-25 PROCEDURE — 85652 RBC SED RATE AUTOMATED: CPT

## 2024-10-25 PROCEDURE — 85027 COMPLETE CBC AUTOMATED: CPT

## 2024-10-25 PROCEDURE — G8427 DOCREV CUR MEDS BY ELIG CLIN: HCPCS | Performed by: STUDENT IN AN ORGANIZED HEALTH CARE EDUCATION/TRAINING PROGRAM

## 2024-10-25 PROCEDURE — 80069 RENAL FUNCTION PANEL: CPT

## 2024-10-25 PROCEDURE — G8417 CALC BMI ABV UP PARAM F/U: HCPCS | Performed by: STUDENT IN AN ORGANIZED HEALTH CARE EDUCATION/TRAINING PROGRAM

## 2024-10-25 PROCEDURE — 3075F SYST BP GE 130 - 139MM HG: CPT | Performed by: STUDENT IN AN ORGANIZED HEALTH CARE EDUCATION/TRAINING PROGRAM

## 2024-10-25 PROCEDURE — 1036F TOBACCO NON-USER: CPT | Performed by: STUDENT IN AN ORGANIZED HEALTH CARE EDUCATION/TRAINING PROGRAM

## 2024-10-25 PROCEDURE — 86200 CCP ANTIBODY: CPT

## 2024-10-25 PROCEDURE — 80076 HEPATIC FUNCTION PANEL: CPT

## 2024-10-25 PROCEDURE — 86160 COMPLEMENT ANTIGEN: CPT

## 2024-10-25 PROCEDURE — 86140 C-REACTIVE PROTEIN: CPT

## 2024-10-25 PROCEDURE — G8484 FLU IMMUNIZE NO ADMIN: HCPCS | Performed by: STUDENT IN AN ORGANIZED HEALTH CARE EDUCATION/TRAINING PROGRAM

## 2024-10-25 PROCEDURE — 82306 VITAMIN D 25 HYDROXY: CPT

## 2024-10-25 PROCEDURE — 36415 COLL VENOUS BLD VENIPUNCTURE: CPT

## 2024-10-25 PROCEDURE — 86480 TB TEST CELL IMMUN MEASURE: CPT

## 2024-10-25 PROCEDURE — 80074 ACUTE HEPATITIS PANEL: CPT

## 2024-10-25 PROCEDURE — 86431 RHEUMATOID FACTOR QUANT: CPT

## 2024-10-25 PROCEDURE — 3079F DIAST BP 80-89 MM HG: CPT | Performed by: STUDENT IN AN ORGANIZED HEALTH CARE EDUCATION/TRAINING PROGRAM

## 2024-10-25 PROCEDURE — 99205 OFFICE O/P NEW HI 60 MIN: CPT | Performed by: STUDENT IN AN ORGANIZED HEALTH CARE EDUCATION/TRAINING PROGRAM

## 2024-10-25 NOTE — PATIENT INSTRUCTIONS
Patient Instructions  Complete ordered labs  We will discuss results at next visit  RTC in 3 weeks

## 2024-10-26 LAB — RHEUMATOID FACTOR: <10 IU/ML

## 2024-10-28 LAB
QUANTI TB GOLD PLUS: NEGATIVE
QUANTI TB1 MINUS NIL: 0.01 IU/ML
QUANTI TB2 MINUS NIL: 0 IU/ML
QUANTIFERON MITOGEN: 9.96 IU/ML
QUANTIFERON NIL: 0.04 IU/ML

## 2024-10-29 LAB — CYCLIC CITRULLIN PEPTIDE AB: 7 UNITS (ref 0–19)

## 2024-11-10 DIAGNOSIS — E07.9 THYROID DISEASE: ICD-10-CM

## 2024-11-10 NOTE — PROGRESS NOTES
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 female who established on 10/25/24.  Symptom onset began 2 years ago after COVID infection that was complicated by PE x 2 episodes now on lifelong Eliquis.She gets random leg pains in her legs down to her toes, she has pain in her collar bones. There is wide spread morning stiffness. Her blood pressure has been fluctuating.  Took ibuprofen in the past.  After she started Eliquis.  Currently she takes Tylenol as needed for joint pain.    LCV 10/25/24  WBC normal   Hgb 12.9 nl  Plt normal   CRP 13.2 H   Vit D 25.9 H   CCP normal  Quant neg   RF neg   LFT normal   Creatinine normal  C3 and C4 normal  ESR normal   MICHAEL neg   Other Sub serologies neg   Anti thyroglobulin IgG equivocal   Anti thyroid peroxidase IgG equivocal       Subjective:   Today there is entire bilateral leg pain that started suddenly   Her legs feel tight   She is scheduled for repeat LE doppler scan though she has been on eliquis   There is chronic low back pain that is chronic  XR lumbar spine showed anterior spurring @ L2-L3  S/p right CTS surgery and elbow tendon repair on   Overall Discomfort: 5/10 worse in bilateral legs  Swelling: there is ankle swelling at the end of the day  AM stiffness: Denies  Improved with: Rest  Worse with: Movement  Hair loss: ++  Blood Clots: PE x 2 on eliquis following covid    Denies smoking, etoh, recreational drug use. There is no family hx of lupus, RA, PsA or CTD.      Current rheum meds: none    Past rheum meds:           REVIEW OF SYSTEMS     Constitutional:  Denies fever or chills, decreased appetite, or weight loss   Eyes:  Denies change in visual

## 2024-11-11 RX ORDER — APIXABAN 2.5 MG/1
2.5 TABLET, FILM COATED ORAL 2 TIMES DAILY
Qty: 180 TABLET | Refills: 0 | Status: SHIPPED | OUTPATIENT
Start: 2024-11-11

## 2024-11-11 RX ORDER — LEVOTHYROXINE SODIUM 125 UG/1
TABLET ORAL
Qty: 30 TABLET | Refills: 0 | Status: SHIPPED | OUTPATIENT
Start: 2024-11-11

## 2024-11-12 ENCOUNTER — PATIENT MESSAGE (OUTPATIENT)
Dept: ONCOLOGY | Age: 34
End: 2024-11-12

## 2024-11-15 ENCOUNTER — HOSPITAL ENCOUNTER (OUTPATIENT)
Age: 34
Discharge: HOME | End: 2024-11-15
Payer: COMMERCIAL

## 2024-11-15 ENCOUNTER — OFFICE VISIT (OUTPATIENT)
Dept: RHEUMATOLOGY | Age: 34
End: 2024-11-15
Payer: COMMERCIAL

## 2024-11-15 VITALS
HEIGHT: 62 IN | HEART RATE: 66 BPM | DIASTOLIC BLOOD PRESSURE: 74 MMHG | RESPIRATION RATE: 16 BRPM | OXYGEN SATURATION: 100 % | BODY MASS INDEX: 44.83 KG/M2 | SYSTOLIC BLOOD PRESSURE: 126 MMHG | WEIGHT: 243.6 LBS

## 2024-11-15 DIAGNOSIS — M54.42 MIDLINE LOW BACK PAIN WITH LEFT-SIDED SCIATICA, UNSPECIFIED CHRONICITY: ICD-10-CM

## 2024-11-15 DIAGNOSIS — M25.50 POLYARTHRALGIA: ICD-10-CM

## 2024-11-15 DIAGNOSIS — R21 RASH: ICD-10-CM

## 2024-11-15 DIAGNOSIS — R79.82 ELEVATED C-REACTIVE PROTEIN (CRP): ICD-10-CM

## 2024-11-15 DIAGNOSIS — M25.50 POLYARTHRALGIA: Primary | ICD-10-CM

## 2024-11-15 LAB — CRP SERPL HS-MCNC: 8.2 MG/L (ref 0–5)

## 2024-11-15 PROCEDURE — 1036F TOBACCO NON-USER: CPT | Performed by: STUDENT IN AN ORGANIZED HEALTH CARE EDUCATION/TRAINING PROGRAM

## 2024-11-15 PROCEDURE — G8484 FLU IMMUNIZE NO ADMIN: HCPCS | Performed by: STUDENT IN AN ORGANIZED HEALTH CARE EDUCATION/TRAINING PROGRAM

## 2024-11-15 PROCEDURE — 99215 OFFICE O/P EST HI 40 MIN: CPT | Performed by: STUDENT IN AN ORGANIZED HEALTH CARE EDUCATION/TRAINING PROGRAM

## 2024-11-15 PROCEDURE — 86140 C-REACTIVE PROTEIN: CPT

## 2024-11-15 PROCEDURE — G8417 CALC BMI ABV UP PARAM F/U: HCPCS | Performed by: STUDENT IN AN ORGANIZED HEALTH CARE EDUCATION/TRAINING PROGRAM

## 2024-11-15 PROCEDURE — 36415 COLL VENOUS BLD VENIPUNCTURE: CPT

## 2024-11-15 PROCEDURE — 3078F DIAST BP <80 MM HG: CPT | Performed by: STUDENT IN AN ORGANIZED HEALTH CARE EDUCATION/TRAINING PROGRAM

## 2024-11-15 PROCEDURE — G8427 DOCREV CUR MEDS BY ELIG CLIN: HCPCS | Performed by: STUDENT IN AN ORGANIZED HEALTH CARE EDUCATION/TRAINING PROGRAM

## 2024-11-15 PROCEDURE — 86812 HLA TYPING A B OR C: CPT

## 2024-11-15 PROCEDURE — 3074F SYST BP LT 130 MM HG: CPT | Performed by: STUDENT IN AN ORGANIZED HEALTH CARE EDUCATION/TRAINING PROGRAM

## 2024-11-15 NOTE — PATIENT INSTRUCTIONS
Patient Instructions  Complete ordered labs  Let me know when doppler scan has been completed  I plan to start Plaquenil if CRP is still high and doppler scan is negative  Okay to increase tylenol to 2 tabs 3x daily as needed not to exceed 6 tabs daily   RTC in 2 months

## 2024-11-16 ENCOUNTER — APPOINTMENT (OUTPATIENT)
Dept: ULTRASOUND IMAGING | Age: 34
End: 2024-11-16
Payer: COMMERCIAL

## 2024-11-16 ENCOUNTER — HOSPITAL ENCOUNTER (EMERGENCY)
Age: 34
Discharge: HOME | End: 2024-11-16
Payer: COMMERCIAL

## 2024-11-16 VITALS
RESPIRATION RATE: 16 BRPM | HEART RATE: 71 BPM | DIASTOLIC BLOOD PRESSURE: 69 MMHG | TEMPERATURE: 98 F | OXYGEN SATURATION: 98 % | SYSTOLIC BLOOD PRESSURE: 139 MMHG

## 2024-11-16 DIAGNOSIS — M79.605 BILATERAL LOWER EXTREMITY PAIN: Primary | ICD-10-CM

## 2024-11-16 DIAGNOSIS — M79.604 BILATERAL LOWER EXTREMITY PAIN: Primary | ICD-10-CM

## 2024-11-16 PROCEDURE — 99284 EMERGENCY DEPT VISIT MOD MDM: CPT

## 2024-11-16 PROCEDURE — 93970 EXTREMITY STUDY: CPT

## 2024-11-16 ASSESSMENT — PAIN SCALES - GENERAL
PAINLEVEL_OUTOF10: 3
PAINLEVEL_OUTOF10: 0

## 2024-11-16 ASSESSMENT — PAIN DESCRIPTION - LOCATION: LOCATION: LEG

## 2024-11-16 ASSESSMENT — PAIN - FUNCTIONAL ASSESSMENT: PAIN_FUNCTIONAL_ASSESSMENT: 0-10

## 2024-11-16 NOTE — ED PROVIDER NOTES
normal, Mood normal.     DIAGNOSTIC RESULTS   LABS:    Labs Reviewed - No data to display    When ordered, only abnormal lab results are displayed. All other labs were within normal range or not returned as of this dictation.    EKG: When ordered, EKG's are interpreted by the Emergency Department Physician in the absence of a cardiologist.  Please see their note for interpretation of EKG.    RADIOLOGY:   Non-plain film images such as CT, Ultrasound and MRI are read by the radiologist. Plain radiographic images are visualized and preliminarily interpreted by the  ED Provider with the below findings:    Interpretation perthe Radiologist below, if available at the time of this note:    Vascular duplex lower extremity venous bilateral   Final Result   1.  No evidence of deep venous thrombosis in the right lower extremity.   2.  No evidence of deep venous thrombosis in the left lower extremity.         Electronically signed by Pablito Pitts MD        Vascular duplex lower extremity venous bilateral    Result Date: 11/16/2024  Bilateral lower extremity venous ultrasound INDICATION:  Pain and swelling Doppler ultrasound of both lower extremities was performed. COMPARISON:  None FINDINGS:  There is normal flow in the right great saphenous, common femoral, femoral, and popliteal veins. Normal compression and augmentation is demonstrated.  The proximal right calf veins are patent. There is normal flow in the left great saphenous, common femoral,  femoral, and popliteal veins. Normal compression and augmentation is demonstrated.  The proximal left calf veins are patent.     1.  No evidence of deep venous thrombosis in the right lower extremity. 2.  No evidence of deep venous thrombosis in the left lower extremity. Electronically signed by Pablito Pitts MD        PROCEDURES   Unless otherwise noted below, none     CRITICAL CARE   CRITICAL CARE NOTE:   N/A    CONSULTS:  None      EMERGENCY DEPARTMENT COURSE and MDM:   Vitals:     Vitals:    11/16/24 0813 11/16/24 0815   BP: 139/69 139/69   Pulse: 71    Resp: 16    Temp: 98 °F (36.7 °C)    SpO2: 98%        Patient was given thefollowing medications:  Medications - No data to display      Is this patient to be included in the SEP-1 Core Measure due to severe sepsis or septic shock?   No   Exclusion criteria - the patient is NOT to be included for SEP-1 Core Measure due to:  Infection is not suspected    MDM:    CC/HPI Summary, DDx, ED Course, and Reassessment:     Patient presents as above.  Emergent etiologies considered.  Patient seen and examined.  Work-up initiated secondary to presentation, physical exam findings, vital signs and medical chart review.    In brief, 34-year-old female presenting the emerged from today with bilateral leg pain, \"tightness\".    Prompted by hematology to get vascular Doppler ultrasound secondary to recent rheumatology evaluation to be initiated on Plaquenil.  Has history of unprovoked DVTs.    Patient has no obvious physical exam findings consistent with DVTs.  Has good strong pulses.  No open sores or wounds.  No signs of infection.    Obtain vascular ultrasounds-demonstrating no signs of underlying DVT.    Will advise starting the Plaquenil following with rheumatology and hematology the patient can otherwise be discharged home in stable condition.    History from : Patient    Limitations to history : None    Patient was given the following medications:  Medications - No data to display    Independent Imaging Interpretation by me: Vascular Doppler ultrasound demonstrating no signs of underlying DVT in the bilateral lower extremity    Chronic conditions affecting care: Unprovoked DVTs, chronic anticoagulation    Discussion with Other Profesionals : None    Social Determinants : None    Records Reviewed : Source reviewed recent rheumatological evaluation, reviewed care plan in place, reviewed labs imaging notations that are pertinent to her presentation

## 2024-11-16 NOTE — ED TRIAGE NOTES
Patient to ED with complaints of bilateral leg swelling. States her doctor recently lowered blood thinner dose and is concerned for clot.

## 2024-11-18 DIAGNOSIS — M06.4 INFLAMMATORY POLYARTHRITIS (HCC): Primary | ICD-10-CM

## 2024-11-18 LAB — HLA B27: NEGATIVE

## 2024-11-18 RX ORDER — HYDROXYCHLOROQUINE SULFATE 200 MG/1
200 TABLET, FILM COATED ORAL 2 TIMES DAILY
Qty: 180 TABLET | Refills: 0 | Status: SHIPPED | OUTPATIENT
Start: 2024-11-18

## 2024-11-21 ENCOUNTER — HOSPITAL ENCOUNTER (OUTPATIENT)
Dept: SLEEP CENTER | Age: 34
Discharge: HOME | End: 2024-11-21
Payer: COMMERCIAL

## 2024-11-21 VITALS
OXYGEN SATURATION: 97 % | HEIGHT: 62 IN | DIASTOLIC BLOOD PRESSURE: 70 MMHG | WEIGHT: 244 LBS | SYSTOLIC BLOOD PRESSURE: 116 MMHG | BODY MASS INDEX: 44.9 KG/M2 | HEART RATE: 66 BPM

## 2024-11-21 DIAGNOSIS — G47.33 OSA (OBSTRUCTIVE SLEEP APNEA): Primary | ICD-10-CM

## 2024-11-21 DIAGNOSIS — G47.10 HYPERSOMNIA: ICD-10-CM

## 2024-11-21 DIAGNOSIS — E66.01 MORBID OBESITY WITH BMI OF 40.0-44.9, ADULT: ICD-10-CM

## 2024-11-21 PROCEDURE — 99211 OFF/OP EST MAY X REQ PHY/QHP: CPT

## 2024-11-21 ASSESSMENT — SLEEP AND FATIGUE QUESTIONNAIRES
HOW LIKELY ARE YOU TO NOD OFF OR FALL ASLEEP WHILE LYING DOWN TO REST IN THE AFTERNOON WHEN CIRCUMSTANCES PERMIT: MODERATE CHANCE OF DOZING
HOW LIKELY ARE YOU TO NOD OFF OR FALL ASLEEP WHILE SITTING INACTIVE IN A PUBLIC PLACE: WOULD NEVER DOZE
HOW LIKELY ARE YOU TO NOD OFF OR FALL ASLEEP WHEN YOU ARE A PASSENGER IN A CAR FOR AN HOUR WITHOUT A BREAK: WOULD NEVER DOZE
HOW LIKELY ARE YOU TO NOD OFF OR FALL ASLEEP IN A CAR, WHILE STOPPED FOR A FEW MINUTES IN TRAFFIC: WOULD NEVER DOZE
HOW LIKELY ARE YOU TO NOD OFF OR FALL ASLEEP WHILE SITTING QUIETLY AFTER LUNCH WITHOUT ALCOHOL: SLIGHT CHANCE OF DOZING
HOW LIKELY ARE YOU TO NOD OFF OR FALL ASLEEP WHILE WATCHING TV: SLIGHT CHANCE OF DOZING
ESS TOTAL SCORE: 5
HOW LIKELY ARE YOU TO NOD OFF OR FALL ASLEEP WHILE SITTING AND READING: SLIGHT CHANCE OF DOZING
HOW LIKELY ARE YOU TO NOD OFF OR FALL ASLEEP WHILE SITTING AND TALKING TO SOMEONE: WOULD NEVER DOZE

## 2024-11-21 NOTE — CONSULTS
[facility]      Attila Hamm MD, FACP, [facility]  Ash Luis MD, [facility]  Brennen Hester MD, [facility]  Lety Hendrix DO      [address]  [address]  [place] [address]   PH: [phone]  F: [phone]     Subjective:     Patient ID: Cesilia Hayward is a 34 y.o. female, referred to the sleep center for   Chief Complaint   Patient presents with    R53.83    G47.10   .    Referring physician:  Karlo Ybarra [facility] Provider      History:has been referred for the DEISY    Symptoms:   [x]  Snoring                                                                [x]  Dry Mouth  [x]  Choking                                                               [x]  Morning Headaches  [x]  Gasping for Air                                                   []  Trouble Falling asleep  [x]  Tired during the daytime                                    []  Trouble Staying Asleep  [x]  Tired when you wake up                                    [x]  Weight Gain in Last 5 Years  [x]  Wake up frequently at night                                []  Weight Loss in Last 5 Years  []  Shortness Of Breath                                            []  Shift Worker  []  Coughing                                                             []  Smoker (Previous or Current)  []  Chest Pain                                                         [x]  Anxiety  []  Trouble keeping your legs still at night                 [x]  Depression  []  Kicking your legs in your sleep                            []  Insomnia     [x]  Palpitation  [x]  Stop breathing      []  Other:     Significant Co-morbidities:  []  Congestive Heart Failure     []  COPD         []  Stroke (Past 30 Days)      []  Supplemental Oxygen Usage       []  Cognitive Impairment      []  Neuromuscular Problems  []  Epilepsy/Neurological Disorders           Social History     Socioeconomic History    Marital status:

## 2024-12-11 DIAGNOSIS — E07.9 THYROID DISEASE: ICD-10-CM

## 2024-12-11 RX ORDER — LEVOTHYROXINE SODIUM 125 UG/1
TABLET ORAL
Qty: 30 TABLET | Refills: 3 | Status: SHIPPED | OUTPATIENT
Start: 2024-12-11

## 2024-12-16 ENCOUNTER — OFFICE VISIT (OUTPATIENT)
Dept: INTERNAL MEDICINE CLINIC | Age: 34
End: 2024-12-16
Payer: COMMERCIAL

## 2024-12-16 VITALS
BODY MASS INDEX: 44.02 KG/M2 | SYSTOLIC BLOOD PRESSURE: 120 MMHG | HEART RATE: 82 BPM | WEIGHT: 239.2 LBS | RESPIRATION RATE: 20 BRPM | OXYGEN SATURATION: 100 % | DIASTOLIC BLOOD PRESSURE: 78 MMHG | HEIGHT: 62 IN

## 2024-12-16 DIAGNOSIS — D50.9 IRON DEFICIENCY ANEMIA, UNSPECIFIED IRON DEFICIENCY ANEMIA TYPE: ICD-10-CM

## 2024-12-16 DIAGNOSIS — R39.9 UTI SYMPTOMS: Primary | ICD-10-CM

## 2024-12-16 DIAGNOSIS — E55.9 VITAMIN D DEFICIENCY: ICD-10-CM

## 2024-12-16 DIAGNOSIS — I10 ESSENTIAL HYPERTENSION: ICD-10-CM

## 2024-12-16 DIAGNOSIS — E07.9 THYROID DISEASE: ICD-10-CM

## 2024-12-16 DIAGNOSIS — I26.94 MULTIPLE SUBSEGMENTAL PULMONARY EMBOLI WITHOUT ACUTE COR PULMONALE (HCC): ICD-10-CM

## 2024-12-16 LAB
BILIRUBIN, POC: NORMAL
BLOOD URINE, POC: NORMAL
CLARITY, POC: NORMAL
COLOR, POC: NORMAL
GLUCOSE URINE, POC: NORMAL MG/DL
KETONES, POC: NORMAL MG/DL
LEUKOCYTE EST, POC: NORMAL
NITRITE, POC: NORMAL
PH, POC: 5.5
PROTEIN, POC: NORMAL MG/DL
SPECIFIC GRAVITY, POC: 1.02
UROBILINOGEN, POC: 0.2 MG/DL

## 2024-12-16 PROCEDURE — 99214 OFFICE O/P EST MOD 30 MIN: CPT | Performed by: NURSE PRACTITIONER

## 2024-12-16 PROCEDURE — G8427 DOCREV CUR MEDS BY ELIG CLIN: HCPCS | Performed by: NURSE PRACTITIONER

## 2024-12-16 PROCEDURE — 1036F TOBACCO NON-USER: CPT | Performed by: NURSE PRACTITIONER

## 2024-12-16 PROCEDURE — 81002 URINALYSIS NONAUTO W/O SCOPE: CPT | Performed by: NURSE PRACTITIONER

## 2024-12-16 PROCEDURE — G8417 CALC BMI ABV UP PARAM F/U: HCPCS | Performed by: NURSE PRACTITIONER

## 2024-12-16 PROCEDURE — 3074F SYST BP LT 130 MM HG: CPT | Performed by: NURSE PRACTITIONER

## 2024-12-16 PROCEDURE — 3078F DIAST BP <80 MM HG: CPT | Performed by: NURSE PRACTITIONER

## 2024-12-16 PROCEDURE — G8484 FLU IMMUNIZE NO ADMIN: HCPCS | Performed by: NURSE PRACTITIONER

## 2024-12-16 RX ORDER — LISINOPRIL 5 MG/1
5 TABLET ORAL DAILY
Qty: 30 TABLET | Refills: 5 | Status: SHIPPED | OUTPATIENT
Start: 2024-12-16

## 2024-12-16 ASSESSMENT — ENCOUNTER SYMPTOMS
CHEST TIGHTNESS: 0
APNEA: 0
NAUSEA: 0
VOMITING: 0
SHORTNESS OF BREATH: 0
SINUS PAIN: 0
DIARRHEA: 0
COUGH: 0
ABDOMINAL PAIN: 0
COLOR CHANGE: 0
SINUS PRESSURE: 0

## 2024-12-16 NOTE — PROGRESS NOTES
Cesilia KAMARA Clos  1990 12/16/24    Chief Complaint   Patient presents with    Follow-up     4 month follow up    Abdominal Pain     Burning during urination, frequent urination, lower back pain sx started 2 weeks ago       SUBJECTIVE:      4 month follow up:    The patient is currently taking all hypertensive medications compliantly without c/o of any side effects.  Denies chest pain, dyspnea, edema, palpiations. Blood pressure has been averaging  120-130/80 over the last 4 months since being on Lisinopril 5 mg daily. She initially felt off with a HA and generally hazy minded, but after a few weeks this resolved and her BP has been normal.    Over the last 1-2 weeks she has been having issues with urinary frequency, occasional dysuria, and lower back pain. Denies any flank pain, hematuria, fevers, chills. POCT UA with small leukocytes, but otherwise benign.     Had follow with Dr Armenta 7/30/24 and continues on Eliquis. All hyper-coag workups were WNL. 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 Dose is 2.5 mg BID. Recent D-dimer levels were WNL.     Patient continues to be compliant with synthroid regiment for her hypothyroidism.   Denies c/o fatigue, weight gain or loss, heat or cold intolerance, diarrhea, or other GI symptoms. Continues on 125 mcg daily.   Lab Results   Component Value Date    TSH 3.76 08/05/2024    TSHHS 4.040 07/28/2023     Vit D Def- continues on 2000 iu daily.     Review of Systems   Constitutional:  Negative for activity change, appetite change, fatigue and fever.   HENT:  Negative for congestion, nosebleeds, sinus pressure and sinus pain.    Respiratory:  Negative for apnea, cough, chest tightness and shortness of breath.    Cardiovascular:  Negative for chest pain and palpitations.   Gastrointestinal:  Negative for abdominal pain, diarrhea, nausea and vomiting.   Genitourinary:  Negative for difficulty urinating, flank pain and hematuria.

## 2024-12-17 LAB — BACTERIA UR CULT: NORMAL

## 2024-12-18 RX ORDER — GABAPENTIN 300 MG/1
300 CAPSULE ORAL 2 TIMES DAILY
Qty: 60 CAPSULE | Refills: 0 | Status: SHIPPED | OUTPATIENT
Start: 2024-12-18 | End: 2025-01-17

## 2024-12-30 RX ORDER — ONDANSETRON 4 MG/1
4 TABLET, FILM COATED ORAL 3 TIMES DAILY PRN
Qty: 30 TABLET | Refills: 0 | Status: SHIPPED | OUTPATIENT
Start: 2024-12-30

## 2025-01-03 ENCOUNTER — TELEPHONE (OUTPATIENT)
Dept: SLEEP CENTER | Age: 35
End: 2025-01-03

## 2025-02-05 ENCOUNTER — OFFICE VISIT (OUTPATIENT)
Dept: INTERNAL MEDICINE CLINIC | Age: 35
End: 2025-02-05

## 2025-02-05 VITALS
DIASTOLIC BLOOD PRESSURE: 66 MMHG | OXYGEN SATURATION: 100 % | HEART RATE: 78 BPM | SYSTOLIC BLOOD PRESSURE: 118 MMHG | HEIGHT: 62 IN | RESPIRATION RATE: 22 BRPM | WEIGHT: 240 LBS | BODY MASS INDEX: 44.16 KG/M2

## 2025-02-05 DIAGNOSIS — R09.81 NASAL CONGESTION: Primary | ICD-10-CM

## 2025-02-05 LAB
INFLUENZA A ANTIBODY: NEGATIVE
INFLUENZA B ANTIBODY: NEGATIVE

## 2025-02-05 PROCEDURE — 99213 OFFICE O/P EST LOW 20 MIN: CPT | Performed by: NURSE PRACTITIONER

## 2025-02-05 PROCEDURE — 87804 INFLUENZA ASSAY W/OPTIC: CPT | Performed by: NURSE PRACTITIONER

## 2025-02-05 PROCEDURE — 3078F DIAST BP <80 MM HG: CPT | Performed by: NURSE PRACTITIONER

## 2025-02-05 PROCEDURE — 3074F SYST BP LT 130 MM HG: CPT | Performed by: NURSE PRACTITIONER

## 2025-02-05 RX ORDER — LEVOFLOXACIN 500 MG/1
500 TABLET, FILM COATED ORAL DAILY
Qty: 10 TABLET | Refills: 0 | Status: SHIPPED | OUTPATIENT
Start: 2025-02-05 | End: 2025-02-15

## 2025-02-05 SDOH — ECONOMIC STABILITY: FOOD INSECURITY: WITHIN THE PAST 12 MONTHS, YOU WORRIED THAT YOUR FOOD WOULD RUN OUT BEFORE YOU GOT MONEY TO BUY MORE.: NEVER TRUE

## 2025-02-05 SDOH — ECONOMIC STABILITY: FOOD INSECURITY: WITHIN THE PAST 12 MONTHS, THE FOOD YOU BOUGHT JUST DIDN'T LAST AND YOU DIDN'T HAVE MONEY TO GET MORE.: NEVER TRUE

## 2025-02-05 ASSESSMENT — ENCOUNTER SYMPTOMS
NAUSEA: 0
DIARRHEA: 0
APNEA: 0
COLOR CHANGE: 0
SINUS PAIN: 1
VOMITING: 0
COUGH: 0
CHEST TIGHTNESS: 0
SHORTNESS OF BREATH: 0
RHINORRHEA: 1
ABDOMINAL PAIN: 0
SINUS PRESSURE: 1

## 2025-02-05 ASSESSMENT — PATIENT HEALTH QUESTIONNAIRE - PHQ9
SUM OF ALL RESPONSES TO PHQ QUESTIONS 1-9: 0
SUM OF ALL RESPONSES TO PHQ QUESTIONS 1-9: 0
1. LITTLE INTEREST OR PLEASURE IN DOING THINGS: NOT AT ALL
SUM OF ALL RESPONSES TO PHQ QUESTIONS 1-9: 0
2. FEELING DOWN, DEPRESSED OR HOPELESS: NOT AT ALL
DEPRESSION UNABLE TO ASSESS: FUNCTIONAL CAPACITY MOTIVATION LIMITS ACCURACY
SUM OF ALL RESPONSES TO PHQ9 QUESTIONS 1 & 2: 0
SUM OF ALL RESPONSES TO PHQ QUESTIONS 1-9: 0

## 2025-02-05 NOTE — PROGRESS NOTES
Cesilia KAMARA Clos  1990 02/05/25    Chief Complaint   Patient presents with    Ear Pain     Post nasal drip, nausea, sore throat, nasal congestion, sinus pressure, HA, cough, vomiting sx started 1 weeks ago Pt was exposed to the flu       SUBJECTIVE:      Cesilia presents to the office this morning for c/o ongoing PND, nausea, pharyngitis, nasal congestion, and sinus pain/pressure over the last 7-10 days. She was recently bowling for her son's birthday x 3 days ago and found out that her son's friend testing positive for flu. Denies any nausea, emesis, diarrhea. Does endorse chills. Has been taking Mucinex and Flonase with modest benefit.     Review of Systems   Constitutional:  Positive for fatigue. Negative for activity change, appetite change and fever.   HENT:  Positive for congestion, ear pain (left), postnasal drip, rhinorrhea, sinus pressure and sinus pain. Negative for nosebleeds.    Respiratory:  Negative for apnea, cough, chest tightness and shortness of breath.    Cardiovascular:  Negative for chest pain and palpitations.   Gastrointestinal:  Negative for abdominal pain, diarrhea, nausea and vomiting.   Genitourinary:  Negative for difficulty urinating, flank pain and hematuria.   Musculoskeletal:  Negative for arthralgias, joint swelling and myalgias.   Skin:  Negative for color change and rash.   Neurological:  Negative for dizziness, light-headedness and headaches.   Psychiatric/Behavioral: Negative.  Negative for behavioral problems.        OBJECTIVE:    /66   Pulse 78   Resp 22   Ht 1.575 m (5' 2.01\")   Wt 108.9 kg (240 lb)   SpO2 100%   BMI 43.89 kg/m²     Physical Exam  Constitutional:       Appearance: She is well-developed.   HENT:      Right Ear: External ear normal.      Left Ear: External ear normal.      Nose: Mucosal edema and rhinorrhea present.      Mouth/Throat:      Pharynx: No oropharyngeal exudate or posterior oropharyngeal erythema.   Eyes:      Conjunctiva/sclera:

## 2025-02-12 RX ORDER — ONDANSETRON 4 MG/1
4 TABLET, FILM COATED ORAL 3 TIMES DAILY PRN
Qty: 30 TABLET | Refills: 0 | Status: SHIPPED | OUTPATIENT
Start: 2025-02-12

## 2025-02-17 ENCOUNTER — HOSPITAL ENCOUNTER (OUTPATIENT)
Dept: INFUSION THERAPY | Age: 35
Discharge: HOME | End: 2025-02-17

## 2025-02-17 ENCOUNTER — OFFICE VISIT (OUTPATIENT)
Dept: ONCOLOGY | Age: 35
End: 2025-02-17

## 2025-02-17 VITALS
DIASTOLIC BLOOD PRESSURE: 64 MMHG | OXYGEN SATURATION: 100 % | HEIGHT: 62 IN | SYSTOLIC BLOOD PRESSURE: 118 MMHG | HEART RATE: 61 BPM | WEIGHT: 246 LBS | TEMPERATURE: 98 F | BODY MASS INDEX: 45.27 KG/M2

## 2025-02-17 DIAGNOSIS — I26.94 MULTIPLE SUBSEGMENTAL PULMONARY EMBOLI WITHOUT ACUTE COR PULMONALE (HCC): ICD-10-CM

## 2025-02-17 DIAGNOSIS — I26.94 MULTIPLE SUBSEGMENTAL PULMONARY EMBOLI WITHOUT ACUTE COR PULMONALE (HCC): Primary | ICD-10-CM

## 2025-02-17 LAB
BASOPHILS # BLD: 0.02 K/UL
BASOPHILS NFR BLD: 0 % (ref 0–1)
EOSINOPHIL # BLD: 0.08 K/UL
EOSINOPHILS RELATIVE PERCENT: 2 % (ref 0–3)
ERYTHROCYTE [DISTWIDTH] IN BLOOD BY AUTOMATED COUNT: 13.1 % (ref 11.7–14.9)
FERRITIN SERPL-MCNC: 62 NG/ML (ref 15–150)
HCT VFR BLD AUTO: 39.3 % (ref 37–47)
HGB BLD-MCNC: 12.6 G/DL (ref 12.5–16)
IRON SATN MFR SERPL: 17 % (ref 15–50)
IRON SERPL-MCNC: 47 UG/DL (ref 37–145)
LYMPHOCYTES NFR BLD: 1.32 K/UL
LYMPHOCYTES RELATIVE PERCENT: 28 % (ref 24–44)
MCH RBC QN AUTO: 30.7 PG (ref 27–31)
MCHC RBC AUTO-ENTMCNC: 32.1 G/DL (ref 32–36)
MCV RBC AUTO: 95.6 FL (ref 78–100)
MONOCYTES NFR BLD: 0.37 K/UL
MONOCYTES NFR BLD: 8 % (ref 0–4)
NEUTROPHILS NFR BLD: 62 % (ref 36–66)
NEUTS SEG NFR BLD: 2.94 K/UL
PLATELET # BLD AUTO: 280 K/UL (ref 140–440)
PMV BLD AUTO: 9.6 FL (ref 7.5–11.1)
RBC # BLD AUTO: 4.11 M/UL (ref 4.2–5.4)
TIBC SERPL-MCNC: 281 UG/DL (ref 260–445)
UNSATURATED IRON BINDING CAPACITY: 234 UG/DL (ref 110–370)
WBC OTHER # BLD: 4.7 K/UL (ref 4–10.5)

## 2025-02-17 PROCEDURE — 83550 IRON BINDING TEST: CPT

## 2025-02-17 PROCEDURE — 85025 COMPLETE CBC W/AUTO DIFF WBC: CPT

## 2025-02-17 PROCEDURE — 3074F SYST BP LT 130 MM HG: CPT | Performed by: INTERNAL MEDICINE

## 2025-02-17 PROCEDURE — 99212 OFFICE O/P EST SF 10 MIN: CPT

## 2025-02-17 PROCEDURE — 83540 ASSAY OF IRON: CPT

## 2025-02-17 PROCEDURE — 85045 AUTOMATED RETICULOCYTE COUNT: CPT

## 2025-02-17 PROCEDURE — 99213 OFFICE O/P EST LOW 20 MIN: CPT | Performed by: INTERNAL MEDICINE

## 2025-02-17 PROCEDURE — 82728 ASSAY OF FERRITIN: CPT

## 2025-02-17 PROCEDURE — 36415 COLL VENOUS BLD VENIPUNCTURE: CPT

## 2025-02-17 PROCEDURE — 3078F DIAST BP <80 MM HG: CPT | Performed by: INTERNAL MEDICINE

## 2025-02-17 NOTE — PROGRESS NOTES
MA Rooming Questions  Patient: Cesilia Hayward  MRN: [id number]    Date: 2/17/2025        1. Do you have any new issues?   no         2. Do you need any refills on medications?    no    3. Have you had any imaging done since your last visit?   no    4. Have you been hospitalized or seen in the emergency room since your last visit here?   No    5. Did the patient have a depression screening completed today? No    No data recorded     PHQ-9 Given to (if applicable):               PHQ-9 Score (if applicable):                     [] Positive     []  Negative              Does question #9 need addressed (if applicable)                     [] Yes    []  No               Malena Marshall MA      
is normal  NEUROLOGIC: awake, alert, oriented to name, place and time. Motor skills grossly intact.   SKIN: appears intact, normal skin color, normal texture, normal turgor, no jaundice.   EXTREMITIES: no LE edema, no clubbing, no leg swelling, no cyanosis,        Labs:  Hematology:  Lab Results   Component Value Date    WBC 5.8 10/25/2024    RBC 4.24 10/25/2024    HGB 12.9 10/25/2024    HCT 40.9 10/25/2024    MCV 96.5 10/25/2024    MCH 30.4 10/25/2024    MCHC 31.5 (L) 10/25/2024    RDW 13.2 10/25/2024     10/25/2024    MPV 9.8 10/25/2024    BASOPCT 0.6 07/30/2024    LYMPHOPCT 31.0 07/30/2024    MONOPCT 8.1 (H) 07/30/2024    EOSABS 0.1 07/30/2024    BASOSABS 0.0 07/30/2024    LYMPHSABS 1.6 07/30/2024    MONOSABS 0.4 07/30/2024    DIFFTYPE AUTOMATED DIFFERENTIAL 07/30/2024     No results found for: \"ESR\"    Chemistry:  Lab Results   Component Value Date     10/25/2024    K 4.1 10/25/2024     10/25/2024    CO2 26 10/25/2024    BUN 9 10/25/2024    CREATININE 0.7 10/25/2024    GLUCOSE 91 10/25/2024    CALCIUM 9.2 10/25/2024    BILITOT 0.4 10/25/2024    ALKPHOS 88 10/25/2024    AST 18 10/25/2024    ALT 24 10/25/2024    LABGLOM >90 10/25/2024    GFRAA >60 03/27/2022    AGRATIO 1.8 02/25/2022    GLOB 2.7 03/29/2021    PHOS 3.1 10/25/2024    MG 2.1 07/28/2023     Lab Results   Component Value Date     07/30/2024    HOMOCYSTEINE 10.6 (H) 04/20/2022     No results found for: \"LD\"  Lab Results   Component Value Date    TSHHS 4.040 07/28/2023    T4FREE 1.5 08/05/2024    FT3 3.2 10/18/2010     Immunology:  No results found for: \"SPEP\", \"ALBUMINELP\", \"LABALPH\", \"LABBETA\", \"GAMGLOB\"    No results found for: \"KAPPAUVOL\", \"LAMBDAUVOL\", \"KLFLCR\"  No results found for: \"B2M\"  Coagulation Panel:  Lab Results   Component Value Date    PROTIME 12.2 02/13/2023    INR 0.95 02/13/2023    APTT 31.7 07/10/2018    DDIMER 0.32 07/30/2024     Anemia Panel:  Lab Results   Component Value Date    [id number] 344.4

## 2025-02-18 LAB
RETICS # AUTO: 0.08 M/UL
RETICS/RBC NFR AUTO: 1.8 % (ref 0.2–2)

## 2025-02-24 DIAGNOSIS — I10 ESSENTIAL HYPERTENSION: ICD-10-CM

## 2025-02-24 RX ORDER — LISINOPRIL 10 MG/1
10 TABLET ORAL DAILY
Qty: 30 TABLET | Refills: 2 | Status: SHIPPED | OUTPATIENT
Start: 2025-02-24

## 2025-02-27 ENCOUNTER — PATIENT MESSAGE (OUTPATIENT)
Dept: ONCOLOGY | Age: 35
End: 2025-02-27

## 2025-03-21 DIAGNOSIS — I10 ESSENTIAL HYPERTENSION: ICD-10-CM

## 2025-03-21 RX ORDER — DOCUSATE SODIUM 100 MG
100 CAPSULE ORAL 2 TIMES DAILY
Qty: 30 CAPSULE | Refills: 0 | Status: SHIPPED | OUTPATIENT
Start: 2025-03-21

## 2025-03-21 RX ORDER — LISINOPRIL 20 MG/1
20 TABLET ORAL DAILY
Qty: 30 TABLET | Refills: 5 | Status: SHIPPED | OUTPATIENT
Start: 2025-03-21

## 2025-03-26 DIAGNOSIS — I10 ESSENTIAL HYPERTENSION: ICD-10-CM

## 2025-03-26 RX ORDER — LISINOPRIL 20 MG/1
20 TABLET ORAL DAILY
Qty: 30 TABLET | Refills: 5 | Status: SHIPPED | OUTPATIENT
Start: 2025-03-26

## 2025-03-26 RX ORDER — DOCUSATE SODIUM 100 MG/1
100 CAPSULE, LIQUID FILLED ORAL 2 TIMES DAILY
Qty: 30 CAPSULE | Refills: 0 | Status: SHIPPED | OUTPATIENT
Start: 2025-03-26

## 2025-04-03 ENCOUNTER — OFFICE VISIT (OUTPATIENT)
Dept: INTERNAL MEDICINE CLINIC | Age: 35
End: 2025-04-03

## 2025-04-03 VITALS
RESPIRATION RATE: 22 BRPM | SYSTOLIC BLOOD PRESSURE: 122 MMHG | DIASTOLIC BLOOD PRESSURE: 74 MMHG | HEIGHT: 62 IN | OXYGEN SATURATION: 97 % | BODY MASS INDEX: 45.08 KG/M2 | WEIGHT: 245 LBS | HEART RATE: 74 BPM

## 2025-04-03 DIAGNOSIS — R05.2 SUBACUTE COUGH: Primary | ICD-10-CM

## 2025-04-03 PROCEDURE — 99213 OFFICE O/P EST LOW 20 MIN: CPT | Performed by: NURSE PRACTITIONER

## 2025-04-03 PROCEDURE — 3078F DIAST BP <80 MM HG: CPT | Performed by: NURSE PRACTITIONER

## 2025-04-03 PROCEDURE — 3074F SYST BP LT 130 MM HG: CPT | Performed by: NURSE PRACTITIONER

## 2025-04-03 RX ORDER — LOSARTAN POTASSIUM 50 MG/1
50 TABLET ORAL DAILY
Qty: 30 TABLET | Refills: 3 | Status: SHIPPED | OUTPATIENT
Start: 2025-04-03

## 2025-04-03 SDOH — ECONOMIC STABILITY: FOOD INSECURITY: WITHIN THE PAST 12 MONTHS, YOU WORRIED THAT YOUR FOOD WOULD RUN OUT BEFORE YOU GOT MONEY TO BUY MORE.: NEVER TRUE

## 2025-04-03 SDOH — ECONOMIC STABILITY: FOOD INSECURITY: WITHIN THE PAST 12 MONTHS, THE FOOD YOU BOUGHT JUST DIDN'T LAST AND YOU DIDN'T HAVE MONEY TO GET MORE.: NEVER TRUE

## 2025-04-03 ASSESSMENT — ENCOUNTER SYMPTOMS
COUGH: 1
CHEST TIGHTNESS: 0
SHORTNESS OF BREATH: 0
COLOR CHANGE: 0
DIARRHEA: 0
SINUS PRESSURE: 0
APNEA: 0
VOMITING: 0
NAUSEA: 0
SINUS PAIN: 0
ABDOMINAL PAIN: 0

## 2025-04-03 NOTE — PROGRESS NOTES
Cesilia KAMARA Clos  1990 04/03/25    Chief Complaint   Patient presents with    Cough     Nasal congestion, post nasal drip, scratchy throat sx started 1 month ago       SUBJECTIVE:      Cesilia presents to the office this morning for c/o a dry cough, PND, scratchy throat, and nasal congestion which has been ongoing over the last month. The cough is generally non productive and she is not experiencing any wheezing/SOB. Has been taking flonase, mucinex, and advil cold and sinus with modest benefit. Symptoms are worse at night.     Review of Systems   Constitutional:  Negative for activity change, appetite change, fatigue and fever.   HENT:  Negative for congestion, nosebleeds, sinus pressure and sinus pain.    Respiratory:  Positive for cough. Negative for apnea, chest tightness and shortness of breath.    Cardiovascular:  Negative for chest pain and palpitations.   Gastrointestinal:  Negative for abdominal pain, diarrhea, nausea and vomiting.   Genitourinary:  Negative for difficulty urinating, flank pain and hematuria.   Musculoskeletal:  Negative for arthralgias, joint swelling and myalgias.   Skin:  Negative for color change and rash.   Neurological:  Negative for dizziness, light-headedness and headaches.   Psychiatric/Behavioral: Negative.  Negative for behavioral problems.        OBJECTIVE:    /74   Pulse 74   Resp 22   Ht 1.575 m (5' 2.01\")   Wt 111.1 kg (245 lb)   SpO2 97%   BMI 44.80 kg/m²     Physical Exam  Constitutional:       General: She is not in acute distress.     Appearance: She is well-developed. She is not diaphoretic.   HENT:      Head: Normocephalic and atraumatic.      Mouth/Throat:      Pharynx: No oropharyngeal exudate.   Cardiovascular:      Rate and Rhythm: Normal rate and regular rhythm.      Heart sounds: Normal heart sounds. No murmur heard.     No friction rub.   Pulmonary:      Effort: Pulmonary effort is normal. No respiratory distress.      Breath sounds: Normal breath

## 2025-04-16 ENCOUNTER — OFFICE VISIT (OUTPATIENT)
Dept: INTERNAL MEDICINE CLINIC | Age: 35
End: 2025-04-16

## 2025-04-16 VITALS
RESPIRATION RATE: 20 BRPM | HEIGHT: 62 IN | WEIGHT: 241 LBS | DIASTOLIC BLOOD PRESSURE: 78 MMHG | HEART RATE: 83 BPM | SYSTOLIC BLOOD PRESSURE: 116 MMHG | OXYGEN SATURATION: 99 % | BODY MASS INDEX: 44.35 KG/M2

## 2025-04-16 DIAGNOSIS — Z13.220 SCREENING FOR LIPID DISORDERS: ICD-10-CM

## 2025-04-16 DIAGNOSIS — I10 ESSENTIAL HYPERTENSION: Primary | ICD-10-CM

## 2025-04-16 DIAGNOSIS — E07.9 THYROID DISEASE: ICD-10-CM

## 2025-04-16 DIAGNOSIS — I26.94 MULTIPLE SUBSEGMENTAL PULMONARY EMBOLI WITHOUT ACUTE COR PULMONALE (HCC): ICD-10-CM

## 2025-04-16 DIAGNOSIS — E55.9 VITAMIN D DEFICIENCY: ICD-10-CM

## 2025-04-16 DIAGNOSIS — D50.9 IRON DEFICIENCY ANEMIA, UNSPECIFIED IRON DEFICIENCY ANEMIA TYPE: ICD-10-CM

## 2025-04-16 PROCEDURE — 99214 OFFICE O/P EST MOD 30 MIN: CPT | Performed by: NURSE PRACTITIONER

## 2025-04-16 PROCEDURE — 3074F SYST BP LT 130 MM HG: CPT | Performed by: NURSE PRACTITIONER

## 2025-04-16 PROCEDURE — 3078F DIAST BP <80 MM HG: CPT | Performed by: NURSE PRACTITIONER

## 2025-04-16 RX ORDER — LEVOTHYROXINE SODIUM 125 UG/1
TABLET ORAL
Qty: 30 TABLET | Refills: 3 | Status: SHIPPED | OUTPATIENT
Start: 2025-04-16

## 2025-04-16 ASSESSMENT — ENCOUNTER SYMPTOMS
VOMITING: 0
DIARRHEA: 0
APNEA: 0
ABDOMINAL PAIN: 0
SHORTNESS OF BREATH: 0
CHEST TIGHTNESS: 0
COLOR CHANGE: 0
SINUS PRESSURE: 0
NAUSEA: 0
COUGH: 0
SINUS PAIN: 0

## 2025-04-16 NOTE — PROGRESS NOTES
Cesilia ANH Hayward  1990 04/16/25    Chief Complaint   Patient presents with    Follow-up     4 month follow up       SUBJECTIVE:      4 month follow up:    The patient is currently taking all hypertensive medications compliantly without c/o of any side effects.  Denies chest pain, dyspnea, edema, palpiations. Blood pressure has been averaging  115-120/80 over the last 2-3 weeks. She was transitioned from Lisinopril to Losartan on 4/3/25 due to concern for possible ACE inhibitor induced cough. The BP has remained in good range and cough has nearly resolved.     She remains on indefinite OAC with low dose eliquis 2.5 mg BID. . She also continues oral ferrous sulfate for JOHN per recs of HemOnc, Dr Armenta. Hgb, Fe and ferritin levels stable 2/17/25 with most recent labs.     Patient continues to be compliant with synthroid regiment for her hypothyroidism.   Denies c/o fatigue, weight gain or loss, heat or cold intolerance, diarrhea, or other GI symptoms. Continues on 125 mcg daily.    Lab Results   Component Value Date    TSH 3.76 08/05/2024    TSHHS 4.040 07/28/2023     Vit D Def- continues on 2000 iu daily.      Review of Systems   Constitutional:  Negative for activity change, appetite change, fatigue and fever.   HENT:  Negative for congestion, nosebleeds, sinus pressure and sinus pain.    Respiratory:  Negative for apnea, cough, chest tightness and shortness of breath.    Cardiovascular:  Negative for chest pain and palpitations.   Gastrointestinal:  Negative for abdominal pain, diarrhea, nausea and vomiting.   Genitourinary:  Negative for difficulty urinating, flank pain and hematuria.   Musculoskeletal:  Negative for arthralgias, joint swelling and myalgias.   Skin:  Negative for color change and rash.   Neurological:  Negative for dizziness, light-headedness and headaches.   Psychiatric/Behavioral: Negative.  Negative for behavioral problems.        OBJECTIVE:    /78   Pulse 83   Resp 20   Ht 1.575 m (5' 
Photo Preface (Leave Blank If You Do Not Want): Photographs were obtained today of the right upper arm
Detail Level: Zone

## 2025-05-23 ENCOUNTER — OFFICE VISIT (OUTPATIENT)
Dept: INTERNAL MEDICINE CLINIC | Age: 35
End: 2025-05-23

## 2025-05-23 VITALS
RESPIRATION RATE: 20 BRPM | WEIGHT: 242.9 LBS | SYSTOLIC BLOOD PRESSURE: 116 MMHG | DIASTOLIC BLOOD PRESSURE: 78 MMHG | HEIGHT: 62 IN | OXYGEN SATURATION: 94 % | BODY MASS INDEX: 44.7 KG/M2 | HEART RATE: 66 BPM

## 2025-05-23 DIAGNOSIS — R39.9 UTI SYMPTOMS: ICD-10-CM

## 2025-05-23 DIAGNOSIS — R10.9 BILATERAL FLANK PAIN: Primary | ICD-10-CM

## 2025-05-23 LAB
BILIRUBIN, POC: NORMAL
BLOOD URINE, POC: NORMAL
CLARITY, POC: CLEAR
COLOR, POC: NORMAL
GLUCOSE URINE, POC: NORMAL MG/DL
KETONES, POC: NORMAL MG/DL
LEUKOCYTE EST, POC: NORMAL
NITRITE, POC: NORMAL
PH, POC: 5.5
PROTEIN, POC: NORMAL MG/DL
SPECIFIC GRAVITY, POC: 1.02
UROBILINOGEN, POC: 0.2 MG/DL

## 2025-05-23 PROCEDURE — 3074F SYST BP LT 130 MM HG: CPT | Performed by: NURSE PRACTITIONER

## 2025-05-23 PROCEDURE — 81002 URINALYSIS NONAUTO W/O SCOPE: CPT | Performed by: NURSE PRACTITIONER

## 2025-05-23 PROCEDURE — 3078F DIAST BP <80 MM HG: CPT | Performed by: NURSE PRACTITIONER

## 2025-05-23 PROCEDURE — 99214 OFFICE O/P EST MOD 30 MIN: CPT | Performed by: NURSE PRACTITIONER

## 2025-05-23 RX ORDER — CIPROFLOXACIN 250 MG/1
250 TABLET, FILM COATED ORAL 2 TIMES DAILY
Qty: 14 TABLET | Refills: 0 | Status: SHIPPED | OUTPATIENT
Start: 2025-05-23 | End: 2025-05-30

## 2025-05-23 ASSESSMENT — ENCOUNTER SYMPTOMS
DIARRHEA: 0
SHORTNESS OF BREATH: 0
NAUSEA: 0
COLOR CHANGE: 0
ABDOMINAL PAIN: 0
APNEA: 0
SINUS PAIN: 0
COUGH: 0
VOMITING: 0
SINUS PRESSURE: 0
CHEST TIGHTNESS: 0

## 2025-05-23 NOTE — PROGRESS NOTES
reviewed.            No data to display                Return if symptoms worsen or fail to improve.    Pleas note this reports has been produced speech recognition software and may contain errors related to that system including errors in grammar, punctuation, and spelling, as well as words and phrases that may be appropriate. If there are any questions or concerns please feel free to contact the dictating provider for clarification.

## 2025-05-25 LAB — BACTERIA UR CULT: NORMAL

## 2025-05-27 ENCOUNTER — RESULTS FOLLOW-UP (OUTPATIENT)
Dept: INTERNAL MEDICINE CLINIC | Age: 35
End: 2025-05-27

## 2025-05-28 DIAGNOSIS — R10.9 BILATERAL FLANK PAIN: Primary | ICD-10-CM

## 2025-06-13 ENCOUNTER — APPOINTMENT (OUTPATIENT)
Dept: CT IMAGING | Age: 35
End: 2025-06-13

## 2025-06-13 ENCOUNTER — APPOINTMENT (OUTPATIENT)
Dept: GENERAL RADIOLOGY | Age: 35
End: 2025-06-13

## 2025-06-13 ENCOUNTER — HOSPITAL ENCOUNTER (EMERGENCY)
Age: 35
Discharge: HOME | End: 2025-06-13
Attending: EMERGENCY MEDICINE

## 2025-06-13 VITALS
RESPIRATION RATE: 16 BRPM | OXYGEN SATURATION: 100 % | HEART RATE: 61 BPM | HEIGHT: 62 IN | WEIGHT: 242 LBS | SYSTOLIC BLOOD PRESSURE: 96 MMHG | DIASTOLIC BLOOD PRESSURE: 64 MMHG | TEMPERATURE: 97.3 F | BODY MASS INDEX: 44.53 KG/M2

## 2025-06-13 DIAGNOSIS — R11.0 NAUSEA: ICD-10-CM

## 2025-06-13 DIAGNOSIS — R53.81 MALAISE AND FATIGUE: ICD-10-CM

## 2025-06-13 DIAGNOSIS — R10.9 FLANK PAIN: ICD-10-CM

## 2025-06-13 DIAGNOSIS — R68.89 FEELING UNWELL: Primary | ICD-10-CM

## 2025-06-13 DIAGNOSIS — R53.83 MALAISE AND FATIGUE: ICD-10-CM

## 2025-06-13 DIAGNOSIS — R42 DIZZINESS: ICD-10-CM

## 2025-06-13 LAB
ALBUMIN SERPL-MCNC: 4.2 G/DL (ref 3.4–5)
ALBUMIN/GLOB SERPL: 1.4 {RATIO}
ALP SERPL-CCNC: 82 U/L (ref 40–129)
ALT SERPL-CCNC: 20 U/L (ref 10–40)
ANION GAP SERPL CALCULATED.3IONS-SCNC: 12 MMOL/L (ref 9–17)
AST SERPL-CCNC: 15 U/L (ref 15–37)
B-HCG SERPL EIA 3RD IS-ACNC: <1 MIU/ML
BACTERIA URNS QL MICRO: ABNORMAL
BASOPHILS # BLD: 0.03 K/UL
BASOPHILS NFR BLD: 1 % (ref 0–1)
BILIRUB SERPL-MCNC: 0.3 MG/DL (ref 0–1)
BILIRUB UR QL STRIP: NEGATIVE
BNP SERPL-MCNC: <36 PG/ML (ref 0–125)
BUN SERPL-MCNC: 12 MG/DL (ref 7–20)
CALCIUM SERPL-MCNC: 9.1 MG/DL (ref 8.3–10.6)
CHARACTER UR: ABNORMAL
CHLORIDE SERPL-SCNC: 101 MMOL/L (ref 99–110)
CLARITY UR: CLEAR
CO2 SERPL-SCNC: 24 MMOL/L (ref 21–32)
COLOR UR: YELLOW
CREAT SERPL-MCNC: 0.7 MG/DL (ref 0.6–1.1)
EKG ATRIAL RATE: 59 BPM
EKG DIAGNOSIS: NORMAL
EKG P AXIS: 7 DEGREES
EKG P-R INTERVAL: 140 MS
EKG Q-T INTERVAL: 384 MS
EKG QRS DURATION: 86 MS
EKG QTC CALCULATION (BAZETT): 380 MS
EKG R AXIS: 5 DEGREES
EKG T AXIS: -10 DEGREES
EKG VENTRICULAR RATE: 59 BPM
EOSINOPHIL # BLD: 0.05 K/UL
EOSINOPHILS RELATIVE PERCENT: 1 % (ref 0–3)
EPI CELLS #/AREA URNS HPF: ABNORMAL /HPF
ERYTHROCYTE [DISTWIDTH] IN BLOOD BY AUTOMATED COUNT: 12.6 % (ref 11.7–14.9)
GFR, ESTIMATED: >90 ML/MIN/1.73M2
GLUCOSE SERPL-MCNC: 99 MG/DL (ref 74–99)
GLUCOSE UR STRIP-MCNC: NEGATIVE MG/DL
HCT VFR BLD AUTO: 39.1 % (ref 37–47)
HGB BLD-MCNC: 13 G/DL (ref 12.5–16)
HGB UR QL STRIP.AUTO: NEGATIVE
IMM GRANULOCYTES # BLD AUTO: 0.01 K/UL
IMM GRANULOCYTES NFR BLD: 0 %
KETONES UR STRIP-MCNC: NEGATIVE MG/DL
LEUKOCYTE ESTERASE UR QL STRIP: ABNORMAL
LIPASE SERPL-CCNC: 20 U/L (ref 13–60)
LYMPHOCYTES NFR BLD: 1.3 K/UL
LYMPHOCYTES RELATIVE PERCENT: 26 % (ref 24–44)
MAGNESIUM SERPL-MCNC: 2 MG/DL (ref 1.8–2.4)
MCH RBC QN AUTO: 30.6 PG (ref 27–31)
MCHC RBC AUTO-ENTMCNC: 33.2 G/DL (ref 32–36)
MCV RBC AUTO: 92 FL (ref 78–100)
MONOCYTES NFR BLD: 0.38 K/UL
MONOCYTES NFR BLD: 8 % (ref 0–5)
MUCOUS THREADS URNS QL MICRO: PRESENT
NEUTROPHILS NFR BLD: 65 % (ref 36–66)
NEUTS SEG NFR BLD: 3.24 K/UL
NITRITE UR QL STRIP: NEGATIVE
PH UR STRIP: 6.5 [PH] (ref 5–8)
PLATELET # BLD AUTO: 313 K/UL (ref 140–440)
PMV BLD AUTO: 9.5 FL (ref 7.5–11.1)
POTASSIUM SERPL-SCNC: 3.9 MMOL/L (ref 3.5–5.1)
PROT SERPL-MCNC: 7.2 G/DL (ref 6.4–8.2)
PROT UR STRIP-MCNC: NEGATIVE MG/DL
RBC # BLD AUTO: 4.25 M/UL (ref 4.2–5.4)
RBC #/AREA URNS HPF: ABNORMAL /HPF
SODIUM SERPL-SCNC: 137 MMOL/L (ref 136–145)
SP GR UR STRIP: 1.01 (ref 1–1.03)
TROPONIN I SERPL HS-MCNC: <6 NG/L (ref 0–14)
UROBILINOGEN UR STRIP-ACNC: 1 EU/DL (ref 0–1)
WBC #/AREA URNS HPF: ABNORMAL /HPF
WBC OTHER # BLD: 5 K/UL (ref 4–10.5)

## 2025-06-13 PROCEDURE — 84702 CHORIONIC GONADOTROPIN TEST: CPT

## 2025-06-13 PROCEDURE — 83690 ASSAY OF LIPASE: CPT

## 2025-06-13 PROCEDURE — 2500000003 HC RX 250 WO HCPCS: Performed by: EMERGENCY MEDICINE

## 2025-06-13 PROCEDURE — 83880 ASSAY OF NATRIURETIC PEPTIDE: CPT

## 2025-06-13 PROCEDURE — 6370000000 HC RX 637 (ALT 250 FOR IP): Performed by: EMERGENCY MEDICINE

## 2025-06-13 PROCEDURE — 70498 CT ANGIOGRAPHY NECK: CPT

## 2025-06-13 PROCEDURE — 99285 EMERGENCY DEPT VISIT HI MDM: CPT

## 2025-06-13 PROCEDURE — 93010 ELECTROCARDIOGRAM REPORT: CPT | Performed by: INTERNAL MEDICINE

## 2025-06-13 PROCEDURE — 83735 ASSAY OF MAGNESIUM: CPT

## 2025-06-13 PROCEDURE — 71045 X-RAY EXAM CHEST 1 VIEW: CPT

## 2025-06-13 PROCEDURE — 81001 URINALYSIS AUTO W/SCOPE: CPT

## 2025-06-13 PROCEDURE — 84484 ASSAY OF TROPONIN QUANT: CPT

## 2025-06-13 PROCEDURE — 2580000003 HC RX 258: Performed by: EMERGENCY MEDICINE

## 2025-06-13 PROCEDURE — 70450 CT HEAD/BRAIN W/O DYE: CPT

## 2025-06-13 PROCEDURE — 85025 COMPLETE CBC W/AUTO DIFF WBC: CPT

## 2025-06-13 PROCEDURE — 93005 ELECTROCARDIOGRAM TRACING: CPT | Performed by: EMERGENCY MEDICINE

## 2025-06-13 PROCEDURE — 80053 COMPREHEN METABOLIC PANEL: CPT

## 2025-06-13 PROCEDURE — 87086 URINE CULTURE/COLONY COUNT: CPT

## 2025-06-13 PROCEDURE — 6360000004 HC RX CONTRAST MEDICATION: Performed by: EMERGENCY MEDICINE

## 2025-06-13 RX ORDER — DIPHENHYDRAMINE HYDROCHLORIDE 50 MG/ML
25 INJECTION, SOLUTION INTRAMUSCULAR; INTRAVENOUS ONCE
Status: DISCONTINUED | OUTPATIENT
Start: 2025-06-13 | End: 2025-06-13 | Stop reason: HOSPADM

## 2025-06-13 RX ORDER — ONDANSETRON 4 MG/1
4 TABLET, ORALLY DISINTEGRATING ORAL 3 TIMES DAILY PRN
Qty: 21 TABLET | Refills: 0 | Status: SHIPPED | OUTPATIENT
Start: 2025-06-13

## 2025-06-13 RX ORDER — 0.9 % SODIUM CHLORIDE 0.9 %
1000 INTRAVENOUS SOLUTION INTRAVENOUS ONCE
Status: COMPLETED | OUTPATIENT
Start: 2025-06-13 | End: 2025-06-13

## 2025-06-13 RX ORDER — METOCLOPRAMIDE HYDROCHLORIDE 5 MG/ML
10 INJECTION INTRAMUSCULAR; INTRAVENOUS ONCE
Status: DISCONTINUED | OUTPATIENT
Start: 2025-06-13 | End: 2025-06-13 | Stop reason: HOSPADM

## 2025-06-13 RX ORDER — SODIUM CHLORIDE 0.9 % (FLUSH) 0.9 %
20 SYRINGE (ML) INJECTION
Status: COMPLETED | OUTPATIENT
Start: 2025-06-13 | End: 2025-06-13

## 2025-06-13 RX ORDER — MECLIZINE HYDROCHLORIDE 25 MG/1
25 TABLET ORAL 3 TIMES DAILY PRN
Qty: 15 TABLET | Refills: 0 | Status: SHIPPED | OUTPATIENT
Start: 2025-06-13 | End: 2025-06-23

## 2025-06-13 RX ORDER — ACETAMINOPHEN 500 MG
500 TABLET ORAL 4 TIMES DAILY PRN
Qty: 360 TABLET | Refills: 1 | Status: SHIPPED | OUTPATIENT
Start: 2025-06-13

## 2025-06-13 RX ORDER — IOPAMIDOL 755 MG/ML
100 INJECTION, SOLUTION INTRAVASCULAR
Status: COMPLETED | OUTPATIENT
Start: 2025-06-13 | End: 2025-06-13

## 2025-06-13 RX ORDER — MECLIZINE HCL 12.5 MG 12.5 MG/1
25 TABLET ORAL ONCE
Status: COMPLETED | OUTPATIENT
Start: 2025-06-13 | End: 2025-06-13

## 2025-06-13 RX ADMIN — SODIUM CHLORIDE, PRESERVATIVE FREE 20 ML: 5 INJECTION INTRAVENOUS at 13:43

## 2025-06-13 RX ADMIN — SODIUM CHLORIDE 1000 ML: 0.9 INJECTION, SOLUTION INTRAVENOUS at 12:04

## 2025-06-13 RX ADMIN — MECLIZINE HYDROCHLORIDE 25 MG: 12.5 TABLET ORAL at 12:02

## 2025-06-13 RX ADMIN — IOPAMIDOL 100 ML: 755 INJECTION, SOLUTION INTRAVENOUS at 13:39

## 2025-06-13 ASSESSMENT — ENCOUNTER SYMPTOMS
RESPIRATORY NEGATIVE: 1
NAUSEA: 1
ALLERGIC/IMMUNOLOGIC NEGATIVE: 1
EYES NEGATIVE: 1
BACK PAIN: 1

## 2025-06-13 ASSESSMENT — PAIN SCALES - GENERAL: PAINLEVEL_OUTOF10: 4

## 2025-06-13 ASSESSMENT — PAIN DESCRIPTION - LOCATION: LOCATION: BACK

## 2025-06-13 NOTE — ED PROVIDER NOTES
She is not having chest pain shortness of breath, dizziness nausea malaise fatigue flank pain body aches.  No travel sick contacts.  On exam she appears well vital signs are stable she has no weakness or numbness no slurred speech no facial droop no trauma normal finger-to-nose, NIH of 0, does have some flank pain.  No abdominal pain on my exam, no vision changes no drift etc. normal finger-to-nose.  I did not call a stroke alert she has had 2 weeks of symptoms.  Due to her symptoms I did do broad workup given labs imaging EKG CT head neck chest x-ray all of which are negative no large vessel occlusion no bleed no aneurysm labs are normal EKG is normal troponin is normal she was given Antivert, Benadryl and symptoms have improved on reevaluation she is doing well, resting comfortably texting on phone no distress unclear etiology of her symptoms it could be viral with dizziness and no energy and fatigue malaise, urine is contaminated with epithelial cells we will culture she has no dysuria.  Again patient stable discharge with medication given return precautions and follow-up info.  I will culture urine I do not think she has ACS or DVT or PE or AAA or stroke she appears nontoxic, nonseptic.  Discharge okay with plan.  Patient stable discharge given return precautions and follow-up info.    CLINICAL IMPRESSION:  Final diagnoses:   Feeling unwell   Dizziness   Nausea   Flank pain   Malaise and fatigue       (Please note that portions of this note may have been completed with a voice recognition program. Efforts were made to edit the dictations but occasionally words aremis-transcribed.)    DISPOSITION REFERRAL (if applicable):  Jean Paul Sampson, APRN - CNP  [address] [facility] [address]  [phone]    Schedule an appointment as soon as possible for a visit in 1 day      [facility] Emergency Department  [address] [facility] [address] [phone]    If symptoms  [facility]    Colleen Aguila DO  [address] [address]  [phone]    Schedule an appointment as soon as possible for a visit in 1 day  Neurology      DISPOSITION MEDICATIONS (if applicable):  New Prescriptions    ACETAMINOPHEN (TYLENOL) 500 MG TABLET    Take 1 tablet by mouth 4 times daily as needed for Pain    MECLIZINE (ANTIVERT) 25 MG TABLET    Take 1 tablet by mouth 3 times daily as needed for Dizziness    ONDANSETRON (ZOFRAN-ODT) 4 MG DISINTEGRATING TABLET    Take 1 tablet by mouth 3 times daily as needed for Nausea or Vomiting          DO Magui Leiva James, DO  06/13/25 6418

## 2025-06-14 LAB
MICROORGANISM SPEC CULT: NORMAL
SERVICE CMNT-IMP: NORMAL
SPECIMEN DESCRIPTION: NORMAL

## 2025-06-23 ENCOUNTER — HOSPITAL ENCOUNTER (OUTPATIENT)
Dept: CT IMAGING | Age: 35
Discharge: HOME | End: 2025-06-23

## 2025-06-23 DIAGNOSIS — R10.9 BILATERAL FLANK PAIN: ICD-10-CM

## 2025-06-23 PROCEDURE — 74176 CT ABD & PELVIS W/O CONTRAST: CPT

## 2025-06-24 ENCOUNTER — RESULTS FOLLOW-UP (OUTPATIENT)
Dept: INTERNAL MEDICINE CLINIC | Age: 35
End: 2025-06-24

## 2025-08-13 ENCOUNTER — TELEPHONE (OUTPATIENT)
Dept: CARDIOLOGY CLINIC | Age: 35
End: 2025-08-13

## 2025-08-14 ENCOUNTER — OFFICE VISIT (OUTPATIENT)
Dept: INTERNAL MEDICINE CLINIC | Age: 35
End: 2025-08-14

## 2025-08-14 VITALS
HEIGHT: 62 IN | DIASTOLIC BLOOD PRESSURE: 74 MMHG | WEIGHT: 244 LBS | RESPIRATION RATE: 20 BRPM | OXYGEN SATURATION: 98 % | BODY MASS INDEX: 44.9 KG/M2 | HEART RATE: 70 BPM | SYSTOLIC BLOOD PRESSURE: 110 MMHG

## 2025-08-14 DIAGNOSIS — D50.9 IRON DEFICIENCY ANEMIA, UNSPECIFIED IRON DEFICIENCY ANEMIA TYPE: ICD-10-CM

## 2025-08-14 DIAGNOSIS — E07.9 THYROID DISEASE: ICD-10-CM

## 2025-08-14 DIAGNOSIS — I10 ESSENTIAL HYPERTENSION: Primary | ICD-10-CM

## 2025-08-14 DIAGNOSIS — I26.94 MULTIPLE SUBSEGMENTAL PULMONARY EMBOLI WITHOUT ACUTE COR PULMONALE (HCC): ICD-10-CM

## 2025-08-14 PROCEDURE — 99214 OFFICE O/P EST MOD 30 MIN: CPT | Performed by: NURSE PRACTITIONER

## 2025-08-14 PROCEDURE — 3078F DIAST BP <80 MM HG: CPT | Performed by: NURSE PRACTITIONER

## 2025-08-14 PROCEDURE — 3074F SYST BP LT 130 MM HG: CPT | Performed by: NURSE PRACTITIONER

## 2025-08-14 RX ORDER — LISINOPRIL 20 MG/1
20 TABLET ORAL DAILY
COMMUNITY
Start: 2025-05-20 | End: 2025-08-14 | Stop reason: SDUPTHER

## 2025-08-14 RX ORDER — LISINOPRIL 20 MG/1
20 TABLET ORAL DAILY
Qty: 30 TABLET | Refills: 4 | Status: SHIPPED | OUTPATIENT
Start: 2025-08-14

## 2025-08-14 RX ORDER — LISINOPRIL 20 MG/1
20 TABLET ORAL DAILY
COMMUNITY
Start: 2025-07-17

## 2025-08-14 ASSESSMENT — ENCOUNTER SYMPTOMS
COUGH: 0
VOMITING: 0
SINUS PAIN: 0
APNEA: 0
COLOR CHANGE: 0
NAUSEA: 0
SHORTNESS OF BREATH: 0
DIARRHEA: 0
SINUS PRESSURE: 0
CHEST TIGHTNESS: 0
ABDOMINAL PAIN: 0

## 2025-08-15 DIAGNOSIS — E07.9 THYROID DISEASE: ICD-10-CM

## 2025-08-15 RX ORDER — LEVOTHYROXINE SODIUM 125 UG/1
125 TABLET ORAL DAILY
Qty: 30 TABLET | Refills: 0 | Status: SHIPPED | OUTPATIENT
Start: 2025-08-15

## 2025-08-20 ENCOUNTER — HOSPITAL ENCOUNTER (OUTPATIENT)
Age: 35
Discharge: HOME | End: 2025-08-20

## 2025-08-20 ENCOUNTER — HOSPITAL ENCOUNTER (OUTPATIENT)
Dept: INFUSION THERAPY | Age: 35
Discharge: HOME | End: 2025-08-20

## 2025-08-20 ENCOUNTER — OFFICE VISIT (OUTPATIENT)
Dept: ONCOLOGY | Age: 35
End: 2025-08-20

## 2025-08-20 VITALS
BODY MASS INDEX: 44.16 KG/M2 | TEMPERATURE: 97.8 F | WEIGHT: 240 LBS | SYSTOLIC BLOOD PRESSURE: 112 MMHG | RESPIRATION RATE: 16 BRPM | OXYGEN SATURATION: 99 % | HEIGHT: 62 IN | HEART RATE: 64 BPM | DIASTOLIC BLOOD PRESSURE: 65 MMHG

## 2025-08-20 DIAGNOSIS — I26.94 MULTIPLE SUBSEGMENTAL PULMONARY EMBOLI WITHOUT ACUTE COR PULMONALE (HCC): Primary | ICD-10-CM

## 2025-08-20 DIAGNOSIS — Z13.220 SCREENING FOR LIPID DISORDERS: ICD-10-CM

## 2025-08-20 DIAGNOSIS — D50.9 IRON DEFICIENCY ANEMIA, UNSPECIFIED IRON DEFICIENCY ANEMIA TYPE: ICD-10-CM

## 2025-08-20 DIAGNOSIS — E55.9 VITAMIN D DEFICIENCY: ICD-10-CM

## 2025-08-20 DIAGNOSIS — I10 ESSENTIAL HYPERTENSION: ICD-10-CM

## 2025-08-20 DIAGNOSIS — E07.9 THYROID DISEASE: ICD-10-CM

## 2025-08-20 DIAGNOSIS — D64.9 ANEMIA, UNSPECIFIED TYPE: ICD-10-CM

## 2025-08-20 LAB
25(OH)D3 SERPL-MCNC: 34.5 NG/ML (ref 30–150)
ALBUMIN SERPL-MCNC: 4.2 G/DL (ref 3.4–5)
ALBUMIN/GLOB SERPL: 1.4 {RATIO} (ref 1.1–2.2)
ALP SERPL-CCNC: 85 U/L (ref 40–129)
ALT SERPL-CCNC: 16 U/L (ref 10–40)
ANION GAP SERPL CALCULATED.3IONS-SCNC: 12 MMOL/L (ref 9–17)
AST SERPL-CCNC: 19 U/L (ref 15–37)
BASOPHILS # BLD: 0.04 K/UL
BASOPHILS NFR BLD: 1 % (ref 0–1)
BILIRUB SERPL-MCNC: 0.4 MG/DL (ref 0–1)
BUN SERPL-MCNC: 13 MG/DL (ref 7–20)
CALCIUM SERPL-MCNC: 9.2 MG/DL (ref 8.3–10.6)
CHLORIDE SERPL-SCNC: 101 MMOL/L (ref 99–110)
CHOLEST SERPL-MCNC: 192 MG/DL (ref 125–199)
CO2 SERPL-SCNC: 24 MMOL/L (ref 21–32)
CREAT SERPL-MCNC: 0.8 MG/DL (ref 0.6–1.1)
EOSINOPHIL # BLD: 0.32 K/UL
EOSINOPHILS RELATIVE PERCENT: 5 % (ref 0–3)
ERYTHROCYTE [DISTWIDTH] IN BLOOD BY AUTOMATED COUNT: 12.7 % (ref 11.7–14.9)
GFR, ESTIMATED: 84 ML/MIN/1.73M2
GLUCOSE SERPL-MCNC: 85 MG/DL (ref 74–99)
HCT VFR BLD AUTO: 39.3 % (ref 37–47)
HDLC SERPL-MCNC: 56 MG/DL
HGB BLD-MCNC: 12.8 G/DL (ref 12.5–16)
IMM GRANULOCYTES # BLD AUTO: 0.02 K/UL
IMM GRANULOCYTES NFR BLD: 0 %
LDLC SERPL CALC-MCNC: 125 MG/DL
LYMPHOCYTES NFR BLD: 1.64 K/UL
LYMPHOCYTES RELATIVE PERCENT: 25 % (ref 24–44)
MCH RBC QN AUTO: 30.8 PG (ref 27–31)
MCHC RBC AUTO-ENTMCNC: 32.6 G/DL (ref 32–36)
MCV RBC AUTO: 94.5 FL (ref 78–100)
MONOCYTES NFR BLD: 0.4 K/UL
MONOCYTES NFR BLD: 6 % (ref 0–5)
NEUTROPHILS NFR BLD: 64 % (ref 36–66)
NEUTS SEG NFR BLD: 4.21 K/UL
PLATELET # BLD AUTO: 322 K/UL (ref 140–440)
PMV BLD AUTO: 10 FL (ref 7.5–11.1)
POTASSIUM SERPL-SCNC: 4.3 MMOL/L (ref 3.5–5.1)
PROT SERPL-MCNC: 7.2 G/DL (ref 6.4–8.2)
RBC # BLD AUTO: 4.16 M/UL (ref 4.2–5.4)
SODIUM SERPL-SCNC: 136 MMOL/L (ref 136–145)
T4 FREE SERPL-MCNC: 1.7 NG/DL (ref 0.9–1.8)
TRIGL SERPL-MCNC: 52 MG/DL
TSH SERPL DL<=0.05 MIU/L-ACNC: 2.34 UIU/ML (ref 0.27–4.2)
WBC OTHER # BLD: 6.6 K/UL (ref 4–10.5)

## 2025-08-20 PROCEDURE — 84443 ASSAY THYROID STIM HORMONE: CPT

## 2025-08-20 PROCEDURE — 82306 VITAMIN D 25 HYDROXY: CPT

## 2025-08-20 PROCEDURE — 3074F SYST BP LT 130 MM HG: CPT | Performed by: INTERNAL MEDICINE

## 2025-08-20 PROCEDURE — 99213 OFFICE O/P EST LOW 20 MIN: CPT | Performed by: INTERNAL MEDICINE

## 2025-08-20 PROCEDURE — 84439 ASSAY OF FREE THYROXINE: CPT

## 2025-08-20 PROCEDURE — 3078F DIAST BP <80 MM HG: CPT | Performed by: INTERNAL MEDICINE

## 2025-08-20 PROCEDURE — 80053 COMPREHEN METABOLIC PANEL: CPT

## 2025-08-20 PROCEDURE — 99212 OFFICE O/P EST SF 10 MIN: CPT

## 2025-08-20 PROCEDURE — 80061 LIPID PANEL: CPT

## 2025-08-20 PROCEDURE — 85025 COMPLETE CBC W/AUTO DIFF WBC: CPT

## 2025-08-20 ASSESSMENT — PATIENT HEALTH QUESTIONNAIRE - PHQ9
1. LITTLE INTEREST OR PLEASURE IN DOING THINGS: NOT AT ALL
SUM OF ALL RESPONSES TO PHQ QUESTIONS 1-9: 0
2. FEELING DOWN, DEPRESSED OR HOPELESS: NOT AT ALL
SUM OF ALL RESPONSES TO PHQ QUESTIONS 1-9: 0

## 2025-09-05 RX ORDER — AZITHROMYCIN 250 MG/1
TABLET, FILM COATED ORAL
Qty: 6 TABLET | Refills: 0 | Status: SHIPPED | OUTPATIENT
Start: 2025-09-05 | End: 2025-09-15